# Patient Record
Sex: MALE | Race: WHITE | Employment: OTHER | URBAN - METROPOLITAN AREA
[De-identification: names, ages, dates, MRNs, and addresses within clinical notes are randomized per-mention and may not be internally consistent; named-entity substitution may affect disease eponyms.]

---

## 2018-11-12 ENCOUNTER — HOSPITAL ENCOUNTER (OUTPATIENT)
Dept: MAMMOGRAPHY | Age: 68
Discharge: HOME | End: 2018-11-12
Attending: INTERNAL MEDICINE
Payer: OTHER GOVERNMENT

## 2018-11-12 DIAGNOSIS — N63.0 SWELLING OF BREAST: ICD-10-CM

## 2020-09-01 ENCOUNTER — HOSPITAL ENCOUNTER (EMERGENCY)
Age: 70
Discharge: HOME | End: 2020-09-01
Attending: EMERGENCY MEDICINE
Payer: MEDICARE

## 2020-09-01 ENCOUNTER — APPOINTMENT (OUTPATIENT)
Dept: CT IMAGING | Age: 70
End: 2020-09-01
Attending: EMERGENCY MEDICINE
Payer: MEDICARE

## 2020-09-01 VITALS
RESPIRATION RATE: 16 BRPM | OXYGEN SATURATION: 95 % | SYSTOLIC BLOOD PRESSURE: 126 MMHG | HEIGHT: 67 IN | WEIGHT: 160 LBS | BODY MASS INDEX: 25.11 KG/M2 | DIASTOLIC BLOOD PRESSURE: 81 MMHG | HEART RATE: 69 BPM | TEMPERATURE: 98.6 F

## 2020-09-01 DIAGNOSIS — R10.9 ACUTE ABDOMINAL PAIN: Primary | ICD-10-CM

## 2020-09-01 DIAGNOSIS — K59.00 CONSTIPATION, UNSPECIFIED CONSTIPATION TYPE: ICD-10-CM

## 2020-09-01 LAB
ALBUMIN SERPL-MCNC: 3.6 G/DL (ref 3.2–4.6)
ALBUMIN/GLOB SERPL: 1.2 {RATIO} (ref 1.2–3.5)
ALP SERPL-CCNC: 140 U/L (ref 50–136)
ALT SERPL-CCNC: 22 U/L (ref 12–65)
ANION GAP SERPL CALC-SCNC: 4 MMOL/L (ref 7–16)
AST SERPL-CCNC: 13 U/L (ref 15–37)
BASOPHILS # BLD: 0.1 K/UL (ref 0–0.2)
BASOPHILS NFR BLD: 1 % (ref 0–2)
BILIRUB SERPL-MCNC: 0.2 MG/DL (ref 0.2–1.1)
BUN SERPL-MCNC: 19 MG/DL (ref 8–23)
CALCIUM SERPL-MCNC: 8.8 MG/DL (ref 8.3–10.4)
CHLORIDE SERPL-SCNC: 108 MMOL/L (ref 98–107)
CO2 SERPL-SCNC: 31 MMOL/L (ref 21–32)
CREAT SERPL-MCNC: 1.02 MG/DL (ref 0.8–1.5)
DIFFERENTIAL METHOD BLD: ABNORMAL
EOSINOPHIL # BLD: 0.1 K/UL (ref 0–0.8)
EOSINOPHIL NFR BLD: 2 % (ref 0.5–7.8)
ERYTHROCYTE [DISTWIDTH] IN BLOOD BY AUTOMATED COUNT: 13.5 % (ref 11.9–14.6)
GLOBULIN SER CALC-MCNC: 3.1 G/DL (ref 2.3–3.5)
GLUCOSE SERPL-MCNC: 95 MG/DL (ref 65–100)
HCT VFR BLD AUTO: 40.2 % (ref 41.1–50.3)
HGB BLD-MCNC: 13.4 G/DL (ref 13.6–17.2)
IMM GRANULOCYTES # BLD AUTO: 0 K/UL (ref 0–0.5)
IMM GRANULOCYTES NFR BLD AUTO: 0 % (ref 0–5)
LYMPHOCYTES # BLD: 1.7 K/UL (ref 0.5–4.6)
LYMPHOCYTES NFR BLD: 36 % (ref 13–44)
MCH RBC QN AUTO: 32.4 PG (ref 26.1–32.9)
MCHC RBC AUTO-ENTMCNC: 33.3 G/DL (ref 31.4–35)
MCV RBC AUTO: 97.3 FL (ref 79.6–97.8)
MONOCYTES # BLD: 0.5 K/UL (ref 0.1–1.3)
MONOCYTES NFR BLD: 11 % (ref 4–12)
NEUTS SEG # BLD: 2.4 K/UL (ref 1.7–8.2)
NEUTS SEG NFR BLD: 50 % (ref 43–78)
NRBC # BLD: 0 K/UL (ref 0–0.2)
PLATELET # BLD AUTO: 288 K/UL (ref 150–450)
PMV BLD AUTO: 9.3 FL (ref 9.4–12.3)
POTASSIUM SERPL-SCNC: 4.1 MMOL/L (ref 3.5–5.1)
PROT SERPL-MCNC: 6.7 G/DL (ref 6.3–8.2)
RBC # BLD AUTO: 4.13 M/UL (ref 4.23–5.6)
SODIUM SERPL-SCNC: 143 MMOL/L (ref 136–145)
WBC # BLD AUTO: 4.8 K/UL (ref 4.3–11.1)

## 2020-09-01 PROCEDURE — 85025 COMPLETE CBC W/AUTO DIFF WBC: CPT

## 2020-09-01 PROCEDURE — 74176 CT ABD & PELVIS W/O CONTRAST: CPT

## 2020-09-01 PROCEDURE — 74011250637 HC RX REV CODE- 250/637: Performed by: EMERGENCY MEDICINE

## 2020-09-01 PROCEDURE — 80053 COMPREHEN METABOLIC PANEL: CPT

## 2020-09-01 PROCEDURE — 99283 EMERGENCY DEPT VISIT LOW MDM: CPT

## 2020-09-01 PROCEDURE — 74011250636 HC RX REV CODE- 250/636: Performed by: EMERGENCY MEDICINE

## 2020-09-01 RX ORDER — MAGNESIUM CITRATE
296 SOLUTION, ORAL ORAL
Status: COMPLETED | OUTPATIENT
Start: 2020-09-01 | End: 2020-09-01

## 2020-09-01 RX ADMIN — SODIUM CHLORIDE 1000 ML: 900 INJECTION, SOLUTION INTRAVENOUS at 21:12

## 2020-09-01 RX ADMIN — MAGESIUM CITRATE 296 ML: 1.75 LIQUID ORAL at 22:43

## 2020-09-02 NOTE — DISCHARGE INSTRUCTIONS
Call surgeon for appointment to recheck regarding potentially reoccurring hernia and potentially about your gallstones to see if he feels like they may be causing the pain. Consider use of over-the-counter Colace or MiraLAX. Patient Education        Abdominal Pain: Care Instructions  Your Care Instructions     Abdominal pain has many possible causes. Some aren't serious and get better on their own in a few days. Others need more testing and treatment. If your pain continues or gets worse, you need to be rechecked and may need more tests to find out what is wrong. You may need surgery to correct the problem. Don't ignore new symptoms, such as fever, nausea and vomiting, urination problems, pain that gets worse, and dizziness. These may be signs of a more serious problem. Your doctor may have recommended a follow-up visit in the next 8 to 12 hours. If you are not getting better, you may need more tests or treatment. The doctor has checked you carefully, but problems can develop later. If you notice any problems or new symptoms, get medical treatment right away. Follow-up care is a key part of your treatment and safety. Be sure to make and go to all appointments, and call your doctor if you are having problems. It's also a good idea to know your test results and keep a list of the medicines you take. How can you care for yourself at home? · Rest until you feel better. · To prevent dehydration, drink plenty of fluids, enough so that your urine is light yellow or clear like water. Choose water and other caffeine-free clear liquids until you feel better. If you have kidney, heart, or liver disease and have to limit fluids, talk with your doctor before you increase the amount of fluids you drink. · If your stomach is upset, eat mild foods, such as rice, dry toast or crackers, bananas, and applesauce. Try eating several small meals instead of two or three large ones.   · Wait until 48 hours after all symptoms have gone away before you have spicy foods, alcohol, and drinks that contain caffeine. · Do not eat foods that are high in fat. · Avoid anti-inflammatory medicines such as aspirin, ibuprofen (Advil, Motrin), and naproxen (Aleve). These can cause stomach upset. Talk to your doctor if you take daily aspirin for another health problem. When should you call for help? [id number] anytime you think you may need emergency care. For example, call if:  · You passed out (lost consciousness). · You pass maroon or very bloody stools. · You vomit blood or what looks like coffee grounds. · You have new, severe belly pain. Call your doctor now or seek immediate medical care if:  · Your pain gets worse, especially if it becomes focused in one area of your belly. · You have a new or higher fever. · Your stools are black and look like tar, or they have streaks of blood. · You have unexpected vaginal bleeding. · You have symptoms of a urinary tract infection. These may include:  ? Pain when you urinate. ? Urinating more often than usual.  ? Blood in your urine. · You are dizzy or lightheaded, or you feel like you may faint. Watch closely for changes in your health, and be sure to contact your doctor if:  · You are not getting better after 1 day (24 hours). Where can you learn more? Go to http://joaquim-navjot.info/  Enter H214 in the search box to learn more about \"Abdominal Pain: Care Instructions. \"  Current as of: June 26, 2019               Content Version: 12.5  © 9046-9051 Health[employer]. Care instructions adapted under license by [employer] (which disclaims liability or warranty for this information). If you have questions about a medical condition or this instruction, always ask your healthcare professional. [place] [address] any warranty or liability for your use of this information.          Patient Education        Constipation: Care Instructions  Your Care Instructions     Constipation means that you have a hard time passing stools (bowel movements). People pass stools from 3 times a day to once every 3 days. What is normal for you may be different. Constipation may occur with pain in the rectum and cramping. The pain may get worse when you try to pass stools. Sometimes there are small amounts of bright red blood on toilet paper or the surface of stools. This is because of enlarged veins near the rectum (hemorrhoids). A few changes in your diet and lifestyle may help you avoid ongoing constipation. Your doctor may also prescribe medicine to help loosen your stool. Some medicines can cause constipation. These include pain medicines and antidepressants. Tell your doctor about all the medicines you take. Your doctor may want to make a medicine change to ease your symptoms. Follow-up care is a key part of your treatment and safety. Be sure to make and go to all appointments, and call your doctor if you are having problems. It's also a good idea to know your test results and keep a list of the medicines you take. How can you care for yourself at home? · Drink plenty of fluids, enough so that your urine is light yellow or clear like water. If you have kidney, heart, or liver disease and have to limit fluids, talk with your doctor before you increase the amount of fluids you drink. · Include high-fiber foods in your diet each day. These include fruits, vegetables, beans, and whole grains. · Get at least 30 minutes of exercise on most days of the week. Walking is a good choice. You also may want to do other activities, such as running, swimming, cycling, or playing tennis or team sports. · Take a fiber supplement, such as Citrucel or Metamucil, every day. Read and follow all instructions on the label. · Schedule time each day for a bowel movement. A daily routine may help. Take your time having your bowel movement.   · Support your feet with a small step stool when you sit on the toilet. This helps flex your hips and places your pelvis in a squatting position. · Your doctor may recommend an over-the-counter laxative to relieve your constipation. Examples are Milk of Magnesia and MiraLax. Read and follow all instructions on the label. Do not use laxatives on a long-term basis. When should you call for help? Call your doctor now or seek immediate medical care if:  · You have new or worse belly pain. · You have new or worse nausea or vomiting. · You have blood in your stools. Watch closely for changes in your health, and be sure to contact your doctor if:  · Your constipation is getting worse. · You do not get better as expected. Where can you learn more? Go to http://joaquim-navjot.info/  Enter P343 in the search box to learn more about \"Constipation: Care Instructions. \"  Current as of: June 26, 2019               Content Version: 12.5  © 3652-6049 [employer]. Care instructions adapted under license by [employer] (which disclaims liability or warranty for this information). If you have questions about a medical condition or this instruction, always ask your healthcare professional. [place] [address] any warranty or liability for your use of this information. Patient Education        Learning About Gallstones  What are gallstones? Gallstones are stones that form in the gallbladder. The gallbladder is a small sac located just under the liver. It stores bile released by the liver. Bile helps you digest fats. Gallstones can be smaller than a grain of sand or as large as a golf ball. Gallstones form when cholesterol and other things found in bile make stones. They can also form if the gallbladder doesn't empty as it should. Gallstones can also form in the common bile duct or cystic duct. These tubes carry bile from the gallbladder and the liver to the small intestine.   What happens when you have gallstones? Gallstones can cause many different problems, such as:  · A blockage in the common bile duct. · Inflammation or infection of the gallbladder (acute cholecystitis). This can happen when a gallstone blocks the cystic duct. · Inflammation or infection of the common bile duct (cholangitis). This can happen when gallstones get stuck in the common bile duct. In rare cases, this can damage the liver or spread infection. · Pancreatitis, an inflammation of the pancreas. What are the symptoms? · Most people who have gallstones don't have symptoms. · When symptoms occur, they can include:  ? Pain in the pit of your stomach or in the upper right part of your belly. It may spread to your right upper back or shoulder blade area. ? Pain that may come and go or be steady. It may get worse when you eat. ? Fever and chills, if a gallstone is blocking a bile duct and causing an infection. ? Yellowing of your skin and the whites of your eyes. How can you prevent gallstones? There is no sure way to prevent gallstones. But you can reduce your risk of forming gallstones that can cause symptoms. · Stay at a healthy weight. If you need to lose weight, do so slowly and sensibly. · Eat regular, balanced meals. · Be active, and exercise regularly. How are gallstones treated? · If you don't have symptoms, you probably don't need treatment. · For mild symptoms, your doctor may have you take pain medicine and wait to see if the pain goes away. · For severe pain or infection, or if you have more than one gallstone attack, your doctor may suggest surgery to have your gallbladder removed. The body works fine without a gallbladder. Follow-up care is a key part of your treatment and safety. Be sure to make and go to all appointments, and call your doctor if you are having problems. It's also a good idea to know your test results and keep a list of the medicines you take. Where can you learn more?   Go to http://www.gray.com/  Enter Y514 in the search box to learn more about \"Learning About Gallstones. \"  Current as of: August 12, 2019               Content Version: 12.5  © 8471-6801 Healthwise, Incorporated. Care instructions adapted under license by [employer] (which disclaims liability or warranty for this information). If you have questions about a medical condition or this instruction, always ask your healthcare professional. [place] [address] any warranty or liability for your use of this information.

## 2020-09-02 NOTE — ED PROVIDER NOTES
68-year-old male with 2 to 3-day history of waxing waning lower abdominal pain. Some fullness at times. A hard bowel movement last evening. But no diarrhea or bleeding. No vomiting last few days. States he has occasional vomiting. There is no fever or melena. Patient has a history of umbilical hernias with groin hernias and repairs with mesh. Also history of seizure and hypertension. No issues with his pancreas gallbladder or appendix that he knows of. Review of records reveals hernia surgery with mesh. Also history of exploratory surgery due to chainsaw injury to the abdomen but evidently there was not any intraperitoneal injuries. The history is provided by the patient. Abdominal Pain    This is a new problem. The current episode started more than 2 days ago. The problem occurs daily. The problem has not changed since onset. The pain is located in the RLQ, LLQ, periumbilical region and suprapubic region. The quality of the pain is dull and cramping. The pain is moderate. Associated symptoms include constipation. Pertinent negatives include no fever, no diarrhea, no hematochezia, no melena, no nausea, no vomiting, no dysuria, no frequency, no hematuria, no headaches, no chest pain and no back pain. Nothing worsens the pain. The pain is relieved by nothing. His past medical history does not include pancreatitis or diverticulitis. Past Medical History:   Diagnosis Date    Hypertension     Seizures ([address]        Past Surgical History:   Procedure Laterality Date    ABDOMEN SURGERY PROC UNLISTED      HX ORTHOPAEDIC           No family history on file.     Social History     Socioeconomic History    Marital status:      Spouse name: Not on file    Number of children: Not on file    Years of education: Not on file    Highest education level: Not on file   Occupational History    Not on file   Social Needs    Financial resource strain: Not on file    Food insecurity     Worry: Not on file     Inability: Not on file    Transportation needs     Medical: Not on file     Non-medical: Not on file   Tobacco Use    Smoking status: Not on file    Smokeless tobacco: Never Used   Substance and Sexual Activity    Alcohol use: No    Drug use: No    Sexual activity: Not on file   Lifestyle    Physical activity     Days per week: Not on file     Minutes per session: Not on file    Stress: Not on file   Relationships    Social connections     Talks on phone: Not on file     Gets together: Not on file     Attends Evangelical service: Not on file     Active member of club or organization: Not on file     Attends meetings of clubs or organizations: Not on file     Relationship status: Not on file    Intimate partner violence     Fear of current or ex partner: Not on file     Emotionally abused: Not on file     Physically abused: Not on file     Forced sexual activity: Not on file   Other Topics Concern    Not on file   Social History Narrative    Not on file         ALLERGIES: Aspartame    Review of Systems   Constitutional: Negative for chills and fever. Respiratory: Negative for cough and shortness of breath. Cardiovascular: Negative for chest pain and palpitations. Gastrointestinal: Positive for abdominal pain and constipation. Negative for blood in stool, diarrhea, hematochezia, melena, nausea and vomiting. Genitourinary: Negative for dysuria, flank pain, frequency and hematuria. Musculoskeletal: Negative for back pain and neck pain. Skin: Negative for color change and rash. Neurological: Negative for syncope and headaches. All other systems reviewed and are negative. Vitals:    09/01/20 2016   BP: (!) 178/122   Pulse: 69   Resp: 16   Temp: 98.6 °F (37 °C)   SpO2: 96%   Weight: 72.6 kg (160 lb)   Height: 5' 7\" (1.702 m)            Physical Exam  Vitals signs and nursing note reviewed. Constitutional:       General: He is not in acute distress.      Appearance: He is well-developed. HENT:      Head: Normocephalic and atraumatic. Right Ear: External ear normal.      Left Ear: External ear normal.      Mouth/Throat:      Pharynx: No oropharyngeal exudate. Eyes:      Conjunctiva/sclera: Conjunctivae normal.      Pupils: Pupils are equal, round, and reactive to light. Neck:      Musculoskeletal: Normal range of motion and neck supple. Cardiovascular:      Rate and Rhythm: Normal rate and regular rhythm. Heart sounds: No murmur. Pulmonary:      Effort: No respiratory distress. Breath sounds: Normal breath sounds. Abdominal:      General: Bowel sounds are normal.      Palpations: Abdomen is soft. There is no mass. Tenderness: There is abdominal tenderness in the periumbilical area. There is no guarding or rebound. Negative signs include Rodriguez's sign and McBurney's sign. Hernia: No hernia is present. Comments: Mild pain with palpation. No obvious hernias palpated. Skin:     General: Skin is warm and dry. Neurological:      Mental Status: He is alert and oriented to person, place, and time. Gait: Gait normal.      Comments: Nl speech   Psychiatric:         Speech: Speech normal.          MDM  Number of Diagnoses or Management Options  Diagnosis management comments: Screening blood work. Imaging most likely abdominal series to check for constipation. Probable referral back to surgery regarding potential hernia.        Amount and/or Complexity of Data Reviewed  Clinical lab tests: ordered and reviewed  Tests in the radiology section of CPT®: ordered and reviewed    Risk of Complications, Morbidity, and/or Mortality  Presenting problems: moderate  Diagnostic procedures: minimal  Management options: low    Patient Progress  Patient progress: stable         Procedures      Results Include:    Recent Results (from the past 24 hour(s))   CBC WITH AUTOMATED DIFF    Collection Time: 09/01/20  8:20 PM   Result Value Ref Range    WBC 4.8 4.3 - 11.1 K/uL    RBC 4.13 (L) 4.23 - 5.6 M/uL    HGB 13.4 (L) 13.6 - 17.2 g/dL    HCT 40.2 (L) 41.1 - 50.3 %    MCV 97.3 79.6 - 97.8 FL    MCH 32.4 26.1 - 32.9 PG    MCHC 33.3 31.4 - 35.0 g/dL    RDW 13.5 11.9 - 14.6 %    PLATELET 683 680 - 018 K/uL    MPV 9.3 (L) 9.4 - 12.3 FL    ABSOLUTE NRBC 0.00 0.0 - 0.2 K/uL    DF AUTOMATED      NEUTROPHILS 50 43 - 78 %    LYMPHOCYTES 36 13 - 44 %    MONOCYTES 11 4.0 - 12.0 %    EOSINOPHILS 2 0.5 - 7.8 %    BASOPHILS 1 0.0 - 2.0 %    IMMATURE GRANULOCYTES 0 0.0 - 5.0 %    ABS. NEUTROPHILS 2.4 1.7 - 8.2 K/UL    ABS. LYMPHOCYTES 1.7 0.5 - 4.6 K/UL    ABS. MONOCYTES 0.5 0.1 - 1.3 K/UL    ABS. EOSINOPHILS 0.1 0.0 - 0.8 K/UL    ABS. BASOPHILS 0.1 0.0 - 0.2 K/UL    ABS. IMM. GRANS. 0.0 0.0 - 0.5 K/UL   METABOLIC PANEL, COMPREHENSIVE    Collection Time: 09/01/20  8:20 PM   Result Value Ref Range    Sodium 143 136 - 145 mmol/L    Potassium 4.1 3.5 - 5.1 mmol/L    Chloride 108 (H) 98 - 107 mmol/L    CO2 31 21 - 32 mmol/L    Anion gap 4 (L) 7 - 16 mmol/L    Glucose 95 65 - 100 mg/dL    BUN 19 8 - 23 MG/DL    Creatinine 1.02 0.8 - 1.5 MG/DL    GFR est AA >60 >60 ml/min/1.73m2    GFR est non-AA >60 >60 ml/min/1.73m2    Calcium 8.8 8.3 - 10.4 MG/DL    Bilirubin, total 0.2 0.2 - 1.1 MG/DL    ALT (SGPT) 22 12 - 65 U/L    AST (SGOT) 13 (L) 15 - 37 U/L    Alk. phosphatase 140 (H) 50 - 136 U/L    Protein, total 6.7 6.3 - 8.2 g/dL    Albumin 3.6 3.2 - 4.6 g/dL    Globulin 3.1 2.3 - 3.5 g/dL    A-G Ratio 1.2 1.2 - 3.5       Ct Urogram Wo Cont    Result Date: 9/1/2020  CT abdomen and pelvis without contrast COMPARISON: none. INDICATION: Abdominal pain. . TECHNIQUE: CT imaging through the abdomen and pelvis was performed without intravenous. Radiation dose reduction techniques were used for this study:  Our CT scanners use one or all of the following: Automated exposure control, adjustment of the mA and/or kVp according to patient's size, iterative reconstruction.  FINDINGS: Minimal dependent subsegmental atelectasis at the lung bases. Heart is enlarged. Abdomen findings: Absence of IV contrast limits sensitivity. There is no renal calculus or hydronephrosis. The spleen and the liver are normal in contour. There is suggestion of tiny gallstones. There is no peripancreatic fluid collection. Unenhanced adrenal glands and abdominal aorta are unremarkable. Stomach is normal in contour. Small bowel loops are normal in caliber. No small bowel obstruction. There is no evidence of lymphadenopathy. Pelvic findings: Prostate gland is enlarged, exerting mass effect on the urinary bladder. There are scattered colon diverticula. No diverticulitis. Colon is normal in course and caliber with moderate stool volume. Appendix is within normal limits. There is no free air or free fluid. There are degenerative changes of the spine. ORIF of left femur is partially seen. Sclerotic focus in the left acetabulum, likely a bone island. IMPRESSION: 1. Negative for renal calculus or hydronephrosis. 2. Moderate stool volume in the colon. No evidence of colitis, diverticulitis, appendicitis or bowel obstruction. No hernia. 3. Suggestion of tiny gallstones. 4. Enlarged prostate gland. 5. Absence of IV contrast limits sensitivity. Try patient on laxative. Surgical evaluation to see if any recurrent hernia and possibly for gallstones.

## 2020-09-02 NOTE — ED NOTES
Pt arrives ambulatory to triage complaining of abdominal pain for the \"a couple weeks\" pt believes the pain is caused by a hernia, as he has a hx of hernia which surgically repaired. Pt reports constipation as well as occasionally passing stool with red blood.

## 2020-09-02 NOTE — ED NOTES
I have reviewed discharge instructions with the patient. The patient verbalized understanding. Patient left ED via Discharge Method: ambulatory to Home with self. Opportunity for questions and clarification provided. Patient given 0 scripts. To continue your aftercare when you leave the hospital, you may receive an automated call from our care team to check in on how you are doing. This is a free service and part of our promise to provide the best care and service to meet your aftercare needs.  If you have questions, or wish to unsubscribe from this service please call [phone]. Thank you for Choosing our [address] [place] [address] Emergency Department.

## 2021-10-07 PROBLEM — R10.32 LEFT LOWER QUADRANT ABDOMINAL PAIN: Status: ACTIVE | Noted: 2021-10-07

## 2021-11-16 ENCOUNTER — HOSPITAL ENCOUNTER (EMERGENCY)
Age: 71
Discharge: HOME | End: 2021-11-16
Attending: EMERGENCY MEDICINE
Payer: MEDICARE

## 2021-11-16 ENCOUNTER — APPOINTMENT (OUTPATIENT)
Dept: GENERAL RADIOLOGY | Age: 71
End: 2021-11-16
Attending: EMERGENCY MEDICINE
Payer: MEDICARE

## 2021-11-16 VITALS
OXYGEN SATURATION: 96 % | BODY MASS INDEX: 28.41 KG/M2 | HEIGHT: 67 IN | SYSTOLIC BLOOD PRESSURE: 151 MMHG | HEART RATE: 92 BPM | TEMPERATURE: 98.2 F | RESPIRATION RATE: 16 BRPM | DIASTOLIC BLOOD PRESSURE: 66 MMHG | WEIGHT: 181 LBS

## 2021-11-16 DIAGNOSIS — S81.812A NONINFECTED SKIN TEAR OF LEFT LOWER EXTREMITY, INITIAL ENCOUNTER: Primary | ICD-10-CM

## 2021-11-16 PROCEDURE — 73590 X-RAY EXAM OF LOWER LEG: CPT

## 2021-11-16 PROCEDURE — 99283 EMERGENCY DEPT VISIT LOW MDM: CPT

## 2021-11-16 NOTE — ED PROVIDER NOTES
68-year-old male who presents with chief complaint of laceration to his left lower extremity. This occurred approximately 3 hours prior to arrival while he was using a chainsaw. He is up-to-date with tetanus having last received one 6 years ago had bleeding controlled at home however presented because he was concerned with infection secondary to the motor oil of the chainsaw. No other medical complaints. Past Medical History:   Diagnosis Date    Hypertension     Seizures ([address]        Past Surgical History:   Procedure Laterality Date    HX ORTHOPAEDIC      [place] ABDOMEN SURGERY PROC UNLISTED           No family history on file. Social History     Socioeconomic History    Marital status:      Spouse name: Not on file    Number of children: Not on file    Years of education: Not on file    Highest education level: Not on file   Occupational History    Not on file   Tobacco Use    Smoking status: Never Smoker    Smokeless tobacco: Never Used   Substance and Sexual Activity    Alcohol use: No    Drug use: No    Sexual activity: Not on file   Other Topics Concern    Not on file   Social History Narrative    Not on file     Social Determinants of Health     Financial Resource Strain:     Difficulty of Paying Living Expenses: Not on file   Food Insecurity:     Worried About Running Out of Food in the Last Year: Not on file    [place] of Food in the Last Year: Not on file   Transportation Needs:     Lack of Transportation (Medical): Not on file    Lack of Transportation (Non-Medical):  Not on file   Physical Activity:     Days of Exercise per Week: Not on file    Minutes of Exercise per Session: Not on file   Stress:     Feeling of Stress : Not on file   Social Connections:     Frequency of Communication with Friends and Family: Not on file    Frequency of Social Gatherings with Friends and Family: Not on file    Attends Anglican Services: Not on file   [employer] of Clubs or Organizations: Not on file    Attends Club or Organization Meetings: Not on file    Marital Status: Not on file   Intimate Partner Violence:     Fear of Current or Ex-Partner: Not on file    Emotionally Abused: Not on file    Physically Abused: Not on file    Sexually Abused: Not on file   Housing Stability:     Unable to Pay for Housing in the Last Year: Not on file    Number of [place] in the Last Year: Not on file    Unstable Housing in the Last Year: Not on file         ALLERGIES: Aspartame    Review of Systems   Constitutional: Negative for activity change and fever. HENT: Negative for rhinorrhea. Respiratory: Negative for cough. Cardiovascular: Negative for chest pain. Gastrointestinal: Negative for nausea and vomiting. Genitourinary: Negative for frequency. Skin: Positive for wound. All other systems reviewed and are negative. Vitals:    11/16/21 1604   BP: (!) 151/66   Pulse: 92   Resp: 16   Temp: 98.2 °F (36.8 °C)   SpO2: 96%   Weight: 82.1 kg (181 lb)   Height: 5' 7\" (1.702 m)            Physical Exam  Vitals and nursing note reviewed. Constitutional:       General: He is not in acute distress. Appearance: Normal appearance. He is normal weight. He is not ill-appearing, toxic-appearing or diaphoretic. HENT:      Nose: Nose normal.      Mouth/Throat:      Mouth: Mucous membranes are moist.      Pharynx: No oropharyngeal exudate. Eyes:      Pupils: Pupils are equal, round, and reactive to light. Cardiovascular:      Rate and Rhythm: Normal rate. Pulses: Normal pulses. Heart sounds: No murmur heard. Pulmonary:      Effort: Pulmonary effort is normal.   Abdominal:      General: Abdomen is flat. Palpations: Abdomen is soft. Genitourinary:     Penis: Normal.    Musculoskeletal:         General: Normal range of motion. Comments: Intact dorsi and plantar flexion of the left foot. Patient able to move and articulate the ankle and toes. He has intact sensation distal to the wound. Skin:     Comments: Approximately the 3.5 cm linear laceration with jagged edges to the left lower extremity, medial aspect. Not actively bleeding. This appears more like a skin tear rather than a true laceration. Just through the most superficial layer of dermis. Neurological:      Mental Status: He is alert. MDM  Number of Diagnoses or Management Options  Diagnosis management comments: Wound was cleaned by nursing staff, on physical exam there is no real laceration to repair, this is more of a skin tear. Patient is up-to-date with tetanus. This is a very superficial wound, I see no indication for prophylactic antibiotics at this time. Advise follow-up with his primary care physician.     Risk of Complications, Morbidity, and/or Mortality  Presenting problems: low  Diagnostic procedures: low  Management options: low    Patient Progress  Patient progress: improved         Procedures

## 2021-11-16 NOTE — ED NOTES
I have reviewed discharge instructions with the patient and spouse. The patient and spouse verbalized understanding. Patient left ED via Discharge Method: ambulatory to Home with spouse    Opportunity for questions and clarification provided. Patient given 0 scripts. To continue your aftercare when you leave the hospital, you may receive an automated call from our care team to check in on how you are doing. This is a free service and part of our promise to provide the best care and service to meet your aftercare needs.  If you have questions, or wish to unsubscribe from this service please call [phone]. Thank you for Choosing our [facility] Emergency Department.

## 2021-11-16 NOTE — DISCHARGE INSTRUCTIONS
Keep wound dry for the next 48 hours, afterwards you can change bandage daily. Follow-up with your primary care physician.

## 2022-01-24 PROCEDURE — 99284 EMERGENCY DEPT VISIT MOD MDM: CPT

## 2022-01-25 ENCOUNTER — HOSPITAL ENCOUNTER (OUTPATIENT)
Dept: CT IMAGING | Age: 72
Discharge: HOME | End: 2022-01-25
Attending: EMERGENCY MEDICINE

## 2022-01-25 ENCOUNTER — HOSPITAL ENCOUNTER (EMERGENCY)
Age: 72
Discharge: HOME | End: 2022-01-25
Attending: EMERGENCY MEDICINE
Payer: OTHER GOVERNMENT

## 2022-01-25 VITALS
HEART RATE: 89 BPM | WEIGHT: 179 LBS | OXYGEN SATURATION: 91 % | DIASTOLIC BLOOD PRESSURE: 76 MMHG | SYSTOLIC BLOOD PRESSURE: 142 MMHG | BODY MASS INDEX: 28.04 KG/M2 | TEMPERATURE: 98.2 F | RESPIRATION RATE: 18 BRPM

## 2022-01-25 DIAGNOSIS — S12.112A CLOSED NONDISPLACED ODONTOID FRACTURE WITH TYPE II MORPHOLOGY, INITIAL ENCOUNTER (HCC): Primary | ICD-10-CM

## 2022-01-25 PROCEDURE — 74011250637 HC RX REV CODE- 250/637: Performed by: EMERGENCY MEDICINE

## 2022-01-25 RX ORDER — PROMETHAZINE HYDROCHLORIDE 25 MG/1
25 TABLET ORAL
Qty: 15 TABLET | Refills: 0 | Status: SHIPPED | OUTPATIENT
Start: 2022-01-25

## 2022-01-25 RX ORDER — HYDROCODONE BITARTRATE AND ACETAMINOPHEN 7.5; 325 MG/1; MG/1
1 TABLET ORAL
Status: COMPLETED | OUTPATIENT
Start: 2022-01-25 | End: 2022-01-25

## 2022-01-25 RX ORDER — PROMETHAZINE HYDROCHLORIDE 25 MG/1
25 TABLET ORAL
Status: COMPLETED | OUTPATIENT
Start: 2022-01-25 | End: 2022-01-25

## 2022-01-25 RX ORDER — OXYCODONE HYDROCHLORIDE 5 MG/1
5 TABLET ORAL
Qty: 20 TABLET | Refills: 0 | Status: SHIPPED | OUTPATIENT
Start: 2022-01-25 | End: 2022-01-30

## 2022-01-25 RX ADMIN — HYDROCODONE BITARTRATE AND ACETAMINOPHEN 1 TABLET: 7.5; 325 TABLET ORAL at 06:14

## 2022-01-25 RX ADMIN — PROMETHAZINE HYDROCHLORIDE 25 MG: 25 TABLET ORAL at 06:14

## 2022-01-25 NOTE — ED NOTES
I have reviewed discharge instructions with the patient and spouse. The patient and spouse verbalized understanding. Patient left ED via Discharge Method: ambulatory to Home with transport from wife. Opportunity for questions and clarification provided. Patient given 2 scripts. To continue your aftercare when you leave the hospital, you may receive an automated call from our care team to check in on how you are doing. This is a free service and part of our promise to provide the best care and service to meet your aftercare needs.  If you have questions, or wish to unsubscribe from this service please call [phone]. Thank you for Choosing our [facility] Emergency Department.
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
## 2022-01-25 NOTE — DISCHARGE INSTRUCTIONS
Follow-up with Dr. Quang Sweet of [facility] this week, call the office to make an appointment. Keep your cervical spine collar on at all times, only removing it to change clothes. You should sleep in the collar and wear it during the day. Return to the emergency department if your symptoms worsen.
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
## 2022-03-10 ENCOUNTER — HOSPITAL ENCOUNTER (OUTPATIENT)
Dept: CT IMAGING | Age: 72
Discharge: HOME | End: 2022-03-10
Attending: ORTHOPAEDIC SURGERY
Payer: MEDICARE

## 2022-03-10 DIAGNOSIS — S12.100D CLOSED ODONTOID FRACTURE WITH ROUTINE HEALING, SUBSEQUENT ENCOUNTER: ICD-10-CM

## 2022-03-10 PROCEDURE — 72125 CT NECK SPINE W/O DYE: CPT

## 2022-03-19 PROBLEM — R10.32 LEFT LOWER QUADRANT ABDOMINAL PAIN: Status: ACTIVE | Noted: 2021-10-07

## 2022-04-19 ENCOUNTER — HOSPITAL ENCOUNTER (OUTPATIENT)
Dept: CT IMAGING | Age: 72
Discharge: HOME | End: 2022-04-19
Attending: ORTHOPAEDIC SURGERY
Payer: MEDICARE

## 2022-04-19 DIAGNOSIS — S12.100D CLOSED ODONTOID FRACTURE WITH ROUTINE HEALING, SUBSEQUENT ENCOUNTER: ICD-10-CM

## 2022-04-19 PROCEDURE — 72125 CT NECK SPINE W/O DYE: CPT

## 2022-08-25 NOTE — PROGRESS NOTES
[facility] CARDIOLOGY History & Physical                 Reason for Visit: Severe aortic stenosis    Subjective:     Patient is a 66 y.o. male with a PMH of aortic stenosis, hypertension and anemia who presents as a post ED follow-up. The patient went to the ED at [facility] on August 15. He presented with SOB and CP, according to documentation. Troponin was noted to be normal.  He did test positive for COVID-19. A CXR was notable for \"small bilateral pleural effusions\". He had an echocardiogram on July 31 that was noted to demonstrate a low normal EF with \"possible apical hypokinesis\". Mild AI and \"severe aortic valve stenosis\" was noted. His AV V-max was noted to be 4.25 m/s and AV mean gradient 50 mmHg. Of note, the patient had a hemoglobin of 7.2 three weeks ago. It was 11.2 on August 15. He was admitted at the end of July to [facility] after sustaining a mechanical fall. A CT chest/abdomen/pelvis noted a large left posterior neck/superior scapular hematoma with \"multiple foci of active bleeding\". However, a CTA of the neck noted a 7.2 cm hematoma with no active bleeding. He was also noted to have a scapular hematoma. Cardiology evaluated the patient who noted that the patient to follow-up within 1 month for consideration of TAVR versus SAVR. The patient reports CONTRERAS and near syncope. He denies GI bleeding. Past Medical History:   Diagnosis Date    Hypertension     Seizures ([address]       Past Surgical History:   Procedure Laterality Date    ORTHOPEDIC SURGERY      [place] ABDOMEN SURGERY PROC UNLISTED        No family history on file. Social History     Tobacco Use    Smoking status: Never    Smokeless tobacco: Never   Substance Use Topics    Alcohol use: No      Allergies   Allergen Reactions    Aspartame Other (See Comments)     seizures         ROS:  No obvious pertinent positives on review of systems except for what was outlined above.        Objective:       /80   Pulse 68   Ht 5' 7\" (1.702 m)   Wt 180 lb (81.6 kg)   BMI 28.19 kg/m²     BP Readings from Last 3 Encounters:   08/26/22 138/80   10/07/21 130/70       Wt Readings from Last 3 Encounters:   08/26/22 180 lb (81.6 kg)   10/07/21 188 lb 9.6 oz (85.5 kg)       General/Constitutional:   Alert and oriented x 3, no acute distress  HEENT:   normocephalic, atraumatic, moist mucous membranes  Neck:   No JVD or carotid bruits bilaterally  Cardiovascular:   regular rate and rhythm, no rub/gallop appreciated; 4 out of 6 systolic murmur heard loudest over the RUSB  Pulmonary:   clear to auscultation bilaterally, no respiratory distress  Abdomen:   soft, non-tender, non-distended  Ext:   No sig LE edema bilaterally; varicose veins bilaterally  Skin:  warm and dry, no obvious rashes seen  Neuro:   no obvious sensory or motor deficits  Psychiatric:   normal mood and affect    ECG:   Sinus rhythm  Normal ECG  Heart rate 60 bpm    Data Review:   No results found for: CHOL  No results found for: TRIG  No results found for: HDL  No results found for: LDLCHOLESTEROL, LDLCALC  No results found for: LABVLDL, VLDL  No results found for: [facility]     Lab Results   Component Value Date/Time     09/01/2020 08:20 PM    K 4.1 09/01/2020 08:20 PM     09/01/2020 08:20 PM    CO2 31 09/01/2020 08:20 PM    BUN 19 09/01/2020 08:20 PM    CREATININE 1.02 09/01/2020 08:20 PM    GLUCOSE 95 09/01/2020 08:20 PM    CALCIUM 8.8 09/01/2020 08:20 PM         Lab Results   Component Value Date    ALT 22 09/01/2020    AST 13 (L) 09/01/2020        Assessment/Plan:   1. Near syncope  - Concern that this is secondary to severe AS  - Obtain an echocardiogram on the day of [facility]    2.  CONTRERAS (dyspnea on exertion)  - TTE on July 31 noted a low normal EF with \"possible apical hypokinesis\" and severe aortic stenosis (AV V-max 4.25 m/s and AV mean gradient 50 mmHg)  - Concern that CONTRERAS is an anginal equivalent and/or is related to severe aortic stenosis  - Obtain an echocardiogram on the day of [facility]  - In the setting of severe AS, it is reasonable to pursue an angiogram  - Obtain precath labs and an angiogram pending precath labs      3. Severe aortic stenosis by prior echocardiogram  - Obtain an echocardiogram on the day of LHC  - Obtain an LHC  - Anticipate TAVR or SAVR in the near future    4.  Hypertension, unspecified type  - Well-controlled  - Continue with amlodipine  - Currently on losartan    F/U: [facility]    Deepa Mobley MD

## 2022-08-26 ENCOUNTER — OFFICE VISIT (OUTPATIENT)
Dept: CARDIOLOGY CLINIC | Age: 72
End: 2022-08-26
Payer: MEDICARE

## 2022-08-26 ENCOUNTER — PATIENT MESSAGE (OUTPATIENT)
Dept: CARDIOLOGY CLINIC | Age: 72
End: 2022-08-26

## 2022-08-26 VITALS
HEART RATE: 68 BPM | BODY MASS INDEX: 28.25 KG/M2 | DIASTOLIC BLOOD PRESSURE: 80 MMHG | SYSTOLIC BLOOD PRESSURE: 138 MMHG | WEIGHT: 180 LBS | HEIGHT: 67 IN

## 2022-08-26 DIAGNOSIS — I35.0 SEVERE AORTIC STENOSIS BY PRIOR ECHOCARDIOGRAM: ICD-10-CM

## 2022-08-26 DIAGNOSIS — R06.09 DOE (DYSPNEA ON EXERTION): ICD-10-CM

## 2022-08-26 DIAGNOSIS — R55 NEAR SYNCOPE: Primary | ICD-10-CM

## 2022-08-26 DIAGNOSIS — I10 HYPERTENSION, UNSPECIFIED TYPE: ICD-10-CM

## 2022-08-26 PROBLEM — R06.02 SOB (SHORTNESS OF BREATH): Status: ACTIVE | Noted: 2022-08-26

## 2022-08-26 PROBLEM — I20.0 ACCELERATING ANGINA (HCC): Status: ACTIVE | Noted: 2022-08-26

## 2022-08-26 LAB
ANION GAP SERPL CALC-SCNC: 1 MMOL/L (ref 7–16)
BUN SERPL-MCNC: 14 MG/DL (ref 8–23)
CALCIUM SERPL-MCNC: 8.7 MG/DL (ref 8.3–10.4)
CHLORIDE SERPL-SCNC: 106 MMOL/L (ref 98–107)
CO2 SERPL-SCNC: 28 MMOL/L (ref 21–32)
CREAT SERPL-MCNC: 0.9 MG/DL (ref 0.8–1.5)
ERYTHROCYTE [DISTWIDTH] IN BLOOD BY AUTOMATED COUNT: 14.6 % (ref 11.9–14.6)
GLUCOSE SERPL-MCNC: 88 MG/DL (ref 65–100)
HCT VFR BLD AUTO: 40 % (ref 41.1–50.3)
HGB BLD-MCNC: 12.2 G/DL (ref 13.6–17.2)
MCH RBC QN AUTO: 33.4 PG (ref 26.1–32.9)
MCHC RBC AUTO-ENTMCNC: 30.5 G/DL (ref 31.4–35)
MCV RBC AUTO: 109.6 FL (ref 79.6–97.8)
NRBC # BLD: 0 K/UL (ref 0–0.2)
PLATELET # BLD AUTO: 319 K/UL (ref 150–450)
PMV BLD AUTO: 9.4 FL (ref 9.4–12.3)
POTASSIUM SERPL-SCNC: 4.4 MMOL/L (ref 3.5–5.1)
RBC # BLD AUTO: 3.65 M/UL (ref 4.23–5.6)
SODIUM SERPL-SCNC: 135 MMOL/L (ref 136–145)
WBC # BLD AUTO: 3.9 K/UL (ref 4.3–11.1)

## 2022-08-26 PROCEDURE — 1123F ACP DISCUSS/DSCN MKR DOCD: CPT | Performed by: INTERNAL MEDICINE

## 2022-08-26 PROCEDURE — 93000 ELECTROCARDIOGRAM COMPLETE: CPT | Performed by: INTERNAL MEDICINE

## 2022-08-26 PROCEDURE — 99204 OFFICE O/P NEW MOD 45 MIN: CPT | Performed by: INTERNAL MEDICINE

## 2022-08-26 RX ORDER — DONEPEZIL HYDROCHLORIDE 10 MG/1
5 TABLET, FILM COATED ORAL NIGHTLY
COMMUNITY

## 2022-08-26 RX ORDER — AMLODIPINE BESYLATE 10 MG/1
10 TABLET ORAL DAILY
COMMUNITY

## 2022-08-26 RX ORDER — LOSARTAN POTASSIUM 50 MG/1
50 TABLET ORAL DAILY
COMMUNITY

## 2022-08-26 RX ORDER — VENLAFAXINE HYDROCHLORIDE 150 MG/1
150 CAPSULE, EXTENDED RELEASE ORAL DAILY
COMMUNITY

## 2022-08-26 RX ORDER — OXCARBAZEPINE 300 MG/1
300 TABLET, FILM COATED ORAL 2 TIMES DAILY
COMMUNITY

## 2022-08-26 RX ORDER — FINASTERIDE 5 MG/1
5 TABLET, FILM COATED ORAL DAILY
COMMUNITY

## 2022-08-26 RX ORDER — TAMSULOSIN HYDROCHLORIDE 0.4 MG/1
0.4 CAPSULE ORAL DAILY
COMMUNITY

## 2022-08-29 ENCOUNTER — TELEPHONE (OUTPATIENT)
Dept: CARDIOLOGY CLINIC | Age: 72
End: 2022-08-29

## 2022-08-29 NOTE — TELEPHONE ENCOUNTER
Triage informed pt's wife of Dr. Kayla Monterroso response to pt's labs. She verbalizes understanding.

## 2022-08-29 NOTE — TELEPHONE ENCOUNTER
Did you say you wanted to do an angiogram before the catherization? . My echocardiogram and catherization is scheduled for September 2 at 7:30

## 2022-08-29 NOTE — TELEPHONE ENCOUNTER
Viji Carson MD  You 34 minutes ago (10:37 AM)     ZD  The patient will get a left heart catherization (i.e. coronary angiogram) with Dr. Sveta Friend.         Triage informed pt's wife, Anuel Manzano, of Dr. Tanya Nolan response. She verbalizes understanding.

## 2022-08-29 NOTE — TELEPHONE ENCOUNTER
----- Message from Young Kerns MD sent at 8/27/2022  7:59 AM EDT -----  Please let the patient know his lab work is acceptable for an angiogram.  He does have a mild macrocytic anemia and leukopenia; therefore, I forwarded his lab work to his PCP in case further work up is necessary. His anemia was worse recently in the setting of a hematoma sustained after a fall. His hemoglobin continues to improve since then.

## 2022-09-02 ENCOUNTER — HOSPITAL ENCOUNTER (OUTPATIENT)
Age: 72
Setting detail: OUTPATIENT SURGERY
Discharge: HOME | End: 2022-09-02
Attending: INTERNAL MEDICINE | Admitting: INTERNAL MEDICINE
Payer: OTHER GOVERNMENT

## 2022-09-02 ENCOUNTER — APPOINTMENT (OUTPATIENT)
Dept: NON INVASIVE DIAGNOSTICS | Age: 72
End: 2022-09-02
Attending: INTERNAL MEDICINE
Payer: OTHER GOVERNMENT

## 2022-09-02 VITALS
DIASTOLIC BLOOD PRESSURE: 78 MMHG | OXYGEN SATURATION: 96 % | BODY MASS INDEX: 28.25 KG/M2 | SYSTOLIC BLOOD PRESSURE: 136 MMHG | RESPIRATION RATE: 16 BRPM | WEIGHT: 180 LBS | HEART RATE: 72 BPM | HEIGHT: 67 IN

## 2022-09-02 DIAGNOSIS — R06.02 SOB (SHORTNESS OF BREATH): ICD-10-CM

## 2022-09-02 DIAGNOSIS — I20.0 ACCELERATING ANGINA (HCC): ICD-10-CM

## 2022-09-02 DIAGNOSIS — I35.0 AORTIC VALVE STENOSIS, ETIOLOGY OF CARDIAC VALVE DISEASE UNSPECIFIED: Primary | ICD-10-CM

## 2022-09-02 DIAGNOSIS — R10.32 LEFT LOWER QUADRANT ABDOMINAL PAIN: ICD-10-CM

## 2022-09-02 DIAGNOSIS — R06.09 DOE (DYSPNEA ON EXERTION): ICD-10-CM

## 2022-09-02 DIAGNOSIS — I10 HYPERTENSION: Primary | ICD-10-CM

## 2022-09-02 DIAGNOSIS — S31.109A: ICD-10-CM

## 2022-09-02 DIAGNOSIS — I35.0 SEVERE AORTIC STENOSIS BY PRIOR ECHOCARDIOGRAM: ICD-10-CM

## 2022-09-02 LAB
ECHO AO ASC DIAM: 3.5 CM
ECHO AO ASCENDING AORTA INDEX: 1.81 CM/M2
ECHO AO ROOT DIAM: 3 CM
ECHO AO ROOT INDEX: 1.55 CM/M2
ECHO AR MAX VEL PISA: 4.1 M/S
ECHO AV AREA PEAK VELOCITY: 0.7 CM2
ECHO AV AREA VTI: 0.7 CM2
ECHO AV AREA/BSA PEAK VELOCITY: 0.4 CM2/M2
ECHO AV AREA/BSA VTI: 0.4 CM2/M2
ECHO AV MEAN GRADIENT: 63 MMHG
ECHO AV MEAN VELOCITY: 3.7 M/S
ECHO AV PEAK GRADIENT: 83 MMHG
ECHO AV PEAK VELOCITY: 4.6 M/S
ECHO AV REGURGITANT PHT: 337 MS
ECHO AV VELOCITY RATIO: 0.15
ECHO AV VTI: 122 CM
ECHO BSA: 1.96 M2
ECHO BSA: 1.96 M2
ECHO EST RA PRESSURE: 8 MMHG
ECHO IVC PROX: 2.6 CM
ECHO LA AREA 2C: 24.8 CM2
ECHO LA AREA 4C: 24.4 CM2
ECHO LA DIAMETER INDEX: 2.8 CM/M2
ECHO LA DIAMETER: 5.4 CM
ECHO LA MAJOR AXIS: 6 CM
ECHO LA MINOR AXIS: 5.5 CM
ECHO LA TO AORTIC ROOT RATIO: 1.8
ECHO LA VOL BP: 88 ML (ref 18–58)
ECHO LA VOL/BSA BIPLANE: 46 ML/M2 (ref 16–34)
ECHO LV E' LATERAL VELOCITY: 8 CM/S
ECHO LV E' SEPTAL VELOCITY: 4 CM/S
ECHO LV EDV A2C: 190 ML
ECHO LV EDV A4C: 170 ML
ECHO LV EDV INDEX A4C: 88 ML/M2
ECHO LV EDV NDEX A2C: 98 ML/M2
ECHO LV EJECTION FRACTION A2C: 37 %
ECHO LV EJECTION FRACTION A4C: 36 %
ECHO LV EJECTION FRACTION BIPLANE: 40 % (ref 55–100)
ECHO LV ESV A2C: 119 ML
ECHO LV ESV A4C: 109 ML
ECHO LV ESV INDEX A2C: 62 ML/M2
ECHO LV ESV INDEX A4C: 56 ML/M2
ECHO LV FRACTIONAL SHORTENING: 17 % (ref 28–44)
ECHO LV INTERNAL DIMENSION DIASTOLE INDEX: 3.06 CM/M2
ECHO LV INTERNAL DIMENSION DIASTOLIC: 5.9 CM (ref 4.2–5.9)
ECHO LV INTERNAL DIMENSION SYSTOLIC INDEX: 2.54 CM/M2
ECHO LV INTERNAL DIMENSION SYSTOLIC: 4.9 CM
ECHO LV IVSD: 1.1 CM (ref 0.6–1)
ECHO LV MASS 2D: 271.9 G (ref 88–224)
ECHO LV MASS INDEX 2D: 140.9 G/M2 (ref 49–115)
ECHO LV POSTERIOR WALL DIASTOLIC: 1.1 CM (ref 0.6–1)
ECHO LV RELATIVE WALL THICKNESS RATIO: 0.37
ECHO LVOT AREA: 4.2 CM2
ECHO LVOT AV VTI INDEX: 0.16
ECHO LVOT DIAM: 2.3 CM
ECHO LVOT MEAN GRADIENT: 1 MMHG
ECHO LVOT PEAK GRADIENT: 2 MMHG
ECHO LVOT PEAK VELOCITY: 0.7 M/S
ECHO LVOT STROKE VOLUME INDEX: 40.9 ML/M2
ECHO LVOT SV: 78.9 ML
ECHO LVOT VTI: 19 CM
ECHO MV A VELOCITY: 0.93 M/S
ECHO MV E DECELERATION TIME (DT): 150 MS
ECHO MV E VELOCITY: 1.18 M/S
ECHO MV E/A RATIO: 1.27
ECHO MV E/E' LATERAL: 14.75
ECHO MV E/E' RATIO (AVERAGED): 22.13
ECHO MV E/E' SEPTAL: 29.5
ECHO PV ACCELERATION TIME (AT): 132 MS
ECHO PV MAX VELOCITY: 2 M/S
ECHO PV PEAK GRADIENT: 16 MMHG
ECHO RIGHT VENTRICULAR SYSTOLIC PRESSURE (RVSP): 52 MMHG
ECHO RV BASAL DIMENSION: 4.2 CM
ECHO RV INTERNAL DIMENSION: 3.4 CM
ECHO RV LONGITUDINAL DIMENSION: 6.1 CM
ECHO RV MID DIMENSION: 2.8 CM
ECHO RV TAPSE: 2.2 CM (ref 1.7–?)
ECHO RVOT AREA: 5.7 CM2
ECHO RVOT DIAMETER: 2.7 CM
ECHO TV REGURGITANT MAX VELOCITY: 3.3 M/S
ECHO TV REGURGITANT PEAK GRADIENT: 44 MMHG
EKG ATRIAL RATE: 75 BPM
EKG DIAGNOSIS: NORMAL
EKG P AXIS: 52 DEGREES
EKG P-R INTERVAL: 162 MS
EKG Q-T INTERVAL: 386 MS
EKG QRS DURATION: 120 MS
EKG QTC CALCULATION (BAZETT): 431 MS
EKG R AXIS: -36 DEGREES
EKG T AXIS: 95 DEGREES
EKG VENTRICULAR RATE: 75 BPM

## 2022-09-02 PROCEDURE — C8929 TTE W OR WO FOL WCON,DOPPLER: HCPCS

## 2022-09-02 PROCEDURE — 2500000003 HC RX 250 WO HCPCS: Performed by: INTERNAL MEDICINE

## 2022-09-02 PROCEDURE — 93306 TTE W/DOPPLER COMPLETE: CPT | Performed by: INTERNAL MEDICINE

## 2022-09-02 PROCEDURE — 2709999900 HC NON-CHARGEABLE SUPPLY: Performed by: INTERNAL MEDICINE

## 2022-09-02 PROCEDURE — C1887 CATHETER, GUIDING: HCPCS | Performed by: INTERNAL MEDICINE

## 2022-09-02 PROCEDURE — 6360000002 HC RX W HCPCS: Performed by: INTERNAL MEDICINE

## 2022-09-02 PROCEDURE — 93454 CORONARY ARTERY ANGIO S&I: CPT | Performed by: INTERNAL MEDICINE

## 2022-09-02 PROCEDURE — 2580000003 HC RX 258: Performed by: INTERNAL MEDICINE

## 2022-09-02 PROCEDURE — C1894 INTRO/SHEATH, NON-LASER: HCPCS | Performed by: INTERNAL MEDICINE

## 2022-09-02 PROCEDURE — 6360000004 HC RX CONTRAST MEDICATION: Performed by: INTERNAL MEDICINE

## 2022-09-02 PROCEDURE — 93454 CORONARY ARTERY ANGIO S&I: CPT

## 2022-09-02 PROCEDURE — 93005 ELECTROCARDIOGRAM TRACING: CPT | Performed by: INTERNAL MEDICINE

## 2022-09-02 PROCEDURE — 75630 X-RAY AORTA LEG ARTERIES: CPT | Performed by: INTERNAL MEDICINE

## 2022-09-02 PROCEDURE — C1769 GUIDE WIRE: HCPCS | Performed by: INTERNAL MEDICINE

## 2022-09-02 PROCEDURE — 75630 X-RAY AORTA LEG ARTERIES: CPT

## 2022-09-02 RX ORDER — LIDOCAINE HYDROCHLORIDE 10 MG/ML
INJECTION, SOLUTION INFILTRATION; PERINEURAL PRN
Status: DISCONTINUED | OUTPATIENT
Start: 2022-09-02 | End: 2022-09-02 | Stop reason: HOSPADM

## 2022-09-02 RX ORDER — ASPIRIN 81 MG/1
324 TABLET, CHEWABLE ORAL ONCE
Status: DISCONTINUED | OUTPATIENT
Start: 2022-09-02 | End: 2022-09-02 | Stop reason: HOSPADM

## 2022-09-02 RX ORDER — HEPARIN SODIUM 200 [USP'U]/100ML
INJECTION, SOLUTION INTRAVENOUS CONTINUOUS PRN
Status: DISCONTINUED | OUTPATIENT
Start: 2022-09-02 | End: 2022-09-02 | Stop reason: HOSPADM

## 2022-09-02 RX ORDER — SODIUM CHLORIDE 9 MG/ML
INJECTION, SOLUTION INTRAVENOUS CONTINUOUS
Status: DISCONTINUED | OUTPATIENT
Start: 2022-09-02 | End: 2022-09-02 | Stop reason: HOSPADM

## 2022-09-02 RX ORDER — MIDAZOLAM HYDROCHLORIDE 1 MG/ML
INJECTION INTRAMUSCULAR; INTRAVENOUS PRN
Status: DISCONTINUED | OUTPATIENT
Start: 2022-09-02 | End: 2022-09-02 | Stop reason: HOSPADM

## 2022-09-02 RX ORDER — SODIUM CHLORIDE 0.9 % (FLUSH) 0.9 %
5-40 SYRINGE (ML) INJECTION PRN
Status: DISCONTINUED | OUTPATIENT
Start: 2022-09-02 | End: 2022-09-02 | Stop reason: HOSPADM

## 2022-09-02 RX ORDER — SODIUM CHLORIDE 0.9 % (FLUSH) 0.9 %
5-40 SYRINGE (ML) INJECTION EVERY 12 HOURS SCHEDULED
Status: DISCONTINUED | OUTPATIENT
Start: 2022-09-02 | End: 2022-09-02 | Stop reason: HOSPADM

## 2022-09-02 RX ORDER — ACETAMINOPHEN 325 MG/1
650 TABLET ORAL EVERY 4 HOURS PRN
Status: DISCONTINUED | OUTPATIENT
Start: 2022-09-02 | End: 2022-09-02 | Stop reason: HOSPADM

## 2022-09-02 RX ADMIN — PERFLUTREN 0.45 ML: 6.52 INJECTION, SUSPENSION INTRAVENOUS at 09:14

## 2022-09-02 ASSESSMENT — ENCOUNTER SYMPTOMS
COUGH: 0
BLURRED VISION: 0
BACK PAIN: 0
NAUSEA: 0
SHORTNESS OF BREATH: 1
HEMOPTYSIS: 0
ORTHOPNEA: 0
HEMATEMESIS: 0
CONSTIPATION: 0
RIGHT EYE: 0
HEARTBURN: 0
LEFT EYE: 0
COLOR CHANGE: 0
VOMITING: 0
DIARRHEA: 0
SPUTUM PRODUCTION: 0
ABDOMINAL PAIN: 0
WHEEZING: 0
SORE THROAT: 0

## 2022-09-02 ASSESSMENT — PAIN - FUNCTIONAL ASSESSMENT
PAIN_FUNCTIONAL_ASSESSMENT: NONE - DENIES PAIN

## 2022-09-02 NOTE — CONSULTS
Brandon Joshi. MD Kelly Moreno. [address]         8/26/2022 1950    REFERRING PHYSICIAN:  Dr. Andrea Tran:  The patient is a 66 y.o. male who is seen for evaluation of aortic stenosis. He has noted worsening CONTRERAS, fatigue, occasional chest pain, and dizziness. He states he felt dizzy this morning. Echocardiogram showed critical AS with RADHIKA of 0.7cm2, mean gradient was 63mmHg and peak gradient was 83mmHg. LV EF was normal. He also has moderate MR and moderate TR. He underwent cardiac catheterization today that showed normal coronaries. Past Medical History:   Diagnosis Date    Hypertension     Seizures ([facility]        Past Surgical History:   Procedure Laterality Date    ORTHOPEDIC SURGERY      [place] ABDOMEN SURGERY PROC UNLISTED         No family history on file. Social History     Socioeconomic History    Marital status:      Spouse name: Not on file    Number of children: Not on file    Years of education: Not on file    Highest education level: Not on file   Occupational History    Not on file   Tobacco Use    Smoking status: Never    Smokeless tobacco: Never   Substance and Sexual Activity    Alcohol use: No    Drug use: No    Sexual activity: Not on file   Other Topics Concern    Not on file   Social History Narrative    Not on file     Social Determinants of Health     Financial Resource Strain: Not on file   Food Insecurity: Not on file   Transportation Needs: Not on file   Physical Activity: Not on file   Stress: Not on file   Social Connections: Not on file   Intimate Partner Violence: Not on file   Housing Stability: Not on file       Allergies   Allergen Reactions    Aspartame Other (See Comments)     seizures       No current facility-administered medications on file prior to encounter.      Current Outpatient Medications on File Prior to Encounter   Medication Sig Dispense Refill    venlafaxine [facility] XR) 150 MG extended release capsule Take 150 mg by mouth daily      finasteride (PROSCAR) 5 MG tablet Take 5 mg by mouth daily      donepezil (ARICEPT) 10 MG tablet Take 5 mg by mouth nightly      OXcarbazepine (TRILEPTAL) 300 MG tablet Take 300 mg by mouth 2 times daily      amLODIPine (NORVASC) 10 MG tablet Take 10 mg by mouth daily      losartan (COZAAR) 50 MG tablet Take 50 mg by mouth daily      tamsulosin (FLOMAX) 0.4 MG capsule Take 0.4 mg by mouth daily      Zinc Acetate, Oral, (ZINC ACETATE PO) Take by mouth daily      Multiple Vitamin (MULTI-DAY PO) Take by mouth daily      FOLIC ACID PO Take by mouth daily      Ascorbic Acid (VITAMIN C PO) Take by mouth daily         REVIEW OF SYSTEMS:  Review of Systems   Constitutional: Negative for chills, fever, malaise/fatigue, weight gain and weight loss. HENT:  Negative for ear pain, hearing loss, nosebleeds, sore throat and tinnitus. Eyes:  Negative for blurred vision, vision loss in left eye and vision loss in right eye. Cardiovascular:  Positive for chest pain and dyspnea on exertion. Negative for leg swelling, near-syncope, orthopnea, palpitations, paroxysmal nocturnal dyspnea and syncope. Respiratory:  Positive for shortness of breath. Negative for cough, hemoptysis, sputum production and wheezing. Endocrine: Negative for cold intolerance, heat intolerance and polydipsia. Hematologic/Lymphatic: Negative for bleeding problem. Does not bruise/bleed easily. Skin:  Negative for color change and rash. Musculoskeletal:  Negative for back pain, joint pain, joint swelling and myalgias. Gastrointestinal:  Negative for abdominal pain, constipation, diarrhea, dysphagia, heartburn, hematemesis, melena, nausea and vomiting. Genitourinary:  Negative for dysuria, frequency, hematuria and urgency. Neurological:  Positive for dizziness and light-headedness.  Negative for difficulty with concentration, headaches, numbness, paresthesias, seizures, vertigo and weakness. Psychiatric/Behavioral:  Negative for altered mental status and depression. Physical Exam  Vitals:    09/02/22 0951 09/02/22 1000 09/02/22 1001 09/02/22 1016   BP: 133/76 138/76 133/74 135/71   Pulse: 70 71     Resp: 16 16     SpO2: 91% 91% 95% 97%   Weight:       Height:           Physical Exam:  General: Well Developed, Well Nourished, No Acute Distress  HEENT: Normocephalic, pupils equal and round, no scleral icterus  Neck: supple, no JVD,  Chest wall: No deformity  Heart: S1S2 with RRR with grade IV/VI MYRON Lungs: Clear throughout auscultation bilaterally without adventitious sounds  Abd: soft, nontender, nondistended, with good bowel sounds, no pulsatile masses  Ext: warm, no edema, calves supple/nontender, pulses 2+ bilaterally  Skin: warm and dry, no rashes, cyanosis, jaundice, ecchymoses or evidence of skin breakdown  Psychiatric: Normal mood and affect  Neurologic: Alert and oriented X 3, no focal deficit noted      The patient has NYHA Class III-IV symptoms. In this split/shared patient encounter, I personally reviewed the patient's past medical history, conducted a current medical history and review of systems, and conducted a medically appropriate physical exam. I reviewed the available labs, prior imaging, and current medication list.   The above accounted for 22 minutes of clinical time. Lisa Wells. HARRY Gan    Assessment:     Active Hospital Problems    Severe Aortic stenosis  Mitral regurgitation   Hypertension  Anemia  History of COVID-19      Plan:     SAVR vs TAVR discussed. He prefers TAVR. In this split/shared patient encounter, I personally viewed the cardiac catheterization films and echocardiogram. I reviewed the current and past patient medical history obtained by [facility] HARRY.  I reviewed the available labs and current medication list. I discussed the cardiac catheterization and echocardiogram results and educated the patient/family on treatment options. I ordered additional tests and procedures as indicated and coordinated care for the selected treatment plan, which accounted for 25 minutes of clinical time.

## 2022-09-02 NOTE — Clinical Note
Prepped: right groin and right radial. Site was prepped. Prepped with: ChloraPrep. Patient was draped after wet prep solution dried.

## 2022-09-02 NOTE — PROGRESS NOTES
TRANSFER - IN REPORT:    Verbal [facility] [address] [place] [address] for routine progression of patient care      Report consisted of patients Situation, Background, Assessment and Recommendations(SBAR). Information from the following report(s) Procedure Summary was reviewed with the receiving nurse. Opportunity for questions and clarification was provided. Assessment completed upon patients arrival to unit and care assumed.

## 2022-09-02 NOTE — PROGRESS NOTES
[facility] by   Right radial access  No interventions  Right wrist TR band with 14ml  Versed 2mg IV  Fentanyl 50mcg IV  Access site soft. Clean, dry, and intact; with no signs of bleeding or hematoma  Pt. Tolerated procedure  TRANSFER - OUT REPORT:    Verbal report given to Griselda on Luisito Do  being transferred to [facility] for routine progression of patient care       Report consisted of patient's Situation, Background, Assessment and   Recommendations(SBAR). Information from the following report(s) Nurse Handoff Report and MAR was reviewed with the receiving nurse. Lines:   Peripheral IV 09/02/22 Right Antecubital (Active)        Opportunity for questions and clarification was provided.       Patient transported with:  Registered Nurse

## 2022-09-02 NOTE — DISCHARGE INSTRUCTIONS
HEART CATHETERIZATION/ANGIOGRAPHY DISCHARGE INSTRUCTIONS    Check puncture site frequently for swelling or bleeding. If there is any bleeding, apply pressure over the area with a clean towel or washcloth and call 911. Notify your doctor for any redness, swelling, drainage, or oozing from the puncture site. Notify your doctor for any fever or chills. If the extremity becomes cold, numb, or painful call Dr. Lizbeth Alcazar at [phone]. Activity should be limited for the next 48 hours. No heavy lifting, pushing, pulling  or strenuous activity for 48 hours. No heavy lifting (anything over 10 pounds) for 3 days. You may resume your usual diet. Drink more fluids than usual.  Have a responsible person drive you home and stay with you for at least 24 hours after your heart catheterization/angiography. You may remove bandage from your right arm in 24 hours. You may shower in 24 hours. No tub baths, hot tubs, or swimming for 1 week. Do not place any lotions, creams, powders, or ointments over puncture site for 1 week. You may place a clean band-aid over the puncture site each day for 5 days. Change daily. Sedation for a Medical Procedure: Care Instructions     You were given a sedative medication during your visit. While many of the effects will have worn   off before you leave; you may continue to feel some effects for several hours. Common side effects from sedation include:  Feeling sleepy. (Your doctors and nurses will make sure you are not too sleepy to go home.)  Nausea and vomiting. This usually does not last long. Feeling tired. How can you care for yourself at home? Activity    Don't do anything for 24 hours that requires attention to detail. It takes time for the medicine effects to completely wear off. Do not make important legal decisions for 24 hours. Do not sign any legal documents for 24 hours.      Do not drink alcohol today     For your safety, you should not drive or operate heavy machinery for the remainder of the day     Rest when you feel tired. Getting enough sleep will help you recover. Diet    You can eat your normal diet, unless your doctor gives you other instructions. If your stomach is upset, try clear liquids and bland, low-fat foods like plain toast or rice. Drink plenty of fluids (unless your doctor tells you not to). Don't drink alcohol for 24 hours. Medicines    Be safe with medicines. Read and follow all instructions on the label. If the doctor gave you a prescription medicine for pain, take it as prescribed. If you are not taking a prescription pain medicine, ask your doctor if you can take an over-the-counter medicine. If you think your pain medicine is making you sick to your stomach: Take your medicine after meals (unless your doctor has told you not to). Ask your doctor for a different pain medicine. I have read the above instructions and have had the opportunity to ask questions. Patient: ________________________   Date: _____________    Witness: _______________________   Date: _____________         Transcatheter Aortic Valve Implantation: Before Your Procedure  What is transcatheter aortic valve implantation? Transcatheter aortic valve implantation (HILDA) is a procedure to implant a replacement aortic valve in the heart. It is also called transcatheter aortic valve replacement (TAVR). Your doctor will use a thin, flexible tube called acatheter to put in your new heart valve. Your doctor will put the catheter into a blood vessel in your upper leg (groin) or chest. The doctor moves the catheter through the blood vessel and into yourheart. The replacement valve fits inside the catheter. The valve is made of tissue and metal. Your doctor will move the new valve into your damaged valve. It willexpand and work in place of the old valve. You may be asleep for the procedure, or you may get a sedative to help yourelax.  You won't feel pain when the catheter is put in the blood vessel. You may stay in the hospital for up to a few days. How do you prepare for the procedure? Procedures can be stressful. This information will help you understand what youcan expect. And it will help you safely prepare for your procedure. Preparing for the procedure    You will have several tests to get ready. These may include echocardiograms and a CT scan. Be sure you have someone to take you home. Anesthesia and pain medicine will make it unsafe for you to drive or get home on your own. Understand exactly what procedure is planned, along with the risks, benefits, and other options. If you take aspirin or some other blood thinner, ask your doctor if you should stop taking it before your procedure. Make sure that you understand exactly what your doctor wants you to do. These medicines increase the risk of bleeding. Tell your doctor ALL the medicines, vitamins, supplements, and herbal remedies you take. Some may increase the risk of problems during your procedure. Your doctor will tell you if you should stop taking any of them before the procedure and how soon to do it. Make sure your doctor and the hospital have a copy of your advance directive. If you don't have one, you may want to prepare one. It lets others know your health care wishes. It's a good thing to have before any type of surgery or procedure. What happens on the day of the procedure? Follow the instructions exactly about when to stop eating and drinking. If you don't, your procedure may be canceled. If your doctor told you to take your medicines on the day of the procedure, take them with only a sip of water. Take a bath or shower before you come in for your procedure. Do not apply lotions, perfumes, deodorants, or nail polish. Take off all jewelry and piercings. And take out contact lenses, if you wear them. At the hospital or surgery center   Bring a picture ID. You will be kept comfortable and safe by your anesthesia provider. The anesthesia may make you sleep. Or you may get medicine that relaxes you or puts you in a light sleep. You won't feel pain when the catheter is put in the blood vessel. The procedure may take about 2 to 3 hours. After the procedure, pressure may be applied to the area where the catheter was put in your blood vessel. This will help prevent bleeding. A small device may also be used to close the blood vessel. The area may be covered with a bandage or a compression device. Nurses will check your heart rate and blood pressure. The nurse will also check the catheter site for bleeding. If the catheter was put in your groin, you will need to lie still and keep your leg straight for up to a few hours. The nurse may put a weighted bag on your leg. You may have a bruise or a small lump where the catheter was put in your blood vessel. This is normal and will go away. When should you call your doctor? You have questions or concerns. You don't understand how to prepare for your procedure. You become ill before the procedure (such as fever, flu, or a cold). You need to reschedule or have changed your mind about having the procedure. Where can you learn more? Go to https://[employer]. org and sign in to your [email or web address] account. Enter K979 in the [facility] box to learn more about \"Transcatheter Aortic Valve Implantation: Before Your Procedure. \"     If you do not have an account, please click on the \"Sign Up Now\" link. Current as of: January 10, 2022               Content Version: 13.3  © 2796-1403 Healthwise, Incorporated. Care instructions adapted under license by [facility] ([facility]). If you have questions about a medical condition or this instruction, always ask your healthcare professional. [address] any warranty or liability for your use of this information.

## 2022-09-02 NOTE — PROGRESS NOTES
Patient received to [address] [place] [address] room # 15  Ambulatory from [facility]. Patient scheduled for [facility] today with Dr Sundeep Hanson. Procedure reviewed & questions answered, voiced good understanding consent obtained & placed on chart. All medications and medical history reviewed. Will prep patient per orders. Patient & family updated on plan of care. The patient has a fraility score of 3-MANAGING WELL, based on ambulation without assistance  .      Patient took Aspirin 324mg today at 0545 prior to arrival.

## 2022-09-06 ENCOUNTER — HOSPITAL ENCOUNTER (OUTPATIENT)
Dept: CT IMAGING | Age: 72
Discharge: HOME | End: 2022-09-09
Payer: OTHER GOVERNMENT

## 2022-09-06 ENCOUNTER — HOSPITAL ENCOUNTER (OUTPATIENT)
Dept: PULMONOLOGY | Age: 72
Discharge: HOME | End: 2022-09-09
Payer: OTHER GOVERNMENT

## 2022-09-06 DIAGNOSIS — I35.0 AORTIC VALVE STENOSIS, ETIOLOGY OF CARDIAC VALVE DISEASE UNSPECIFIED: ICD-10-CM

## 2022-09-06 DIAGNOSIS — R06.02 SOB (SHORTNESS OF BREATH): ICD-10-CM

## 2022-09-06 LAB — CREAT BLD-MCNC: 0.8 MG/DL (ref 0.8–1.5)

## 2022-09-06 PROCEDURE — 2580000003 HC RX 258: Performed by: INTERNAL MEDICINE

## 2022-09-06 PROCEDURE — 75574 CT ANGIO HRT W/3D IMAGE: CPT

## 2022-09-06 PROCEDURE — 94010 BREATHING CAPACITY TEST: CPT

## 2022-09-06 PROCEDURE — 94729 DIFFUSING CAPACITY: CPT

## 2022-09-06 PROCEDURE — 94726 PLETHYSMOGRAPHY LUNG VOLUMES: CPT

## 2022-09-06 PROCEDURE — 82565 ASSAY OF CREATININE: CPT

## 2022-09-06 PROCEDURE — 74174 CTA ABD&PLVS W/CONTRAST: CPT

## 2022-09-06 PROCEDURE — 70498 CT ANGIOGRAPHY NECK: CPT

## 2022-09-06 PROCEDURE — 6360000004 HC RX CONTRAST MEDICATION: Performed by: INTERNAL MEDICINE

## 2022-09-06 RX ORDER — 0.9 % SODIUM CHLORIDE 0.9 %
100 INTRAVENOUS SOLUTION INTRAVENOUS
Status: COMPLETED | OUTPATIENT
Start: 2022-09-06 | End: 2022-09-06

## 2022-09-06 RX ORDER — SODIUM CHLORIDE 0.9 % (FLUSH) 0.9 %
10 SYRINGE (ML) INJECTION
Status: DISCONTINUED | OUTPATIENT
Start: 2022-09-06 | End: 2022-09-10 | Stop reason: HOSPADM

## 2022-09-06 RX ADMIN — SODIUM CHLORIDE 100 ML: 9 INJECTION, SOLUTION INTRAVENOUS at 09:34

## 2022-09-06 RX ADMIN — IOPAMIDOL 75 ML: 755 INJECTION, SOLUTION INTRAVENOUS at 09:34

## 2022-09-06 ASSESSMENT — KANSAS CITY CARDIOMYOPATHY QUESTIONNAIRE (KCCQ12)
3. OVER THE PAST 2 WEEKS, ON AVERAGE, HOW MANY TIMES HAS FATIGUE LIMITED YOUR ABILITY TO DO WHAT YOU WANTED: 2
8A. OVER THE PAST 2 WEEKS, ON AVERAGE, HOW HAS HEART FAILURE LIMITED YOU ABILITY TO DO HOBBIES OR RECREATIONAL ACTIVITIES: 1
6. OVER THE PAST 2 WEEKS, HOW MUCH HAS YOUR HEART FAILURE LIMITED YOUR ENJOYMENT OF LIFE: 2
7. IF YOU HAD TO SPEND THE REST OF YOUR LIFE WITH YOUR HEART FAILURE THE WAY IT IS RIGHT NOW, HOW WOULD YOU FEEL ABOUT THIS?: 2
1B. OVER THE PAST 2 WEEKS, HOW MUCH WERE YOU LIMITED BY HEART FAILURE SYMPTOMS (SHORTNESS OF BREATH OR FATIGUE) WHEN WALKING 1 BLOCK ON LEVEL GROUND: 4
4. OVER THE PAST 2 WEEKS, ON AVERAGE, HOW MANY TIMES HAS SHORTNESS OF BREATH LIMITED YOUR ABILITY TO DO WHAT YOU WANTED: 2
1A. OVER THE PAST 2 WEEKS, HOW MUCH WERE YOU LIMITED BY HEART FAILURE SYMPTOMS (SHORTNESS OF BREATH OR FATIGUE) WHEN SHOWERING OR BATHING: 5
1C. OVER THE PAST 2 WEEKS, HOW MUCH WERE YOU LIMITED BY HEART FAILURE SYMPTOMS (SHORTNESS OF BREATH OR FATIGUE) WHEN HURRYING OR JOGGING (AS IF TO CATCH A BUS): 2
5. OVER THE PAST 2 WEEKS, ON AVERAGE, HOW MANY TIMES HAVE YOU BEEN FORCED TO SLEEP SITTING UP IN A CHAIR OR WITH AT LEAST 3 PILLOWS TO PROP YOU UP BECAUSE OF SHORTNESS OF BREATH?: 5
8C. OVER THE PAST 2 WEEKS, ON AVERAGE, HOW HAS HEART FAILURE LIMITED YOU ABILITY TO VISIT FAMILY AND FRIENDS OUR OF YOUR HOME: 1
2. OVER THE PAST 2 WEEKS, HOW MANY TIMES DID YOU HAVE SWELLING IN YOUR FEET, ANKLES OR LEGS WHEN YOU WOKE UP IN THE MORNING: 1
8B. OVER THE PAST 2 WEEKS, ON AVERAGE, HOW HAS HEART FAILURE LIMITED YOU ABILITY TO WORK OR DO HOUSEHOLD CHORES: 1

## 2022-09-06 NOTE — NURSE NAVIGATOR
Educational information/pamphlet provided to the pt regarding aortic stenosis and treatment options (SAVR and TAVR). Provided pt the CardioSmart tool for review as the shared decision making process continues between pt and valve team physicians. Also explained that the CT will evaluate the pt for a potential TAVR/SAVR. Contact information provided for any further questions.     Idalia Lobato, Structural Heart Navigator

## 2022-09-09 PROCEDURE — 75574 CT ANGIO HRT W/3D IMAGE: CPT | Performed by: INTERNAL MEDICINE

## 2022-09-19 ENCOUNTER — TELEPHONE (OUTPATIENT)
Dept: CASE MANAGEMENT | Age: 72
End: 2022-09-19

## 2022-09-19 ENCOUNTER — OFFICE VISIT (OUTPATIENT)
Dept: CARDIOLOGY CLINIC | Age: 72
End: 2022-09-19
Payer: OTHER GOVERNMENT

## 2022-09-19 VITALS
HEART RATE: 86 BPM | SYSTOLIC BLOOD PRESSURE: 134 MMHG | WEIGHT: 182 LBS | DIASTOLIC BLOOD PRESSURE: 72 MMHG | HEIGHT: 67 IN | BODY MASS INDEX: 28.56 KG/M2 | OXYGEN SATURATION: 98 %

## 2022-09-19 DIAGNOSIS — I35.0 SEVERE AORTIC STENOSIS BY PRIOR ECHOCARDIOGRAM: Primary | ICD-10-CM

## 2022-09-19 DIAGNOSIS — I10 HYPERTENSION, UNSPECIFIED TYPE: ICD-10-CM

## 2022-09-19 PROCEDURE — 1123F ACP DISCUSS/DSCN MKR DOCD: CPT | Performed by: INTERNAL MEDICINE

## 2022-09-19 PROCEDURE — 99215 OFFICE O/P EST HI 40 MIN: CPT | Performed by: INTERNAL MEDICINE

## 2022-09-19 RX ORDER — NAPROXEN 375 MG/1
375 TABLET ORAL 2 TIMES DAILY WITH MEALS
Status: ON HOLD | COMMUNITY
End: 2022-09-28 | Stop reason: HOSPADM

## 2022-09-19 ASSESSMENT — ENCOUNTER SYMPTOMS
COUGH: 0
BACK PAIN: 0
SHORTNESS OF BREATH: 1
ABDOMINAL PAIN: 0

## 2022-09-19 NOTE — TELEPHONE ENCOUNTER
Preadmission Testing for TAVR  When: September 26  Time: Arrive no later than 7:30 am  Where: ([facility]) [facility] [place] [address]  Instructions:          Eat breakfast before arrival on       Take your morning medications before you arrive. Do not take any vitamins or supplements. Bring your medications in the bottles or a list with the dosages so we can verify them      Bring someone with you to the visit if available      Bring your COVID vaccine card, insurance cards, 's license, [facility] of  or Living Will      Plan for a 2hour visit  At this appointment you will talk to anesthesia, and they will tell you the anesthesia plan for your surgery, you will have non fasting labs drawn, chest X-ray completed, EKG, and a breathing test (pulmonary function test-if not completed at the CT appt) completed. Transcatheter Aortic Valve Replacement (TAVR)   When: September 27  Arrival time: Someone from pre will call you on Monday, the day prior to your procedure, around lunch to let you know exactly what time to be at the hospital on Tuesday. Where: [address]   On Tuesday, you will come to the hospital ([address] at the front entrance (the [place] of [place] [address]), check in at registration on the left just inside the doors, and they will direct you where to go. Plan to stay in the hospital at least 1 night, maybe 2.      Call Berlin Welch for any questions [phone]

## 2022-09-19 NOTE — PROGRESS NOTES
[facility] CARDIOLOGY  [address] [place]  [address]      22      NAME:  Go De La Cruz  : 1950  MRN: [id number]      SUBJECTIVE:   Go De La Cruz is a 66 y.o. male seen for a follow up visit regarding the following:     Chief Complaint   Patient presents with    Hypertension       HPI:   66 y.o. male with critical aortic stenosis. He has type I bicuspid valve. He has completed TAVR work-up. He appears appropriate for both SAVR and TAVR. Patient has chosen to pursue TAVR. LVEF is normal.  Patient has normal coronaries and large iliacs bilaterally. He reports moderate dyspnea on exertion and some wheezing when lying flat which is been progressive over the last 2 to 3 months. Past Medical History, Past Surgical History, Family history, Social History, and Medications were all reviewed with the patient today and updated as necessary. Current Outpatient Medications   Medication Sig Dispense Refill    naproxen (NAPROSYN) 375 MG tablet Take 375 mg by mouth 2 times daily (with meals)      venlafaxine (EFFEXOR XR) 150 MG extended release capsule Take 150 mg by mouth daily      finasteride (PROSCAR) 5 MG tablet Take 5 mg by mouth daily      donepezil (ARICEPT) 10 MG tablet Take 5 mg by mouth nightly      OXcarbazepine (TRILEPTAL) 300 MG tablet Take 300 mg by mouth 2 times daily      amLODIPine (NORVASC) 10 MG tablet Take 10 mg by mouth daily      losartan (COZAAR) 50 MG tablet Take 50 mg by mouth daily      tamsulosin (FLOMAX) 0.4 MG capsule Take 0.4 mg by mouth daily      Zinc Acetate, Oral, (ZINC ACETATE PO) Take by mouth daily      Multiple Vitamin (MULTI-DAY PO) Take by mouth daily      FOLIC ACID PO Take by mouth daily      Ascorbic Acid (VITAMIN C PO) Take by mouth daily       No current facility-administered medications for this visit.      Patient Active Problem List    Diagnosis Date Noted    CONTRERAS (dyspnea on exertion) 2022     Priority: Medium discontinued medications. Patient has been instructed and agrees to call our office with any issues or other concerns related to their cardiac condition(s) and/or complaint(s). Return for TAVR TC-7.        Emmanuel Thompson MD  9/19/2022

## 2022-09-26 ENCOUNTER — ANESTHESIA EVENT (OUTPATIENT)
Dept: SURGERY | Age: 72
DRG: 267 | End: 2022-09-26
Payer: OTHER GOVERNMENT

## 2022-09-26 ENCOUNTER — HOSPITAL ENCOUNTER (OUTPATIENT)
Dept: PREADMISSION TESTING | Age: 72
Discharge: HOME | DRG: 267 | End: 2022-09-29
Payer: OTHER GOVERNMENT

## 2022-09-26 ENCOUNTER — HOSPITAL ENCOUNTER (OUTPATIENT)
Dept: GENERAL RADIOLOGY | Age: 72
Discharge: HOME | End: 2022-09-29

## 2022-09-26 VITALS
TEMPERATURE: 98.4 F | SYSTOLIC BLOOD PRESSURE: 132 MMHG | DIASTOLIC BLOOD PRESSURE: 77 MMHG | BODY MASS INDEX: 28.24 KG/M2 | WEIGHT: 179.9 LBS | RESPIRATION RATE: 16 BRPM | HEIGHT: 67 IN | OXYGEN SATURATION: 95 %

## 2022-09-26 LAB
ALBUMIN SERPL-MCNC: 3.9 G/DL (ref 3.2–4.6)
ALBUMIN/GLOB SERPL: 1.4 {RATIO} (ref 1.2–3.5)
ALP SERPL-CCNC: 110 U/L (ref 50–136)
ALT SERPL-CCNC: 22 U/L (ref 12–65)
ANION GAP SERPL CALC-SCNC: 6 MMOL/L (ref 4–13)
APPEARANCE UR: CLEAR
AST SERPL-CCNC: 14 U/L (ref 15–37)
BACTERIA SPEC CULT: NORMAL
BILIRUB SERPL-MCNC: 0.4 MG/DL (ref 0.2–1.1)
BILIRUB UR QL: NEGATIVE
BUN SERPL-MCNC: 13 MG/DL (ref 8–23)
CALCIUM SERPL-MCNC: 8.6 MG/DL (ref 8.3–10.4)
CHLORIDE SERPL-SCNC: 100 MMOL/L (ref 101–110)
CO2 SERPL-SCNC: 27 MMOL/L (ref 21–32)
COLOR UR: YELLOW
CREAT SERPL-MCNC: 0.74 MG/DL (ref 0.8–1.5)
EKG ATRIAL RATE: 90 BPM
EKG DIAGNOSIS: NORMAL
EKG P AXIS: 51 DEGREES
EKG P-R INTERVAL: 148 MS
EKG Q-T INTERVAL: 366 MS
EKG QRS DURATION: 118 MS
EKG QTC CALCULATION (BAZETT): 447 MS
EKG R AXIS: -43 DEGREES
EKG T AXIS: 109 DEGREES
EKG VENTRICULAR RATE: 90 BPM
ERYTHROCYTE [DISTWIDTH] IN BLOOD BY AUTOMATED COUNT: 12.5 % (ref 11.9–14.6)
EST. AVERAGE GLUCOSE BLD GHB EST-MCNC: 100 MG/DL
GLOBULIN SER CALC-MCNC: 2.7 G/DL (ref 2.3–3.5)
GLUCOSE SERPL-MCNC: 106 MG/DL (ref 65–100)
GLUCOSE UR STRIP.AUTO-MCNC: NEGATIVE MG/DL
HBA1C MFR BLD: 5.1 % (ref 4.8–5.6)
HCT VFR BLD AUTO: 39.9 % (ref 41.1–50.3)
HGB BLD-MCNC: 13.5 G/DL (ref 13.6–17.2)
HGB UR QL STRIP: NEGATIVE
HISTORY CHECK: NORMAL
INR PPP: 1.1
KETONES UR QL STRIP.AUTO: NEGATIVE MG/DL
LEUKOCYTE ESTERASE UR QL STRIP.AUTO: NEGATIVE
MAGNESIUM SERPL-MCNC: 2.2 MG/DL (ref 1.8–2.4)
MCH RBC QN AUTO: 32.6 PG (ref 26.1–32.9)
MCHC RBC AUTO-ENTMCNC: 33.8 G/DL (ref 31.4–35)
MCV RBC AUTO: 96.4 FL (ref 79.6–97.8)
NITRITE UR QL STRIP.AUTO: NEGATIVE
NRBC # BLD: 0 K/UL (ref 0–0.2)
PH UR STRIP: 6.5 [PH] (ref 5–9)
PLATELET # BLD AUTO: 310 K/UL (ref 150–450)
PMV BLD AUTO: 9.3 FL (ref 9.4–12.3)
POTASSIUM SERPL-SCNC: 4.1 MMOL/L (ref 3.5–5.1)
PROT SERPL-MCNC: 6.6 G/DL (ref 6.3–8.2)
PROT UR STRIP-MCNC: NEGATIVE MG/DL
PROTHROMBIN TIME: 15 SEC (ref 12.6–14.5)
RBC # BLD AUTO: 4.14 M/UL (ref 4.23–5.6)
SERVICE CMNT-IMP: NORMAL
SODIUM SERPL-SCNC: 133 MMOL/L (ref 136–145)
SP GR UR REFRACTOMETRY: 1.02 (ref 1–1.02)
UROBILINOGEN UR QL STRIP.AUTO: 0.2 EU/DL (ref 0.2–1)
WBC # BLD AUTO: 5.6 K/UL (ref 4.3–11.1)

## 2022-09-26 PROCEDURE — 85027 COMPLETE CBC AUTOMATED: CPT

## 2022-09-26 PROCEDURE — 87086 URINE CULTURE/COLONY COUNT: CPT

## 2022-09-26 PROCEDURE — 71046 X-RAY EXAM CHEST 2 VIEWS: CPT

## 2022-09-26 PROCEDURE — 87641 MR-STAPH DNA AMP PROBE: CPT

## 2022-09-26 PROCEDURE — 85610 PROTHROMBIN TIME: CPT

## 2022-09-26 PROCEDURE — 81003 URINALYSIS AUTO W/O SCOPE: CPT

## 2022-09-26 PROCEDURE — 93005 ELECTROCARDIOGRAM TRACING: CPT | Performed by: PHYSICIAN ASSISTANT

## 2022-09-26 PROCEDURE — 86920 COMPATIBILITY TEST SPIN: CPT

## 2022-09-26 PROCEDURE — 83735 ASSAY OF MAGNESIUM: CPT

## 2022-09-26 PROCEDURE — 86850 RBC ANTIBODY SCREEN: CPT

## 2022-09-26 PROCEDURE — 80053 COMPREHEN METABOLIC PANEL: CPT

## 2022-09-26 PROCEDURE — 83036 HEMOGLOBIN GLYCOSYLATED A1C: CPT

## 2022-09-26 ASSESSMENT — PAIN DESCRIPTION - ORIENTATION: ORIENTATION: RIGHT

## 2022-09-26 NOTE — PERIOP NOTE
Labs reviewed and routed to surgeon. PT of 15 called to Dr Christy Adams per anesthesia protocol. No orders received.    Latest Reference Range & Units 9/26/22 08:27   Sodium 136 - 145 mmol/L 133 (L)   Potassium 3.5 - 5.1 mmol/L 4.1   Chloride 101 - 110 mmol/L 100 (L)   CO2 21 - 32 mmol/L 27   BUN,BUNPL 8 - 23 MG/DL 13   Creatinine 0.8 - 1.5 MG/DL 0.74 (L)   Anion Gap 4 - 13 mmol/L 6   GFR Non-African American >60 ml/min/1.73m2 >60   GFR  >60 ml/min/1.73m2 >60   Magnesium 1.8 - 2.4 mg/dL 2.2   Glucose, Random 65 - 100 mg/dL 106 (H)   CALCIUM, SERUM, [id number] 8.3 - 10.4 MG/DL 8.6   ALBUMIN/GLOBULIN RATIO 1.2 - 3.5   1.4   Total Protein 6.3 - 8.2 g/dL 6.6   Albumin 3.2 - 4.6 g/dL 3.9   Globulin 2.3 - 3.5 g/dL 2.7   Alk Phosphatase 50 - 136 U/L 110   ALT 12 - 65 U/L 22   AST 15 - 37 U/L 14 (L)   Bilirubin 0.2 - 1.1 MG/DL 0.4   Hemoglobin A1C 4.8 - 5.6 % 5.1   eAG (mg/dL) mg/dL 100   WBC 4.3 - 11.1 K/uL 5.6   RBC 4.23 - 5.6 M/uL 4.14 (L)   Hemoglobin Quant 13.6 - 17.2 g/dL 13.5 (L)   Hematocrit 41.1 - 50.3 % 39.9 (L)   MCV 79.6 - 97.8 FL 96.4   MCH 26.1 - 32.9 PG 32.6   MCHC 31.4 - 35.0 g/dL 33.8   MPV 9.4 - 12.3 FL 9.3 (L)   RDW 11.9 - 14.6 % 12.5   Platelet Count 796 - 450 K/uL 310   Nucleated Red Blood Cells 0.0 - 0.2 K/uL 0.00   Prothrombin Time 12.6 - 14.5 sec 15.0 (H)   INR -   1.1   MSSA/MRSA SCREEN BY PCR  Rpt   (L): Data is abnormally low  (H): Data is abnormally high  Rpt: View report in Results Review for more information

## 2022-09-26 NOTE — PERIOP NOTE
UA reviewed   Latest Reference Range & Units 9/26/22 08:55   CULTURE, URINE  Rpt   Color, UA -   YELLOW   Glucose, UA NEG mg/dL Negative   Bilirubin, Urine NEG   Negative   Ketones, Urine NEG mg/dL Negative   Specific Gravity, UA 1.001 - 1.023   1.020   Blood, Urine NEG   Negative   Protein, UA NEG mg/dL Negative   Urobilinogen, Urine 0.2 - 1.0 EU/dL 0.2   Nitrite, Urine NEG   Negative   Leukocyte Esterase, Urine NEG   Negative   Appearance -   CLEAR   pH, Urine 5.0 - 9.0   6.5   Rpt: View report in Results Review for more information

## 2022-09-26 NOTE — PERIOP NOTE
Directly informed patient and or family member of pre op arrival time 5 on 9/27. All questions answered. Pre op instructions reviewed. Left contact information for any additional questions or needs.

## 2022-09-26 NOTE — PERIOP NOTE
Patient confirms name and . Order to obtain consent  found in EHR and  matches case posting. Patient verified. Labs/Orders per Surgeon: T&S,T&C, CBC, CMP, UA, Urine cult, EKG, A1C, Mg MRSA/MSSA, PT/INR, completed. CXR after PAT visit. PT=15 called to Dr Broderick Cesar per protocol. Labs routed to surgeon. POC Glucose: NA    EKG completed. Pt reported frequent \"spells\" of dizziness and diaphoretic. Pt denied CP. Pt stated they have been the same these past several weeks but wife states they are increasing. Pt had an episode while obtaining EKG (diaphoretic and \"hot\" but denied CP). Per anesthesia protocol called EKG result along with above symptoms to Dr Ladonna Dugan. No orders rec'd. This nurse reminded patient and his wife to go to ER/call 911 (prior to surgery tomorrow) if symptoms increased or if he had CP. Reminded them to go to [facility]. Patient and wife voiced understanding    MRSA swab, and clean catch urine obtained and sent to lab. Patient ID band applied, and patient taken to radiology with orders in hand for CXR     Labs drawn  including  blood bank labs. Blue blood bank wrist band placed on the patient's right wrist and pt verbalizes understanding not to remove the band until discharged from the hospital after surgery. Medication list updated today. Medication bottles visualized. Pt answered medical and surgical history questions to the best of their ability. Pre op instructions and education sheets given and reviewed with patient:  Heart instructions, blood transfusion education, hand hygiene education, smoking cessation education if applicable, pain management education. I/S instruction given and pt return demonstrated. Patient verbalizes understanding of all instructions. Pt instructed on importance of handwashing for infection prevention.  Pt verbalizes understanding to shower nightly with antibacterial soap & antiseptic wash provided at pre assessment on the night prior to and morning of surgery. Instructed to turn water off and wash with HIBICLENS/ Antiseptic Wash from chin to toes avoiding genitalia, then rinse after 1 minute. Pt verbalizes understanding to repeat this the morning of surgery. Patient verbalizes understanding that arrival time will be called to patient on the weekday prior to surgery date and that patient must check phone messages. If patient has any questions regarding arrival times call [phone]. You will received a call from the pre-op nurse by 5 pm on the business day prior to the scheduled procedure. If you have not spoken with a nurse, please check your voicemail. If you have not received an arrival time by 5 pm, please call [phone]. You may be required to pay a deductible or co-pay on the day of your procedure. You can pre-pay by calling  [phone] if your surgery is at the [facility].

## 2022-09-26 NOTE — PERIOP NOTE
PLEASE CONTINUE TAKING ALL PRESCRIPTION MEDICATIONS UP TO THE DAY OF SURGERY UNLESS OTHERWISE DIRECTED BELOW. Take ONLY the following medications on the day of surgery:   Effexor  Trileptal (oxcarbazepine)            Hold the following medications as specified:   Do not take Aricept (donepezil) tonight-9/26/22 or on day of surgery-9/27/22     DISCONTINUE all vitamins and supplements 7 days prior to surgery. DISCONTINUE Non-Steriodal Anti-Inflammatory (NSAIDS) such as Advil and Aleve 5 days prior to surgery. Comments       On the day before surgery please take Tylenol (Acetaminophen) 1000mg in the morning and then again before bed OR you may substitute for Tylenol 650 mg morning, noon, and evening. Please do not bring home medications with you on the day of surgery unless otherwise directed by your nurse. If you are instructed to bring home medications, please give them to your nurse as they will be administered by the nursing staff. If you have any questions, please call Allison Guevara [phone] or  [facility] [phone]. A copy of this note was provided to the patient for reference.

## 2022-09-27 ENCOUNTER — ANESTHESIA (OUTPATIENT)
Dept: SURGERY | Age: 72
DRG: 267 | End: 2022-09-27
Payer: OTHER GOVERNMENT

## 2022-09-27 ENCOUNTER — HOSPITAL ENCOUNTER (INPATIENT)
Age: 72
LOS: 1 days | Discharge: HOME | DRG: 267 | End: 2022-09-28
Attending: INTERNAL MEDICINE | Admitting: INTERNAL MEDICINE
Payer: OTHER GOVERNMENT

## 2022-09-27 ENCOUNTER — APPOINTMENT (OUTPATIENT)
Dept: NON INVASIVE DIAGNOSTICS | Age: 72
DRG: 267 | End: 2022-09-27
Attending: INTERNAL MEDICINE
Payer: OTHER GOVERNMENT

## 2022-09-27 DIAGNOSIS — Z95.2 S/P TAVR (TRANSCATHETER AORTIC VALVE REPLACEMENT): ICD-10-CM

## 2022-09-27 DIAGNOSIS — I35.0 AORTIC VALVE STENOSIS, ETIOLOGY OF CARDIAC VALVE DISEASE UNSPECIFIED: ICD-10-CM

## 2022-09-27 DIAGNOSIS — I35.0 AORTIC STENOSIS: ICD-10-CM

## 2022-09-27 DIAGNOSIS — I35.0 AORTIC STENOSIS, SEVERE: Primary | ICD-10-CM

## 2022-09-27 LAB
ACT AVERAGE - AAVG: 302 SEC
BASE DEFICIT BLD-SCNC: 0.5 MMOL/L
BASE DEFICIT BLD-SCNC: 1.8 MMOL/L
BASELINE ACT: 130 SEC
CA-I BLD-MCNC: 1.22 MMOL/L (ref 1.12–1.32)
CA-I BLD-MCNC: 1.23 MMOL/L (ref 1.12–1.32)
CO2 BLD-SCNC: 25 MMOL/L (ref 13–23)
CO2 BLD-SCNC: 25 MMOL/L (ref 13–23)
ECHO AR MAX VEL PISA: 3.3 M/S
ECHO AV ACCELERATION TIME: 114.18 MS
ECHO AV AREA PEAK VELOCITY: 2.6 CM2
ECHO AV AREA VTI: 2.2 CM2
ECHO AV AREA/BSA PEAK VELOCITY: 1.4 CM2/M2
ECHO AV AREA/BSA VTI: 1.2 CM2/M2
ECHO AV MEAN GRADIENT: 3 MMHG
ECHO AV MEAN VELOCITY: 0.8 M/S
ECHO AV PEAK GRADIENT: 6 MMHG
ECHO AV PEAK VELOCITY: 1.2 M/S
ECHO AV REGURGITANT PHT: 588.2 MILLISECOND
ECHO AV VELOCITY RATIO: 0.67
ECHO AV VTI: 27.1 CM
ECHO BSA: 1.94 M2
ECHO LVOT AREA: 4.2 CM2
ECHO LVOT AV VTI INDEX: 0.52
ECHO LVOT DIAM: 2.3 CM
ECHO LVOT MEAN GRADIENT: 1 MMHG
ECHO LVOT PEAK GRADIENT: 2 MMHG
ECHO LVOT PEAK VELOCITY: 0.8 M/S
ECHO LVOT STROKE VOLUME INDEX: 30.9 ML/M2
ECHO LVOT SV: 59 ML
ECHO LVOT VTI: 14.2 CM
EKG ATRIAL RATE: 57 BPM
EKG DIAGNOSIS: NORMAL
EKG P AXIS: 39 DEGREES
EKG P-R INTERVAL: 186 MS
EKG Q-T INTERVAL: 540 MS
EKG QRS DURATION: 152 MS
EKG QTC CALCULATION (BAZETT): 525 MS
EKG R AXIS: -79 DEGREES
EKG T AXIS: 110 DEGREES
EKG VENTRICULAR RATE: 57 BPM
GLUCOSE BLD STRIP.AUTO-MCNC: 120 MG/DL (ref 65–100)
GLUCOSE BLD STRIP.AUTO-MCNC: 124 MG/DL (ref 65–100)
HCO3 BLD-SCNC: 24.5 MMOL/L (ref 22–26)
HCO3 BLD-SCNC: 25 MMOL/L (ref 22–26)
HEPARIN BOLUS-PATIENT: NORMAL UNITS
HEPARIN BOLUS-PUMP: 0 UNITS
HEPARIN BOLUS-TOTAL: NORMAL UNITS
NT PRO BNP: 5258 PG/ML (ref 5–125)
PCO2 BLD: 43.3 MMHG (ref 35–45)
PCO2 BLD: 47.4 MMHG (ref 35–45)
PH BLD: 7.32 [PH] (ref 7.35–7.45)
PH BLD: 7.37 [PH] (ref 7.35–7.45)
PO2 BLD: 109 MMHG (ref 75–100)
PO2 BLD: 85 MMHG (ref 75–100)
POTASSIUM BLD-SCNC: 4.1 MMOL/L (ref 3.5–5.1)
POTASSIUM BLD-SCNC: 4.3 MMOL/L (ref 3.5–5.1)
PROJECTED HEPARIN CONCENTATION: 3.8 MG/KG
SAO2 % BLD: 95 %
SAO2 % BLD: 98 %
SERVICE CMNT-IMP: ABNORMAL
SERVICE CMNT-IMP: ABNORMAL
SLOPE: 67
SODIUM BLD-SCNC: 138 MMOL/L (ref 136–145)
SODIUM BLD-SCNC: 139 MMOL/L (ref 136–145)
SPECIMEN SITE: ABNORMAL
SPECIMEN SITE: ABNORMAL

## 2022-09-27 PROCEDURE — C1769 GUIDE WIRE: HCPCS | Performed by: INTERNAL MEDICINE

## 2022-09-27 PROCEDURE — 6360000002 HC RX W HCPCS: Performed by: INTERNAL MEDICINE

## 2022-09-27 PROCEDURE — 2580000003 HC RX 258: Performed by: INTERNAL MEDICINE

## 2022-09-27 PROCEDURE — 84132 ASSAY OF SERUM POTASSIUM: CPT

## 2022-09-27 PROCEDURE — 93321 DOPPLER ECHO F-UP/LMTD STD: CPT

## 2022-09-27 PROCEDURE — 6370000000 HC RX 637 (ALT 250 FOR IP): Performed by: PHYSICIAN ASSISTANT

## 2022-09-27 PROCEDURE — 2500000003 HC RX 250 WO HCPCS: Performed by: INTERNAL MEDICINE

## 2022-09-27 PROCEDURE — C1760 CLOSURE DEV, VASC: HCPCS | Performed by: INTERNAL MEDICINE

## 2022-09-27 PROCEDURE — 6370000000 HC RX 637 (ALT 250 FOR IP): Performed by: INTERNAL MEDICINE

## 2022-09-27 PROCEDURE — 33370 TCAT PLMT&RMVL CEPD PERQ: CPT | Performed by: INTERNAL MEDICINE

## 2022-09-27 PROCEDURE — 82947 ASSAY GLUCOSE BLOOD QUANT: CPT

## 2022-09-27 PROCEDURE — 6360000002 HC RX W HCPCS: Performed by: NURSE ANESTHETIST, CERTIFIED REGISTERED

## 2022-09-27 PROCEDURE — 6370000000 HC RX 637 (ALT 250 FOR IP): Performed by: ANESTHESIOLOGY

## 2022-09-27 PROCEDURE — 3600000017 HC SURGERY HYBRID ADDL 15MIN: Performed by: INTERNAL MEDICINE

## 2022-09-27 PROCEDURE — 33361 REPLACE AORTIC VALVE PERQ: CPT | Performed by: INTERNAL MEDICINE

## 2022-09-27 PROCEDURE — 3700000000 HC ANESTHESIA ATTENDED CARE: Performed by: INTERNAL MEDICINE

## 2022-09-27 PROCEDURE — C1884 EMBOLIZATION PROTECT SYST: HCPCS | Performed by: INTERNAL MEDICINE

## 2022-09-27 PROCEDURE — 6360000004 HC RX CONTRAST MEDICATION: Performed by: INTERNAL MEDICINE

## 2022-09-27 PROCEDURE — 2100000000 HC CCU R&B

## 2022-09-27 PROCEDURE — 85347 COAGULATION TIME ACTIVATED: CPT

## 2022-09-27 PROCEDURE — 83880 ASSAY OF NATRIURETIC PEPTIDE: CPT

## 2022-09-27 PROCEDURE — C1894 INTRO/SHEATH, NON-LASER: HCPCS | Performed by: INTERNAL MEDICINE

## 2022-09-27 PROCEDURE — 6360000002 HC RX W HCPCS: Performed by: PHYSICIAN ASSISTANT

## 2022-09-27 PROCEDURE — 3700000001 HC ADD 15 MINUTES (ANESTHESIA): Performed by: INTERNAL MEDICINE

## 2022-09-27 PROCEDURE — 2709999900 HC NON-CHARGEABLE SUPPLY: Performed by: INTERNAL MEDICINE

## 2022-09-27 PROCEDURE — 82803 BLOOD GASES ANY COMBINATION: CPT

## 2022-09-27 PROCEDURE — 2580000003 HC RX 258: Performed by: NURSE ANESTHETIST, CERTIFIED REGISTERED

## 2022-09-27 PROCEDURE — 3600000007 HC SURGERY HYBRID BASE: Performed by: INTERNAL MEDICINE

## 2022-09-27 PROCEDURE — 93005 ELECTROCARDIOGRAM TRACING: CPT | Performed by: INTERNAL MEDICINE

## 2022-09-27 PROCEDURE — 2580000003 HC RX 258: Performed by: ANESTHESIOLOGY

## 2022-09-27 PROCEDURE — 02RF38Z REPLACEMENT OF AORTIC VALVE WITH ZOOPLASTIC TISSUE, PERCUTANEOUS APPROACH: ICD-10-PCS | Performed by: INTERNAL MEDICINE

## 2022-09-27 PROCEDURE — 2780000006 HC MISC HEART VALVE: Performed by: INTERNAL MEDICINE

## 2022-09-27 PROCEDURE — 2500000003 HC RX 250 WO HCPCS: Performed by: NURSE ANESTHETIST, CERTIFIED REGISTERED

## 2022-09-27 PROCEDURE — 2700000000 HC OXYGEN THERAPY PER DAY

## 2022-09-27 PROCEDURE — 85520 HEPARIN ASSAY: CPT

## 2022-09-27 DEVICE — VALVE HRT TRANSCATH 29MM SAPIEN 3 ULTRA: Type: IMPLANTABLE DEVICE | Site: HEART | Status: FUNCTIONAL

## 2022-09-27 DEVICE — ANGIO-SEAL VIP VASCULAR CLOSURE DEVICE
Type: IMPLANTABLE DEVICE | Site: GROIN | Status: FUNCTIONAL
Brand: ANGIO-SEAL

## 2022-09-27 RX ORDER — VENLAFAXINE HYDROCHLORIDE 150 MG/1
150 CAPSULE, EXTENDED RELEASE ORAL DAILY
Status: DISCONTINUED | OUTPATIENT
Start: 2022-09-28 | End: 2022-09-28 | Stop reason: HOSPADM

## 2022-09-27 RX ORDER — SODIUM CHLORIDE, SODIUM LACTATE, POTASSIUM CHLORIDE, CALCIUM CHLORIDE 600; 310; 30; 20 MG/100ML; MG/100ML; MG/100ML; MG/100ML
INJECTION, SOLUTION INTRAVENOUS CONTINUOUS
Status: DISCONTINUED | OUTPATIENT
Start: 2022-09-27 | End: 2022-09-27 | Stop reason: HOSPADM

## 2022-09-27 RX ORDER — FENTANYL CITRATE 50 UG/ML
50 INJECTION, SOLUTION INTRAMUSCULAR; INTRAVENOUS EVERY 5 MIN PRN
Status: DISCONTINUED | OUTPATIENT
Start: 2022-09-27 | End: 2022-09-27 | Stop reason: HOSPADM

## 2022-09-27 RX ORDER — SODIUM CHLORIDE 9 MG/ML
INJECTION, SOLUTION INTRAVENOUS PRN
Status: DISCONTINUED | OUTPATIENT
Start: 2022-09-27 | End: 2022-09-28 | Stop reason: HOSPADM

## 2022-09-27 RX ORDER — DONEPEZIL HYDROCHLORIDE 5 MG/1
5 TABLET, FILM COATED ORAL NIGHTLY
Status: DISCONTINUED | OUTPATIENT
Start: 2022-09-27 | End: 2022-09-28 | Stop reason: HOSPADM

## 2022-09-27 RX ORDER — ASPIRIN 81 MG/1
81 TABLET, CHEWABLE ORAL DAILY
Status: DISCONTINUED | OUTPATIENT
Start: 2022-09-27 | End: 2022-09-27 | Stop reason: SDUPTHER

## 2022-09-27 RX ORDER — ONDANSETRON 2 MG/ML
4 INJECTION INTRAMUSCULAR; INTRAVENOUS EVERY 6 HOURS PRN
Status: DISCONTINUED | OUTPATIENT
Start: 2022-09-27 | End: 2022-09-27 | Stop reason: SDUPTHER

## 2022-09-27 RX ORDER — ONDANSETRON 2 MG/ML
4 INJECTION INTRAMUSCULAR; INTRAVENOUS
Status: DISCONTINUED | OUTPATIENT
Start: 2022-09-27 | End: 2022-09-27 | Stop reason: HOSPADM

## 2022-09-27 RX ORDER — MIDAZOLAM HYDROCHLORIDE 2 MG/2ML
2 INJECTION, SOLUTION INTRAMUSCULAR; INTRAVENOUS
Status: DISCONTINUED | OUTPATIENT
Start: 2022-09-27 | End: 2022-09-27 | Stop reason: HOSPADM

## 2022-09-27 RX ORDER — CLOPIDOGREL BISULFATE 75 MG/1
75 TABLET ORAL DAILY
Status: DISCONTINUED | OUTPATIENT
Start: 2022-09-28 | End: 2022-09-28 | Stop reason: HOSPADM

## 2022-09-27 RX ORDER — ACETAMINOPHEN 325 MG/1
650 TABLET ORAL EVERY 4 HOURS PRN
Status: DISCONTINUED | OUTPATIENT
Start: 2022-09-27 | End: 2022-09-27 | Stop reason: SDUPTHER

## 2022-09-27 RX ORDER — ACETAMINOPHEN 325 MG/1
650 TABLET ORAL EVERY 6 HOURS PRN
Status: DISCONTINUED | OUTPATIENT
Start: 2022-09-27 | End: 2022-09-28 | Stop reason: HOSPADM

## 2022-09-27 RX ORDER — SODIUM CHLORIDE 0.9 % (FLUSH) 0.9 %
5-40 SYRINGE (ML) INJECTION EVERY 12 HOURS SCHEDULED
Status: DISCONTINUED | OUTPATIENT
Start: 2022-09-27 | End: 2022-09-28 | Stop reason: HOSPADM

## 2022-09-27 RX ORDER — FINASTERIDE 5 MG/1
5 TABLET, FILM COATED ORAL DAILY
Status: DISCONTINUED | OUTPATIENT
Start: 2022-09-27 | End: 2022-09-28 | Stop reason: HOSPADM

## 2022-09-27 RX ORDER — EPHEDRINE SULFATE/0.9% NACL/PF 50 MG/5 ML
SYRINGE (ML) INTRAVENOUS PRN
Status: DISCONTINUED | OUTPATIENT
Start: 2022-09-27 | End: 2022-09-27 | Stop reason: SDUPTHER

## 2022-09-27 RX ORDER — CLOPIDOGREL BISULFATE 75 MG/1
300 TABLET ORAL ONCE
Status: COMPLETED | OUTPATIENT
Start: 2022-09-27 | End: 2022-09-27

## 2022-09-27 RX ORDER — ONDANSETRON 2 MG/ML
4 INJECTION INTRAMUSCULAR; INTRAVENOUS EVERY 6 HOURS PRN
Status: DISCONTINUED | OUTPATIENT
Start: 2022-09-27 | End: 2022-09-28 | Stop reason: HOSPADM

## 2022-09-27 RX ORDER — ASPIRIN 81 MG/1
81 TABLET ORAL DAILY
Status: DISCONTINUED | OUTPATIENT
Start: 2022-09-28 | End: 2022-09-27 | Stop reason: SDUPTHER

## 2022-09-27 RX ORDER — HEPARIN SODIUM 200 [USP'U]/100ML
INJECTION, SOLUTION INTRAVENOUS CONTINUOUS PRN
Status: COMPLETED | OUTPATIENT
Start: 2022-09-27 | End: 2022-09-27

## 2022-09-27 RX ORDER — MAGNESIUM HYDROXIDE/ALUMINUM HYDROXICE/SIMETHICONE 120; 1200; 1200 MG/30ML; MG/30ML; MG/30ML
15 SUSPENSION ORAL EVERY 6 HOURS PRN
Status: DISCONTINUED | OUTPATIENT
Start: 2022-09-27 | End: 2022-09-28 | Stop reason: HOSPADM

## 2022-09-27 RX ORDER — SODIUM CHLORIDE, SODIUM LACTATE, POTASSIUM CHLORIDE, CALCIUM CHLORIDE 600; 310; 30; 20 MG/100ML; MG/100ML; MG/100ML; MG/100ML
INJECTION, SOLUTION INTRAVENOUS CONTINUOUS PRN
Status: DISCONTINUED | OUTPATIENT
Start: 2022-09-27 | End: 2022-09-27 | Stop reason: SDUPTHER

## 2022-09-27 RX ORDER — HALOPERIDOL 5 MG/ML
1 INJECTION INTRAMUSCULAR
Status: DISCONTINUED | OUTPATIENT
Start: 2022-09-27 | End: 2022-09-27 | Stop reason: HOSPADM

## 2022-09-27 RX ORDER — LIDOCAINE HYDROCHLORIDE 10 MG/ML
INJECTION, SOLUTION INFILTRATION; PERINEURAL PRN
Status: DISCONTINUED | OUTPATIENT
Start: 2022-09-27 | End: 2022-09-27 | Stop reason: HOSPADM

## 2022-09-27 RX ORDER — LIDOCAINE HYDROCHLORIDE 10 MG/ML
1 INJECTION, SOLUTION INFILTRATION; PERINEURAL
Status: DISCONTINUED | OUTPATIENT
Start: 2022-09-27 | End: 2022-09-27 | Stop reason: HOSPADM

## 2022-09-27 RX ORDER — HYDROMORPHONE HYDROCHLORIDE 2 MG/ML
0.5 INJECTION, SOLUTION INTRAMUSCULAR; INTRAVENOUS; SUBCUTANEOUS EVERY 10 MIN PRN
Status: DISCONTINUED | OUTPATIENT
Start: 2022-09-27 | End: 2022-09-27 | Stop reason: HOSPADM

## 2022-09-27 RX ORDER — ASPIRIN 81 MG/1
81 TABLET, CHEWABLE ORAL DAILY
Status: DISCONTINUED | OUTPATIENT
Start: 2022-09-27 | End: 2022-09-27 | Stop reason: HOSPADM

## 2022-09-27 RX ORDER — DEXMEDETOMIDINE HYDROCHLORIDE 4 UG/ML
INJECTION, SOLUTION INTRAVENOUS CONTINUOUS PRN
Status: DISCONTINUED | OUTPATIENT
Start: 2022-09-27 | End: 2022-09-27 | Stop reason: SDUPTHER

## 2022-09-27 RX ORDER — PROPOFOL 10 MG/ML
INJECTION, EMULSION INTRAVENOUS CONTINUOUS PRN
Status: DISCONTINUED | OUTPATIENT
Start: 2022-09-27 | End: 2022-09-27 | Stop reason: SDUPTHER

## 2022-09-27 RX ORDER — SODIUM CHLORIDE 9 MG/ML
INJECTION, SOLUTION INTRAVENOUS CONTINUOUS
Status: ACTIVE | OUTPATIENT
Start: 2022-09-27 | End: 2022-09-27

## 2022-09-27 RX ORDER — DEXMEDETOMIDINE HYDROCHLORIDE 100 UG/ML
INJECTION, SOLUTION INTRAVENOUS PRN
Status: DISCONTINUED | OUTPATIENT
Start: 2022-09-27 | End: 2022-09-27 | Stop reason: SDUPTHER

## 2022-09-27 RX ORDER — ASPIRIN 81 MG/1
81 TABLET, CHEWABLE ORAL DAILY
Status: DISCONTINUED | OUTPATIENT
Start: 2022-09-28 | End: 2022-09-28 | Stop reason: HOSPADM

## 2022-09-27 RX ORDER — HEPARIN SODIUM 1000 [USP'U]/ML
INJECTION, SOLUTION INTRAVENOUS; SUBCUTANEOUS PRN
Status: DISCONTINUED | OUTPATIENT
Start: 2022-09-27 | End: 2022-09-27 | Stop reason: SDUPTHER

## 2022-09-27 RX ORDER — NITROGLYCERIN 0.4 MG/1
0.4 TABLET SUBLINGUAL EVERY 5 MIN PRN
Status: DISCONTINUED | OUTPATIENT
Start: 2022-09-27 | End: 2022-09-28 | Stop reason: HOSPADM

## 2022-09-27 RX ORDER — SODIUM CHLORIDE 0.9 % (FLUSH) 0.9 %
5-40 SYRINGE (ML) INJECTION PRN
Status: DISCONTINUED | OUTPATIENT
Start: 2022-09-27 | End: 2022-09-28 | Stop reason: HOSPADM

## 2022-09-27 RX ORDER — TAMSULOSIN HYDROCHLORIDE 0.4 MG/1
0.4 CAPSULE ORAL DAILY
Status: DISCONTINUED | OUTPATIENT
Start: 2022-09-28 | End: 2022-09-28 | Stop reason: HOSPADM

## 2022-09-27 RX ORDER — OXCARBAZEPINE 300 MG/1
300 TABLET, FILM COATED ORAL 2 TIMES DAILY
Status: DISCONTINUED | OUTPATIENT
Start: 2022-09-27 | End: 2022-09-28 | Stop reason: HOSPADM

## 2022-09-27 RX ORDER — HYDROCODONE BITARTRATE AND ACETAMINOPHEN 5; 325 MG/1; MG/1
1 TABLET ORAL EVERY 6 HOURS PRN
Status: DISCONTINUED | OUTPATIENT
Start: 2022-09-27 | End: 2022-09-28 | Stop reason: SDUPTHER

## 2022-09-27 RX ORDER — LOSARTAN POTASSIUM 50 MG/1
50 TABLET ORAL DAILY
Status: DISCONTINUED | OUTPATIENT
Start: 2022-09-28 | End: 2022-09-28 | Stop reason: HOSPADM

## 2022-09-27 RX ORDER — AMLODIPINE BESYLATE 10 MG/1
10 TABLET ORAL DAILY
Status: DISCONTINUED | OUTPATIENT
Start: 2022-09-28 | End: 2022-09-28 | Stop reason: HOSPADM

## 2022-09-27 RX ORDER — OXYCODONE HYDROCHLORIDE 5 MG/1
5 TABLET ORAL
Status: DISCONTINUED | OUTPATIENT
Start: 2022-09-27 | End: 2022-09-27 | Stop reason: HOSPADM

## 2022-09-27 RX ORDER — PROTAMINE SULFATE 10 MG/ML
INJECTION, SOLUTION INTRAVENOUS PRN
Status: DISCONTINUED | OUTPATIENT
Start: 2022-09-27 | End: 2022-09-27 | Stop reason: SDUPTHER

## 2022-09-27 RX ORDER — ACETAMINOPHEN 500 MG
1000 TABLET ORAL ONCE
Status: COMPLETED | OUTPATIENT
Start: 2022-09-27 | End: 2022-09-27

## 2022-09-27 RX ORDER — POLYETHYLENE GLYCOL 3350 17 G/17G
17 POWDER, FOR SOLUTION ORAL DAILY PRN
Status: DISCONTINUED | OUTPATIENT
Start: 2022-09-27 | End: 2022-09-28 | Stop reason: HOSPADM

## 2022-09-27 RX ADMIN — FINASTERIDE 5 MG: 5 TABLET, FILM COATED ORAL at 14:03

## 2022-09-27 RX ADMIN — PHENYLEPHRINE HYDROCHLORIDE 100 MCG: 10 INJECTION INTRAVENOUS at 11:36

## 2022-09-27 RX ADMIN — PHENYLEPHRINE HYDROCHLORIDE 100 MCG: 10 INJECTION INTRAVENOUS at 11:29

## 2022-09-27 RX ADMIN — HYDROCORTISONE SODIUM SUCCINATE 100 MG: 100 INJECTION, POWDER, FOR SOLUTION INTRAMUSCULAR; INTRAVENOUS at 12:19

## 2022-09-27 RX ADMIN — DEXMEDETOMIDINE 20 MCG: 100 INJECTION, SOLUTION, CONCENTRATE INTRAVENOUS at 11:12

## 2022-09-27 RX ADMIN — Medication 5 MG: at 11:23

## 2022-09-27 RX ADMIN — SODIUM CHLORIDE, SODIUM LACTATE, POTASSIUM CHLORIDE, AND CALCIUM CHLORIDE: 600; 310; 30; 20 INJECTION, SOLUTION INTRAVENOUS at 11:06

## 2022-09-27 RX ADMIN — SODIUM CHLORIDE, PRESERVATIVE FREE 10 ML: 5 INJECTION INTRAVENOUS at 19:46

## 2022-09-27 RX ADMIN — DONEPEZIL HYDROCHLORIDE 5 MG: 5 TABLET, FILM COATED ORAL at 19:48

## 2022-09-27 RX ADMIN — Medication 1 AMPULE: at 19:49

## 2022-09-27 RX ADMIN — Medication 5 MG: at 11:27

## 2022-09-27 RX ADMIN — PROPOFOL 25 MCG/KG/MIN: 10 INJECTION, EMULSION INTRAVENOUS at 11:15

## 2022-09-27 RX ADMIN — SODIUM CHLORIDE: 9 INJECTION, SOLUTION INTRAVENOUS at 13:15

## 2022-09-27 RX ADMIN — CLOPIDOGREL BISULFATE 300 MG: 75 TABLET ORAL at 14:03

## 2022-09-27 RX ADMIN — DEXMEDETOMIDINE 20 MCG: 100 INJECTION, SOLUTION, CONCENTRATE INTRAVENOUS at 11:16

## 2022-09-27 RX ADMIN — PROTAMINE SULFATE 100 MG: 10 INJECTION, SOLUTION INTRAVENOUS at 12:28

## 2022-09-27 RX ADMIN — DEXMEDETOMIDINE 20 MCG: 100 INJECTION, SOLUTION, CONCENTRATE INTRAVENOUS at 11:06

## 2022-09-27 RX ADMIN — CEFAZOLIN 2000 MG: 10 INJECTION, POWDER, FOR SOLUTION INTRAVENOUS at 18:59

## 2022-09-27 RX ADMIN — ACETAMINOPHEN 1000 MG: 500 TABLET, FILM COATED ORAL at 09:51

## 2022-09-27 RX ADMIN — OXCARBAZEPINE 300 MG: 300 TABLET, FILM COATED ORAL at 19:48

## 2022-09-27 RX ADMIN — FENTANYL CITRATE 25 MCG: 50 INJECTION INTRAMUSCULAR; INTRAVENOUS at 11:16

## 2022-09-27 RX ADMIN — HYDROCORTISONE SODIUM SUCCINATE 100 MG: 100 INJECTION, POWDER, FOR SOLUTION INTRAMUSCULAR; INTRAVENOUS at 12:32

## 2022-09-27 RX ADMIN — SODIUM CHLORIDE, POTASSIUM CHLORIDE, SODIUM LACTATE AND CALCIUM CHLORIDE: 600; 310; 30; 20 INJECTION, SOLUTION INTRAVENOUS at 10:10

## 2022-09-27 RX ADMIN — Medication 2 G: at 11:18

## 2022-09-27 RX ADMIN — FENTANYL CITRATE 25 MCG: 50 INJECTION INTRAMUSCULAR; INTRAVENOUS at 11:21

## 2022-09-27 RX ADMIN — DEXMEDETOMIDINE HYDROCHLORIDE 1 MCG/KG/HR: 4 INJECTION, SOLUTION INTRAVENOUS at 11:15

## 2022-09-27 RX ADMIN — ASPIRIN 81 MG: 81 TABLET, CHEWABLE ORAL at 10:41

## 2022-09-27 RX ADMIN — DEXMEDETOMIDINE 20 MCG: 100 INJECTION, SOLUTION, CONCENTRATE INTRAVENOUS at 11:09

## 2022-09-27 RX ADMIN — Medication 3 AMPULE: at 10:06

## 2022-09-27 RX ADMIN — HEPARIN SODIUM 22000 UNITS: 1000 INJECTION, SOLUTION INTRAVENOUS; SUBCUTANEOUS at 11:51

## 2022-09-27 ASSESSMENT — PAIN SCALES - GENERAL
PAINLEVEL_OUTOF10: 0

## 2022-09-27 ASSESSMENT — PAIN - FUNCTIONAL ASSESSMENT: PAIN_FUNCTIONAL_ASSESSMENT: 0-10

## 2022-09-27 ASSESSMENT — ENCOUNTER SYMPTOMS: SHORTNESS OF BREATH: 1

## 2022-09-27 NOTE — BRIEF OP NOTE
Brief Postoperative Note      Patient: Saba Camp  YOB: 1950  MRN: [id number]    Date of Procedure: 9/27/2022    Pre-Op Diagnosis: Aortic valve stenosis, etiology of cardiac valve disease unspecified [I35.0]    Post-Op Diagnosis: Same       Procedure(s):  Transcatheter aortic valve replacement    Surgeon(s):  MD Vera Lopez MD    Assistant:  * No surgical staff found *    Anesthesia: Monitor Anesthesia Care    Estimated Blood Loss (mL): Minimal    Complications: None    Specimens:   * No specimens in log *    Implants:  Implant Name Type Inv.  Item Serial No.  Lot No. LRB No. Used Action   VALVE HRT TRANSCATH 29MM VIVI 3 ULTRA - [id number] Aortic valves VALVE HRT TRANSCATH 29MM VIVI 3 ULTRA [id number] [id number]  N/A 1 Implanted   DEVICE CLSR 6FR 0.035IN VASC V TWST INTEGR PLATFRM - [id number]  DEVICE CLSR 6FR 0.035IN VASC V TWST INTEGR PLATFRM  [employer]  Left 1 Implanted         Drains: * No LDAs found *    Findings: as    Electronically signed by Tomy Avelar MD on 9/27/2022 at 12:32 PM

## 2022-09-27 NOTE — Clinical Note
Single view of the aortic root obtained using power injection. Rate = 15 mL/sec. Total volume = 20 mL.  Repeated

## 2022-09-27 NOTE — PROGRESS NOTES
TRANSFER - IN REPORT:    Verbal report received from [address] on Dimitris Torres  being received from OR for routine post-op      Report consisted of patient's Situation, Background, Assessment and   Recommendations(SBAR). Information from the following report(s) Nurse Handoff Report, Surgery Report, Intake/Output, MAR, Recent Results, Med Rec Status, and Cardiac Rhythm SR  was reviewed with the receiving nurse. Opportunity for questions and clarification was provided. Assessment completed upon patient's arrival to unit and care assumed.

## 2022-09-27 NOTE — Clinical Note
Prepped: bilateral groin and right radial. Site was clipped and prepped. Prepped with: ChloraPrep. Patient was draped after wet prep solution dried.

## 2022-09-27 NOTE — ANESTHESIA PRE PROCEDURE
Department of Anesthesiology  Preprocedure Note       Name:  Jacobo Garcia   Age:  66 y.o.  :  1950                                          MRN:  [id number]         Date:  2022      Surgeon: Jeison Ta):  Claudean Pilling, MD Fayne Hau, MD    Procedure: Procedure(s):  Transcatheter aortic valve replacement  TRANSCATHETER AORTIC VALVE REPLACEMENT    Medications prior to admission:   Prior to Admission medications    Medication Sig Start Date End Date Taking?  Authorizing Provider   naproxen (NAPROSYN) 375 MG tablet Take 375 mg by mouth 2 times daily (with meals)    Historical Provider, MD   venlafaxine (EFFEXOR XR) 150 MG extended release capsule Take 150 mg by mouth daily    Historical Provider, MD   finasteride (PROSCAR) 5 MG tablet Take 5 mg by mouth daily  Patient not taking: Reported on 2022    Historical Provider, MD   donepezil (ARICEPT) 10 MG tablet Take 5 mg by mouth nightly    Historical Provider, MD   OXcarbazepine (TRILEPTAL) 300 MG tablet Take 300 mg by mouth 2 times daily    Historical Provider, MD   amLODIPine (NORVASC) 10 MG tablet Take 10 mg by mouth daily  Patient not taking: Reported on 2022    Historical Provider, MD   losartan (COZAAR) 50 MG tablet Take 50 mg by mouth daily  Patient not taking: Reported on 2022    Historical Provider, MD   tamsulosin (FLOMAX) 0.4 MG capsule Take 0.4 mg by mouth daily  Patient not taking: No sig reported    Historical Provider, MD   Zinc Acetate, Oral, (ZINC ACETATE PO) Take by mouth daily    Historical Provider, MD   Multiple Vitamin (MULTI-DAY PO) Take by mouth daily    Historical Provider, MD   FOLIC ACID PO Take by mouth daily    Historical Provider, MD   Ascorbic Acid (VITAMIN C PO) Take by mouth daily    Historical Provider, MD       Current medications:    Current Facility-Administered Medications   Medication Dose Route Frequency Provider Last Rate Last Admin    ceFAZolin (ANCEF) 2000 mg in sterile water 20 mL IV syringe  2,000 mg IntraVENous On Call to [address] [place] [address]        alcohol 62% (NOZIN) nasal  3 ampule  3 ampule Topical Once [employer], [place]        lidocaine 1 % injection 1 mL  1 mL IntraDERmal Once PRN Virgen Rodriguez MD        acetaminophen (TYLENOL) tablet 1,000 mg  1,000 mg Oral Once Virgen Rodriguez MD        famotidine (PEPCID) 20 mg in sodium chloride (PF) 10 mL injection  20 mg IntraVENous Once PRN Virgen Rodriguez MD        midazolam PF (VERSED) injection 2 mg  2 mg IntraVENous Once PRN Virgen Rodriguez MD           Allergies: Allergies   Allergen Reactions    Aspartame Other (See Comments)     seizures       Problem List:    Patient Active Problem List   Diagnosis Code    Wound, open, abdominal wall, anterior S31.109A    Left lower quadrant abdominal pain R10.32    CONTRERAS (dyspnea on exertion) R06.00    Severe aortic stenosis by prior echocardiogram I35.0    Hypertension I10    Accelerating angina ([address] I20.0       Past Medical History:        Diagnosis Date    History of PFTs 09/06/2022    History of transesophageal echocardiography ([facility]) 09/02/2022    EF of 50 - 55%;  Moderate regurgitation mitral & tricuspid; severe aortic stenosis    Hypertension     Neck injury 01/25/2022    fractured C2; reports neck brace and no surgery    Seizures ([facility] [address] 1995       Past Surgical History:        Procedure Laterality Date    ABDOMEN SURGERY      repaired chain saw laceration    CARDIAC PROCEDURE N/A 09/02/2022    Left heart cath / coronary angiography performed by Hendrick Meckel, MD at [facility] N/A 09/02/2022    Angiography lower ext bilat performed by Hendrick Meckel, MD at [facility] Left 2015    left tibia fx and kelsey placed    FL ABDOMEN SURGERY PROC UNLISTED      SHOULDER SURGERY Left     shoulder fracture    UMBILICAL HERNIA REPAIR         Social History:    Social History     Tobacco Use    Smoking status: Never    Smokeless tobacco: Never   Substance Use Topics    Alcohol use: No                                Counseling given: Not Answered      Vital Signs (Current): There were no vitals filed for this visit. BP Readings from Last 3 Encounters:   09/26/22 132/77   09/19/22 134/72   09/02/22 136/78       NPO Status:                                                                                 BMI:   Wt Readings from Last 3 Encounters:   09/26/22 179 lb 14.4 oz (81.6 kg)   09/19/22 182 lb (82.6 kg)   09/02/22 180 lb (81.6 kg)     There is no height or weight on file to calculate BMI.    CBC:   Lab Results   Component Value Date/Time    WBC 5.6 09/26/2022 08:27 AM    RBC 4.14 09/26/2022 08:27 AM    HGB 13.5 09/26/2022 08:27 AM    HCT 39.9 09/26/2022 08:27 AM    MCV 96.4 09/26/2022 08:27 AM    RDW 12.5 09/26/2022 08:27 AM     09/26/2022 08:27 AM       CMP:   Lab Results   Component Value Date/Time     09/26/2022 08:27 AM    K 4.1 09/26/2022 08:27 AM     09/26/2022 08:27 AM    CO2 27 09/26/2022 08:27 AM    BUN 13 09/26/2022 08:27 AM    CREATININE 0.74 09/26/2022 08:27 AM    GFRAA >60 09/26/2022 08:27 AM    AGRATIO 1.2 09/01/2020 08:20 PM    LABGLOM >60 09/26/2022 08:27 AM    GLUCOSE 106 09/26/2022 08:27 AM    PROT 6.6 09/26/2022 08:27 AM    CALCIUM 8.6 09/26/2022 08:27 AM    BILITOT 0.4 09/26/2022 08:27 AM    ALKPHOS 110 09/26/2022 08:27 AM    ALKPHOS 140 09/01/2020 08:20 PM    AST 14 09/26/2022 08:27 AM    ALT 22 09/26/2022 08:27 AM       POC Tests: No results for input(s): POCGLU, POCNA, POCK, POCCL, POCBUN, POCHEMO, POCHCT in the last 72 hours.     Coags:   Lab Results   Component Value Date/Time    PROTIME 15.0 09/26/2022 08:27 AM    INR 1.1 09/26/2022 08:27 AM       HCG (If Applicable): No results found for: PREGTESTUR, PREGSERUM, HCG, HCGQUANT     ABGs: No results found for: PHART, PO2ART, JKJ2HYR, UWU3GQN, BEART, H6TMUDQX     Type & Screen (If Applicable):  No results found for: LABABO, LABRH    Drug/Infectious Status (If Applicable):  No results found for: HIV, HEPCAB    COVID-19 Screening (If Applicable): No results found for: COVID19        Anesthesia Evaluation  Patient summary reviewed  Airway: Mallampati: II          Dental: normal exam         Pulmonary:Negative Pulmonary ROS breath sounds clear to auscultation  (+) shortness of breath:                             Cardiovascular:  Exercise tolerance: good (>4 METS),   (+) hypertension:, valvular problems/murmurs: AS and MR, CHF (low normal EF ):, CONTRERAS:, pulmonary hypertension (1/3 systolic):,     (-) past MI, murmur and peripheral edema      Rhythm: regular  Rate: normal                    Neuro/Psych:   Negative Neuro/Psych ROS     (-) CVA           GI/Hepatic/Renal: Neg GI/Hepatic/Renal ROS            Endo/Other: Negative Endo/Other ROS                    Abdominal:             Vascular: negative vascular ROS. Other Findings:           Anesthesia Plan      TIVA     ASA 4       Induction: intravenous. arterial line    Anesthetic plan and risks discussed with patient.                         Maya Kinney MD   9/27/2022

## 2022-09-27 NOTE — CARE COORDINATION
Chart screened by  for discharge planning. No needs identified at this time. Please consult  if any new issues arise.          09/27/22 1321   Condition of Participation: Discharge Planning   The Plan for Transition of Care is related to the following treatment goals: Pending clinical progress

## 2022-09-27 NOTE — Clinical Note
Sheath was removed in the right femoral artery. Site closed by Perclose and manual compression.  Alter Wall 79

## 2022-09-27 NOTE — Clinical Note
TRANSFER - IN REPORT:     Verbal report received from: Pre Op RN. Report consisted of patient's Situation, Background, Assessment and   Recommendations(SBAR). Opportunity for questions and clarification was provided. Assessment completed upon patient's arrival to unit and care assumed. Patient transported with a Registered Nurse.

## 2022-09-27 NOTE — Clinical Note
TRANSFER - OUT REPORT:     Verbal report given to: CVICU RN, (at bedside). Report consisted of patient's Situation, Background, Assessment and   Recommendations(SBAR). Opportunity for questions and clarification was provided. Patient transported with a Registered Nurse. Patient transported to: CVICU .

## 2022-09-27 NOTE — PROGRESS NOTES
PT was resting in bed. [facility] spoke gently to PT. PT expressed PT's gratitude to God, the Medical Staff, Family, and all those praying for PT. PT expressed gratitude for surgery and PT's medical care. PT is Worship and attends [employer] in [facility]. [facility] offered prayer and support. Rev. Rajat Chase M.Div.

## 2022-09-27 NOTE — BRIEF OP NOTE
Operative Report    Patient: Alisha Cantu MRN: [id number]  SSN: [id number]    YOB: 1950  Age: 66 y.o. Sex: male       Date of Surgery: 9/27/2022     Preoperative Diagnosis: Aortic valve stenosis, etiology of cardiac valve disease unspecified [I35.0]     Postoperative Diagnosis: * No post-op diagnosis entered *     Primary Cardiothoracic Surgeon: Temo Cox MD     Primary Interventional Cardiologist: Jeri Burkett MD    Anesthesia: Monitor Anesthesia Care     Procedure: Procedure(s):  Transcatheter aortic valve replacement   Transcatheter aortic valve replacement (29 mm Graff Eliane 3 transcatheter aortic valve via right common femoral artery percutaneous approach). Findings:  Successful deployment of a 29 mm Graff Eliane 3 transcatheter aortic valve via right common femoral artery percutaneous approach. good LV function with trace perivalvular regurgitation. The patient was in normal sinus rhythm at the conclusion of the procedure. Indication for Procedure: The patient is a 66 y.o. male with symptomatic severe aortic stenosis. He was not felt to be a candidate for surgical aortic valve replacement and after discussion at the multidisciplinary valve board transcatheter aortic valve replacement was recommended. After a thorough discussion of all the risks and proposed benefits, the patient agreed to proceed. Procedure in Detail:   Patient premedicated and brought to the operating suite. Time-out performed. Standard monitoring lines placed, and the patient was intubated and placed under general anesthesia without event. Transesophageal echocardiogram was performed, confirming the above valve pathology. Intravenous cefazolin was administered. Patient prepped and draped in standard, meticulous surgical fashion. Ioban drapes were used. Access to the left common femoral artery and vein was obtained under ultrasound guidance, and 6-Slovak sheaths were placed.  A temporary transvenous right ventricular pacing wire was positioned via the femoral vein. Next, a micropuncture kit was used to gain access to the right common femoral artery and a 6-Faroese sheath was placed. This was upsized and the 16-Faroese Ventura transcatheter heart valve sheath was placed after deploying Perclose devices in a pre-close fashion. Next, a balloon valvuloplasty was performed. The patient remained hemodynamically stable. The 29mm Eliane 3 valve was then prepped and mounted onto the delivery system in the correct orientation. This was then positioned in the ascending aorta, and then advanced across the aortic valve and deployed successfully. The details of this are dictated under a separate cover in Dr. Todd Webb report. Following deployment, an aortogram and transesophageal echocardiogram were performed, which demonstrated a trace aortic regurgitation and a well-seated, well-functioning prosthetic valve in the aortic position. The patient remained hemodynamically stable and in normal sinus rhythm. At this point, the delivery system and the sheath were removed from the right common femoral artery, and hemostasis was obtained using the Perclose sutures. Sheaths were also removed from the left common femoral artery and vein. At the conclusion of the procedure, the patient was hemodynamically stable and hemostasis was good. The patient was then transported to telemetry in stable condition. At the end of the case, all sharp, sponge, and instrument counts were reported as being correct. Estimated Blood Loss:  minimal    Tourniquet Time: * No tourniquets in log *      Implants:   Implant Name Type Inv.  Item Serial No.  Lot No. LRB No. Used Action   VALVE HRT TRANSCATH 29MM ELIANE 3 ULTRA - [id number] Aortic valves VALVE HRT TRANSCATH 29MM ELIANE 3 ULTRA [id number] [employer] DANIELA-  N/A 1 Implanted   DEVICE CLSR 6FR 0.035IN VASC V TWST INTEGR PLATFR - [id number] DEVICE CLSR 6FR 0.035IN VASC V TWST INTEGR PLATFRM  [employer] DANIEL[employer]-WD  Left 1 Implanted             Specimens: * No specimens in log *        Drains: None                Complications: None    Counts: Sponge and needle counts were correct times two.     Signed By:  Lorrie Asencio MD     September 27, 2022

## 2022-09-27 NOTE — DISCHARGE INSTRUCTIONS
HOME INSTRUCTION FOLLOWING TRANSCATHETER AORTIC VALVE REPLACEMENT (TAVR)      What is my main problem? You have just had your diseased aortic valve replaced with an artificial valve. Below is what you need to do when you go home. What do I need to do? ACTIVITY:  · Weigh yourself every day in the morning after using the bathroom. · Get up and get dressed every day. Do not stay in bed. · Set a daily routine. Have someone stay with you for the first 5 days, do not be alone for long periods of time  · NO DRIVING For 5 days or until you are no longer sore. You may ride in a car. · Please let your doctor know if you need to leave town before your first follow-up visit. · Do NOT lift more than ten pounds, No Straining, Stooping, or Squatting for one week. · You may walk up and down a few steps but don't do a lot of stairs  · Walk as much as you can. When you are tired, rest.  · Cough and deep breath and use your incentive spirometer 10 times each hour when you are awake. We encourage Cardiac rehab, this usually STARTS a couple weeks after your TAVR      DIET:  · We recommend the Cardiac diet. Your nurse can provide a printed handout to you on this diet. INCISION CARE:  · Shower every day. You may shower after you go home  No tub baths for the first week you are at home. · Cleanse wounds with mild soap and water, pat dry. Keep wounds dry. If you need to add a Band-Aid you can but no ointments, powders, neosporin, or lotions. · A small amount of bloody or clear drainage is normal.  · Watch for signs of infection: redness, incision hot to the touch, fever greater than 101 degrees, swelling at the groin incision site, or discolored drainage from your incision. MEDICATIONS:  · DO NOT STOP TAKING YOUR MEDICINES WITHOUT SPEAKING WITH YOUR CARDIOLOGIST! · Take your pills as ordered at the time of discharge. · Do  not stop taking any medicine without first discussing it with your doctor.   · Avoid all over the counter medications, herbal, and natural supplements unless you have discussed them with your doctor. · It is important to follow your doctor's orders, especially if blood thinning drugs are prescribed. Your doctor will monitor your medicine and advise you when or if you can stop taking it. Why is it important for me to do this? · Regular check-ups by your cardiologist are very important. It is easier for patients with a heart valve replacement to get infections, which could lead to future heart damage. · Before any dental work or surgery is done, tell your dentist or surgeon about your heart valve replacement. Wait for 3-4 months to have any dental work completed. You may need to take antibiotics before and after some medical procedures to reduce the risk of infection. · Always tell the doctor (or medical technician) that you have an implanted heart valve. · Before an MRI (magnetic resonance imaging) procedure, always notify the doctor (or medical technician) that you have an implanted heart valve. What can I expect? HEALTHY HABITS: To maintain a healthy physical condition, we have the following suggestions:  · No smoking!!! If you need help to stop smoking, please contact your doctor. · Attempt to achieve and maintain your ideal body weight. · Walking is great exercise for the body and the mind! We suggest that you walk as much as you can. When to call the doctor or speak with the nurse? · Weight gain of 3 pounds in one day or 5 pounds in one week. Weight gain is NOT normal.  · Swelling of the legs, ankles, or feet  · Increasing shortness of breath  · Change in the color or temperature of your lower legs and feet. · Develop abdominal pain or unrelieved nausea and/or vomiting. · Develop any numbness, tingling, or limited movement of your arms or legs.   · Signs of infection: redness, incision hot to the touch, fevers greater than 101 degrees, or colored drainage from your incision. · Seroma is an accumulation of fluid in the tissues at the groin surgical site. Symptoms may include: a lump near the groin surgical site, clear fluid draining from the site, redness, warmth, or swelling. ADDITIONAL INFORMATION:  · Your follow-up echocardiogram has been scheduled along with your 30-day TAVR follow up visit. On this visit you will also have nonfasting lab work drawn at the office. This visit is very important for you, so be sure to attend this visit. We are here for you! If you have any questions, please feel free to call us. Office numbers are:    Dr. Jing Weaver  [phone]                   Valve Coordinator- Vi Massey Filter from Nurse    PATIENT INSTRUCTIONS:    After general anesthesia or intravenous sedation, for 24 hours or while taking prescription Narcotics:  Limit your activities  Do not drive and operate hazardous machinery  Do not make important personal or business decisions  Do  not drink alcoholic beverages  If you have not urinated within 8 hours after discharge, please contact your surgeon on call. Report the following to your surgeon:  Excessive pain, swelling, redness or odor of or around the surgical area  Temperature over 100.5  Nausea and vomiting lasting longer than 4 hours or if unable to take medications  Any signs of decreased circulation or nerve impairment to extremity: change in color, persistent  numbness, tingling, coldness or increase pain  Any questions    What to do at Home:    *  Please give a list of your current medications to your Primary Care Provider. *  Please update this list whenever your medications are discontinued, doses are      changed, or new medications (including over-the-counter products) are added.     *  Please carry medication information at all

## 2022-09-27 NOTE — ANESTHESIA POSTPROCEDURE EVALUATION
Department of Anesthesiology  Postprocedure Note    Patient: Jaime Lemus  MRN: [id number]  YOB: 1950  Date of evaluation: 9/27/2022      Procedure Summary     Date: 09/27/22 Room / Location: [facility] MAIN OR  / [facility] MAIN OR    Anesthesia Start: 1106 Anesthesia Stop: 1300    Procedure: Transcatheter aortic valve replacement (Chest) Diagnosis:       Aortic valve stenosis, etiology of cardiac valve disease unspecified      (Aortic Stenosis)    Providers: Lv Portillo MD Responsible Provider: Hebert Gomez MD    Anesthesia Type: TIVA ASA Status: 4          Anesthesia Type: TIVA    Kristina Phase I: Kristina Score: 10    Kristina Phase II:        Anesthesia Post Evaluation    Patient location during evaluation: PACU  Patient participation: complete - patient participated  Level of consciousness: awake and alert  Airway patency: patent  Nausea & Vomiting: no nausea and no vomiting  Complications: no  Cardiovascular status: hemodynamically stable  Respiratory status: acceptable, nonlabored ventilation and spontaneous ventilation  Hydration status: euvolemic  Comments: /67   Pulse 60   Temp (!) 96.2 °F (35.7 °C) (Axillary)   Resp 23   Ht 5' 7\" (1.702 m)   Wt 175 lb 12.8 oz (79.7 kg)   SpO2 98%   BMI 27.53 kg/m²     Multimodal analgesia pain management approach

## 2022-09-28 ENCOUNTER — APPOINTMENT (OUTPATIENT)
Dept: NON INVASIVE DIAGNOSTICS | Age: 72
DRG: 267 | End: 2022-09-28
Attending: INTERNAL MEDICINE
Payer: OTHER GOVERNMENT

## 2022-09-28 VITALS
OXYGEN SATURATION: 94 % | HEIGHT: 67 IN | TEMPERATURE: 98.6 F | DIASTOLIC BLOOD PRESSURE: 83 MMHG | WEIGHT: 169.75 LBS | HEART RATE: 93 BPM | SYSTOLIC BLOOD PRESSURE: 149 MMHG | RESPIRATION RATE: 21 BRPM | BODY MASS INDEX: 26.64 KG/M2

## 2022-09-28 LAB
ANION GAP SERPL CALC-SCNC: 1 MMOL/L (ref 4–13)
BACTERIA SPEC CULT: NORMAL
BUN SERPL-MCNC: 11 MG/DL (ref 8–23)
CALCIUM SERPL-MCNC: 8.3 MG/DL (ref 8.3–10.4)
CHLORIDE SERPL-SCNC: 109 MMOL/L (ref 101–110)
CO2 SERPL-SCNC: 27 MMOL/L (ref 21–32)
CREAT SERPL-MCNC: 0.8 MG/DL (ref 0.8–1.5)
ECHO AO ASC DIAM: 3.4 CM
ECHO AO ASCENDING AORTA INDEX: 1.8 CM/M2
ECHO AO ROOT DIAM: 3.6 CM
ECHO AO ROOT INDEX: 1.9 CM/M2
ECHO AV ACCELERATION TIME: 94 MS
ECHO AV AREA PEAK VELOCITY: 1.8 CM2
ECHO AV AREA VTI: 1.8 CM2
ECHO AV AREA/BSA PEAK VELOCITY: 1 CM2/M2
ECHO AV AREA/BSA VTI: 1 CM2/M2
ECHO AV MEAN GRADIENT: 13 MMHG
ECHO AV MEAN VELOCITY: 1.6 M/S
ECHO AV PEAK GRADIENT: 23 MMHG
ECHO AV PEAK VELOCITY: 2.4 M/S
ECHO AV VELOCITY RATIO: 0.46
ECHO AV VTI: 48.2 CM
ECHO BSA: 1.94 M2
ECHO EST RA PRESSURE: 8 MMHG
ECHO IVC PROX: 2.6 CM
ECHO LA AREA 2C: 20.6 CM2
ECHO LA AREA 4C: 25.3 CM2
ECHO LA DIAMETER INDEX: 2.59 CM/M2
ECHO LA DIAMETER: 4.9 CM
ECHO LA MAJOR AXIS: 6.4 CM
ECHO LA MINOR AXIS: 5.8 CM
ECHO LA TO AORTIC ROOT RATIO: 1.36
ECHO LA VOL BP: 73 ML (ref 18–58)
ECHO LA VOL/BSA BIPLANE: 39 ML/M2 (ref 16–34)
ECHO LV E' LATERAL VELOCITY: 7 CM/S
ECHO LV E' SEPTAL VELOCITY: 5 CM/S
ECHO LV EDV 3D: 218 ML
ECHO LV EDV INDEX 3D: 115 ML/M2
ECHO LV EJECTION FRACTION 3D: 50 %
ECHO LV ESV 3D: 109 ML
ECHO LV ESV INDEX 3D: 58 ML/M2
ECHO LV FRACTIONAL SHORTENING: 31 % (ref 28–44)
ECHO LV INTERNAL DIMENSION DIASTOLE INDEX: 3.12 CM/M2
ECHO LV INTERNAL DIMENSION DIASTOLIC: 5.9 CM (ref 4.2–5.9)
ECHO LV INTERNAL DIMENSION SYSTOLIC INDEX: 2.17 CM/M2
ECHO LV INTERNAL DIMENSION SYSTOLIC: 4.1 CM
ECHO LV IVSD: 1.2 CM (ref 0.6–1)
ECHO LV MASS 2D: 305.5 G (ref 88–224)
ECHO LV MASS 3D INDEX: 110.6 G/M2
ECHO LV MASS 3D: 209 G
ECHO LV MASS INDEX 2D: 161.6 G/M2 (ref 49–115)
ECHO LV POSTERIOR WALL DIASTOLIC: 1.2 CM (ref 0.6–1)
ECHO LV RELATIVE WALL THICKNESS RATIO: 0.41
ECHO LVOT AREA: 4.2 CM2
ECHO LVOT AV VTI INDEX: 0.44
ECHO LVOT DIAM: 2.3 CM
ECHO LVOT MEAN GRADIENT: 3 MMHG
ECHO LVOT PEAK GRADIENT: 4 MMHG
ECHO LVOT PEAK VELOCITY: 1.1 M/S
ECHO LVOT STROKE VOLUME INDEX: 47 ML/M2
ECHO LVOT SV: 88.9 ML
ECHO LVOT VTI: 21.4 CM
ECHO MV A VELOCITY: 0.91 M/S
ECHO MV E DECELERATION TIME (DT): 262 MS
ECHO MV E VELOCITY: 0.82 M/S
ECHO MV E/A RATIO: 0.9
ECHO MV E/E' LATERAL: 11.71
ECHO MV E/E' RATIO (AVERAGED): 14.06
ECHO MV E/E' SEPTAL: 16.4
ECHO MV EROA PISA: 7.5 CM2
ECHO MV REGURGITANT ALIASING (NYQUIST) VELOCITY: 39 CM/S
ECHO MV REGURGITANT PEAK GRADIENT: 108 MMHG
ECHO MV REGURGITANT PEAK VELOCITY: 5.2 M/S
ECHO MV REGURGITANT RADIUS PISA: 0.4 CM
ECHO MV REGURGITANT VOLUME PISA: 1130.4 ML
ECHO MV REGURGITANT VTIA: 150 CM
ECHO RIGHT VENTRICULAR SYSTOLIC PRESSURE (RVSP): 33 MMHG
ECHO RV BASAL DIMENSION: 3.7 CM
ECHO RV INTERNAL DIMENSION: 3.5 CM
ECHO RV TAPSE: 2.6 CM (ref 1.7–?)
ECHO TV REGURGITANT MAX VELOCITY: 2.5 M/S
ECHO TV REGURGITANT PEAK GRADIENT: 25 MMHG
EKG ATRIAL RATE: 82 BPM
EKG DIAGNOSIS: NORMAL
EKG P AXIS: 55 DEGREES
EKG P-R INTERVAL: 170 MS
EKG Q-T INTERVAL: 412 MS
EKG QRS DURATION: 122 MS
EKG QTC CALCULATION (BAZETT): 481 MS
EKG R AXIS: -34 DEGREES
EKG T AXIS: 150 DEGREES
EKG VENTRICULAR RATE: 82 BPM
ERYTHROCYTE [DISTWIDTH] IN BLOOD BY AUTOMATED COUNT: 12.8 % (ref 11.9–14.6)
GLUCOSE SERPL-MCNC: 89 MG/DL (ref 65–100)
HCT VFR BLD AUTO: 34.4 % (ref 41.1–50.3)
HGB BLD-MCNC: 11.4 G/DL (ref 13.6–17.2)
MCH RBC QN AUTO: 32.7 PG (ref 26.1–32.9)
MCHC RBC AUTO-ENTMCNC: 33.1 G/DL (ref 31.4–35)
MCV RBC AUTO: 98.6 FL (ref 79.6–97.8)
NRBC # BLD: 0 K/UL (ref 0–0.2)
PLATELET # BLD AUTO: 224 K/UL (ref 150–450)
PMV BLD AUTO: 9.2 FL (ref 9.4–12.3)
POTASSIUM SERPL-SCNC: 3.7 MMOL/L (ref 3.5–5.1)
RBC # BLD AUTO: 3.49 M/UL (ref 4.23–5.6)
SERVICE CMNT-IMP: NORMAL
SODIUM SERPL-SCNC: 137 MMOL/L (ref 136–145)
WBC # BLD AUTO: 6.3 K/UL (ref 4.3–11.1)

## 2022-09-28 PROCEDURE — 2580000003 HC RX 258: Performed by: INTERNAL MEDICINE

## 2022-09-28 PROCEDURE — 93321 DOPPLER ECHO F-UP/LMTD STD: CPT | Performed by: INTERNAL MEDICINE

## 2022-09-28 PROCEDURE — 6370000000 HC RX 637 (ALT 250 FOR IP): Performed by: PHYSICIAN ASSISTANT

## 2022-09-28 PROCEDURE — 6360000002 HC RX W HCPCS: Performed by: INTERNAL MEDICINE

## 2022-09-28 PROCEDURE — 76376 3D RENDER W/INTRP POSTPROCES: CPT

## 2022-09-28 PROCEDURE — 76376 3D RENDER W/INTRP POSTPROCES: CPT | Performed by: INTERNAL MEDICINE

## 2022-09-28 PROCEDURE — 6370000000 HC RX 637 (ALT 250 FOR IP): Performed by: INTERNAL MEDICINE

## 2022-09-28 PROCEDURE — 2500000003 HC RX 250 WO HCPCS: Performed by: INTERNAL MEDICINE

## 2022-09-28 PROCEDURE — 80048 BASIC METABOLIC PNL TOTAL CA: CPT

## 2022-09-28 PROCEDURE — 85027 COMPLETE CBC AUTOMATED: CPT

## 2022-09-28 PROCEDURE — 93306 TTE W/DOPPLER COMPLETE: CPT | Performed by: INTERNAL MEDICINE

## 2022-09-28 PROCEDURE — 93308 TTE F-UP OR LMTD: CPT | Performed by: INTERNAL MEDICINE

## 2022-09-28 PROCEDURE — 99232 SBSQ HOSP IP/OBS MODERATE 35: CPT | Performed by: INTERNAL MEDICINE

## 2022-09-28 PROCEDURE — 93325 DOPPLER ECHO COLOR FLOW MAPG: CPT | Performed by: INTERNAL MEDICINE

## 2022-09-28 RX ORDER — ASPIRIN 81 MG/1
81 TABLET, CHEWABLE ORAL DAILY
Qty: 30 TABLET | Refills: 11 | COMMUNITY
Start: 2022-09-29

## 2022-09-28 RX ORDER — CLOPIDOGREL BISULFATE 75 MG/1
75 TABLET ORAL DAILY
Qty: 30 TABLET | Refills: 11 | Status: SHIPPED | OUTPATIENT
Start: 2022-09-29

## 2022-09-28 RX ORDER — HYDROCODONE BITARTRATE AND ACETAMINOPHEN 10; 325 MG/1; MG/1
1 TABLET ORAL EVERY 6 HOURS PRN
Status: DISCONTINUED | OUTPATIENT
Start: 2022-09-28 | End: 2022-09-28 | Stop reason: HOSPADM

## 2022-09-28 RX ORDER — MORPHINE SULFATE 2 MG/ML
2 INJECTION, SOLUTION INTRAMUSCULAR; INTRAVENOUS EVERY 4 HOURS PRN
Status: DISCONTINUED | OUTPATIENT
Start: 2022-09-28 | End: 2022-09-28 | Stop reason: HOSPADM

## 2022-09-28 RX ADMIN — Medication 1 AMPULE: at 10:09

## 2022-09-28 RX ADMIN — SODIUM CHLORIDE, PRESERVATIVE FREE 10 ML: 5 INJECTION INTRAVENOUS at 11:00

## 2022-09-28 RX ADMIN — ASPIRIN 81 MG: 81 TABLET, CHEWABLE ORAL at 09:57

## 2022-09-28 RX ADMIN — LOSARTAN POTASSIUM 50 MG: 50 TABLET, FILM COATED ORAL at 09:58

## 2022-09-28 RX ADMIN — FINASTERIDE 5 MG: 5 TABLET, FILM COATED ORAL at 09:59

## 2022-09-28 RX ADMIN — CLOPIDOGREL BISULFATE 75 MG: 75 TABLET ORAL at 09:58

## 2022-09-28 RX ADMIN — ACETAMINOPHEN 650 MG: 325 TABLET, FILM COATED ORAL at 00:06

## 2022-09-28 RX ADMIN — VENLAFAXINE HYDROCHLORIDE 150 MG: 150 CAPSULE, EXTENDED RELEASE ORAL at 10:02

## 2022-09-28 RX ADMIN — OXCARBAZEPINE 300 MG: 300 TABLET, FILM COATED ORAL at 10:02

## 2022-09-28 RX ADMIN — TAMSULOSIN HYDROCHLORIDE 0.4 MG: 0.4 CAPSULE ORAL at 09:59

## 2022-09-28 RX ADMIN — AMLODIPINE BESYLATE 10 MG: 10 TABLET ORAL at 12:47

## 2022-09-28 RX ADMIN — CEFAZOLIN 2000 MG: 10 INJECTION, POWDER, FOR SOLUTION INTRAVENOUS at 02:55

## 2022-09-28 ASSESSMENT — PAIN SCALES - GENERAL
PAINLEVEL_OUTOF10: 0

## 2022-09-28 NOTE — DISCHARGE SUMMARY
[facility] Discharge Summary     Patient ID:  Ashley Romano  [id number]  1 y.o.  1950    Admit date: 9/27/2022    Discharge date: 09/28/22     Admitting Physician: Dara Keller MD     Discharge Physician: CHEN Chavez CNP/Dr. Oconnell    Admission Diagnoses: Aortic valve stenosis, etiology of cardiac valve disease unspecified [I35.0]  Aortic stenosis, severe [I35.0]    Discharge Diagnoses:   Patient Active Problem List    Diagnosis Date Noted    Aortic stenosis, severe 09/27/2022    Aortic valve stenosis, etiology of cardiac valve disease unspecified 09/27/2022    CONTRERAS (dyspnea on exertion) 08/26/2022    Severe aortic stenosis by prior echocardiogram 08/26/2022    Hypertension 08/26/2022    Accelerating angina ([address] 08/26/2022    Left lower quadrant abdominal pain 10/07/2021    Wound, open, abdominal wall, anterior 12/14/2011       Cardiology Procedures this admission:  Transcatheter aortic valve replacement and echocardiogram     Consults: none    Hospital Course: Patient was seen at the office of [facility] by Dr. Ramón Quinones in follow up for severe aortic stenosis. The patient has type I bicuspid valve. He has completed TAVR work-up. He appears appropriate for both SAVR and TAVR. Patient has chosen to pursue TAVR. He was admitted for planned TAVR. The patient underwent successful balloon valvuloplasty and transcatheter aortic valve replacement with a 29 mm Graff Eliane 3 valve. The patient was monitored closely in the CVICU. The patient had transient heart block shortly after the procedure but overnight had no use of his transvenous PM and it was removed. The afternoon of 9/28/2022,  the patient was up feeling well without any complaints of chest pain or shortness of breath. Patient's labs were stable. Patient's bilateral femoral sites without any bleeding, bruising or hematoma.        An echocardiogram showed 09/27/22    TRANSTHORACIC ECHOCARDIOGRAM (TTE) COMPLETE (CONTRAST/BUBBLE/3D PRN) 09/28/2022 12:17 PM (Final)    Interpretation Summary    Left Ventricle: Normal left ventricular systolic function with a visually estimated EF of 60 - 65%. Left ventricle size is normal. Mildly increased wall thickness. Normal wall motion. Abnormal diastolic function. Aortic Valve: Appropriately positioned transcatheter bioprosthetic valve that is well-seated. Mitral Valve: Mild to moderate regurgitation. Left Atrium: Left atrium is moderately dilated. Aorta: Mildly dilated ascending aorta. Technical qualifiers: Color flow Doppler was performed and pulse wave and/or continuous wave Doppler was performed. Signed by: Jude Wayne MD on 9/28/2022 12:17 PM       The afternoon of 9/28/2022, the patient was seen and examined by Dr. Maya Morales and determined stable and ready for discharge. Patient was instructed on the importance of medication compliance including dual anti-platelet therapy for at least 6 months. The patient will have transitional care follow up with [facility] Dr. Jose Manuel Jiménez on 10/3/2022 at 345 pm in [place] office. .   A repeat echocardiogram s/p TAVR is scheduled for 10/31/2022 at 230 pm  and CBC/BMP will be done prior to that appt. The patient will see Dr. Jose Manuel Jiménez  for 30 day follow up appt on 10/31/2022 at 345 pm in [place] office. The patient was referred to cardiac rehab as well. DISPOSITION: The patient is being discharged home in stable condition on a low saturated fat, low cholesterol and low salt diet. The patient is instructed to advance activities as tolerated to the limit of fatigue or shortness of breath. The patient is instructed to avoid lifting anything heavier than 10 lbs for 2 weeks. The patient is instructed to avoid any straining, stooping or squatting for 2 weeks. The patient is instructed not to drive for 1 week.  The patient is instructed to watch the groin site for bleeding/oozing; if seen, the patient is instructed to apply firm pressure with a clean cloth and call [facility] at [phone]. The patient is instructed to watch for signs of infection which include: increasing area of redness, fever/hot to touch or purulent drainage at the groin site. The patient is instructed not to soak in a bathtub for 7-10 days, but is cleared to shower. The patient is instructed to return to the ER immediately for any severe pain, color change, or temperature change in leg. The patient was informed that prophylaxis for bacterial endocarditis is recommended for high-risk procedures such as all dental procedures that involve manipulation of gingival tissue, the periapical region of teeth, or perforation of the oral mucosa. Discharge Exam: BP (!) 154/80   Pulse 88   Temp 98.6 °F (37 °C) (Oral)   Resp 30   Ht 5' 7\" (1.702 m)   Wt 169 lb 12.1 oz (77 kg)   SpO2 94%   BMI 26.59 kg/m²   Patient has been seen by Dr. Bradford Reason: see his progress note for exam details.     Recent Results (from the past 24 hour(s))   Basic Metabolic Panel w/ Reflex to MG    Collection Time: 09/28/22  3:06 AM   Result Value Ref Range    Sodium 137 136 - 145 mmol/L    Potassium 3.7 3.5 - 5.1 mmol/L    Chloride 109 101 - 110 mmol/L    CO2 27 21 - 32 mmol/L    Anion Gap 1 (L) 4 - 13 mmol/L    Glucose 89 65 - 100 mg/dL    BUN 11 8 - 23 MG/DL    Creatinine 0.80 0.8 - 1.5 MG/DL    GFR African American >60 >60 ml/min/1.73m2    GFR Non- >60 >60 ml/min/1.73m2    Calcium 8.3 8.3 - 10.4 MG/DL   CBC    Collection Time: 09/28/22  3:06 AM   Result Value Ref Range    WBC 6.3 4.3 - 11.1 K/uL    RBC 3.49 (L) 4.23 - 5.6 M/uL    Hemoglobin 11.4 (L) 13.6 - 17.2 g/dL    Hematocrit 34.4 (L) 41.1 - 50.3 %    MCV 98.6 (H) 79.6 - 97.8 FL    MCH 32.7 26.1 - 32.9 PG    MCHC 33.1 31.4 - 35.0 g/dL    RDW 12.8 11.9 - 14.6 %    Platelets 447 392 - 283 K/uL    MPV 9.2 (L) 9.4 - 12.3 FL    nRBC 0.00 0.0 - 0.2 K/uL   EKG 12 lead 21.4 cm    LVOT Peak Velocity 1.1 m/s    LVOT Peak Gradient 4 mmHg    LVPWd 1.2 (A) 0.6 - 1.0 cm    LV E' Lateral Velocity 7 cm/s    LV E' Septal Velocity 5 cm/s    LVOT Area 4.2 cm2    IVC Proxmal 2.6 cm    MV Nyquist Velocity 39 cm/s    MR Radius PISA 0.40 cm    MR .0 cm    MR Peak Velocity 5.2 m/s    MR Peak Gradient 108 mmHg    MV E Wave Deceleration Time 262.0 ms    MV A Velocity 0.91 m/s    MV E Velocity 0.82 m/s    MV EROA PISA 7.5 cm2    RVIDd 3.5 cm    RV Basal Dimension 3.7 cm    TAPSE 2.6 1.7 cm    TR Max Velocity 2.50 m/s    TR Peak Gradient 25 mmHg         Patient Instructions:     Current Discharge Medication List        START taking these medications    Details   aspirin 81 MG chewable tablet Take 1 tablet by mouth daily  Qty: 30 tablet, Refills: 11      clopidogrel (PLAVIX) 75 MG tablet Take 1 tablet by mouth daily  Qty: 30 tablet, Refills: 11           CONTINUE these medications which have NOT CHANGED    Details   venlafaxine (EFFEXOR XR) 150 MG extended release capsule Take 150 mg by mouth daily      finasteride (PROSCAR) 5 MG tablet Take 5 mg by mouth daily      donepezil (ARICEPT) 10 MG tablet Take 5 mg by mouth nightly      OXcarbazepine (TRILEPTAL) 300 MG tablet Take 300 mg by mouth 2 times daily      amLODIPine (NORVASC) 10 MG tablet Take 10 mg by mouth daily      losartan (COZAAR) 50 MG tablet Take 50 mg by mouth daily      tamsulosin (FLOMAX) 0.4 MG capsule Take 0.4 mg by mouth daily      Zinc Acetate, Oral, (ZINC ACETATE PO) Take by mouth daily      Multiple Vitamin (MULTI-DAY PO) Take by mouth daily      FOLIC ACID PO Take by mouth daily      Ascorbic Acid (VITAMIN C PO) Take by mouth daily           STOP taking these medications       naproxen (NAPROSYN) 375 MG tablet Comments:   Reason for Stopping:                Signed:  CHEN Malone - CNP-C  9/28/2022  3:11 PM

## 2022-09-28 NOTE — PROGRESS NOTES
Discharge instructions reviewed and signed, placed in chart. Trileptal dose adjusted on discharge to BID and will call PCP regarding the medications in question. Stable for discharge.

## 2022-09-28 NOTE — PROGRESS NOTES
am  9/28/2022 6:36 AM    Admit Date: 9/27/2022    Admit Diagnosis: Aortic valve stenosis, etiology of cardiac valve disease unspecified [I35.0]  Aortic stenosis, severe [I35.0]      Subjective:    Patient : Patient status post TAVR he had very transient issues with heart block apparently shortly after the procedure but overnight had no use of his transvenous balloontipped temp pacemaker is hemodynamically stable and we should be able to remove his temp pacer today as well as a venous sheath watch him for 24 more hours and anticipate discharge on Thursday    Objective:    BP (!) 149/72   Pulse 78   Temp 98.7 °F (37.1 °C) (Oral)   Resp 13   Ht 5' 7\" (1.702 m)   Wt 169 lb 12.1 oz (77 kg)   SpO2 96%   BMI 26.59 kg/m²     ROS:  General ROS: negative for - chills  Hematological and Lymphatic ROS: negative for - blood clots or jaundice  Respiratory ROS: no cough, shortness of breath, or wheezing  Cardiovascular ROS: no chest pain or dyspnea on exertion  Gastrointestinal ROS: no abdominal pain, change in bowel habits, or black or bloody stools  Neurological ROS: no TIA or stroke symptoms    Physical Exam:      Physical Examination: General appearance - Appearance: alert, well appearing, and in no distress.    Neck/lymph - supple, no significant adenopathy  Chest/CV - clear to auscultation, no wheezes, rales or rhonchi, symmetric air entry  Heart - normal rate, regular rhythm, normal S1, S2, no murmurs, rubs, clicks or gallops  Abdomen/GI - soft, nontender, nondistended, no masses or organomegaly   Musculoskeletal - no joint tenderness, deformity or swelling  Extremities - peripheral pulses normal, no pedal edema, no clubbing or cyanosis  Skin - normal coloration and turgor, no rashes, no suspicious skin lesions noted    Current Facility-Administered Medications   Medication Dose Route Frequency    HYDROcodone-acetaminophen (NORCO)  MG per tablet 1 tablet  1 tablet Oral Q6H PRN    morphine injection 2 mg  2 mg IntraVENous Q4H PRN    amLODIPine (NORVASC) tablet 10 mg  10 mg Oral Daily    donepezil (ARICEPT) tablet 5 mg  5 mg Oral Nightly    finasteride (PROSCAR) tablet 5 mg  5 mg Oral Daily    losartan (COZAAR) tablet 50 mg  50 mg Oral Daily    OXcarbazepine (TRILEPTAL) tablet 300 mg  300 mg Oral BID    tamsulosin (FLOMAX) capsule 0.4 mg  0.4 mg Oral Daily    venlafaxine (EFFEXOR XR) extended release capsule 150 mg  150 mg Oral Daily    ondansetron (ZOFRAN) injection 4 mg  4 mg IntraVENous Q6H PRN    acetaminophen (TYLENOL) tablet 650 mg  650 mg Oral Q6H PRN    polyethylene glycol (GLYCOLAX) packet 17 g  17 g Oral Daily PRN    nitroGLYCERIN (NITROSTAT) SL tablet 0.4 mg  0.4 mg SubLINGual Q5 Min PRN    alcohol 62% (NOZIN) nasal  1 ampule  1 ampule Topical Q12H    sodium chloride flush 0.9 % injection 5-40 mL  5-40 mL IntraVENous 2 times per day    sodium chloride flush 0.9 % injection 5-40 mL  5-40 mL IntraVENous PRN    0.9 % sodium chloride infusion   IntraVENous PRN    aluminum & magnesium hydroxide-simethicone (MAALOX) 200-200-20 MG/5ML suspension 15 mL  15 mL Oral Q6H PRN    aspirin chewable tablet 81 mg  81 mg Oral Daily    clopidogrel (PLAVIX) tablet 75 mg  75 mg Oral Daily       Data Review: data included in this note has been independently reviewed by the author     TELEMETRY: Normal sinus rhythm    Assessment/Plan:     Patient Active Problem List   Diagnosis    Wound, open, abdominal wall, anterior    Left lower quadrant abdominal pain    CONTRERAS (dyspnea on exertion)    Severe aortic stenosis by prior echocardiogram    Hypertension    Accelerating angina (HCC)    Aortic stenosis, severe    Aortic valve stenosis, etiology of cardiac valve disease unspecified     PLAN  TAVR  Status post new implant TAVR patient appears to be progressing well we will plan for removal of temp pacer today and venous sheath with ultimate transfer ideally to telemetry floor and plan for disposition tomorrow graft hypertension  Patient's blood pressure is running mildly elevated we will continue to monitor and adjust medicines as needed        Jannet Hinton MD

## 2022-09-29 ENCOUNTER — TELEPHONE (OUTPATIENT)
Dept: CARDIOLOGY CLINIC | Age: 72
End: 2022-09-29

## 2022-09-29 ENCOUNTER — CARE COORDINATION (OUTPATIENT)
Dept: OTHER | Facility: CLINIC | Age: 72
End: 2022-09-29

## 2022-09-29 LAB
ABO + RH BLD: NORMAL
BLD PROD TYP BPU: NORMAL
BLOOD BANK DISPENSE STATUS: NORMAL
BLOOD GROUP ANTIBODIES SERPL: NORMAL
BPU ID: NORMAL
CROSSMATCH RESULT: NORMAL
SPECIMEN EXP DATE BLD: NORMAL
UNIT DIVISION: 0

## 2022-09-29 NOTE — CARE COORDINATION
[facility] Care Transitions Initial Follow Up Call    Call within 2 business days of discharge: Yes  CTN attempted to outreach per hospital discharge on 2022 for HEBERT RAMOS call. Unable to reach patient. HIPPA compliant message left with contact information requesting a return call. Will continue to outreach per protocol. Patient: Juan Mckinneys Patient : 1950   MRN: [id number]  Reason for Admission: Aortic stenosis, severe  Discharge Date: 22 RARS: Readmission Risk Score: 11.9      Last Discharge  [facility]       Date Complaint Diagnosis Description Type Department Provider    22  Aortic stenosis, severe . .. Admission (Discharged) Alexia Verde MD          Was this an external facility discharge?  No Discharge Facility: [facility]    Follow Up  Future Appointments   Date Time Provider Shorty Vora   10/3/2022  3:45 PM Jeffry Bravo MD [facility] AMB   10/31/2022  2:30 PM [facility] ECHO [facility] GVL AMB   10/31/2022  3:45 PM MD TONYA Huddleston G[facility] AMB   2023  9:00 AM Mohsen Morgan MD [facility] AMB     Meme Valles RN

## 2022-09-29 NOTE — TELEPHONE ENCOUNTER
Transitional Care fu after DC from [facility] 9/28/22 S/P TAVR. I spoke w/pt. he is doing well,has all his meds,site looks good w/no signs of infection. I went over DC summary including diet,importance of med compliance,activity,s/s of infection,need for dental prophylaxis,dual anti-platelet therapy and precautions as well as fu appt. 10/3 w/. All questions answered and pt.advised to call PRN and v/u.

## 2022-09-30 ENCOUNTER — CARE COORDINATION (OUTPATIENT)
Dept: OTHER | Facility: CLINIC | Age: 72
End: 2022-09-30

## 2022-09-30 NOTE — CARE COORDINATION
[address] Care Transitions Initial Follow Up Call    Call within 2 business days of discharge: Yes    Care Transition Nurse contacted the patient by telephone to perform post hospital discharge assessment. Verified name and  with family as identifiers. Provided introduction to self, and explanation of the Care Transition Nurse role. Patient: Steffanie Alexis Patient : 1950   MRN: [id number]  Reason for Admission: Aortic stenosis, severe, S/P TAVR  Discharge Date: 22 RARS: Readmission Risk Score: 11.9      Last Discharge [address]       Date Complaint Diagnosis Description Type Department Provider    22  Aortic stenosis, severe . .. Admission (Discharged) Brandy Rincon MD            Was this an external facility discharge? No Discharge Facility: [facility]    Challenges to be reviewed by the provider   Additional needs identified to be addressed with provider: No  none               Method of communication with provider: none. Care Transition Nurse reviewed discharge instructions, medical action plan, and red flags with family who verbalized understanding. The family was given an opportunity to ask questions and does not have any further questions or concerns at this time. Were discharge instructions available to patient? Yes. Reviewed appropriate site of care based on symptoms and resources available to patient including: PCP  Specialist  When to call [address]  Condition related references. The family agrees to contact the PCP office for questions related to their healthcare. Advance Care Planning:   Does patient have an Advance Directive: not on file. Medication reconciliation was performed with family, who verbalizes understanding of administration of home medications.  Medications reviewed, 1111F entered: yes    Was patient discharged with a pulse oximeter? no    Non-face-to-face services provided:  Scheduled appointment with PCP-with the [facility] on 10/13/22, labs on 10/6/22 per spouse  Scheduled appointment with Specialist-Cardiology on 10/3/22  Obtained and reviewed discharge summary and/or continuity of care documents  Education of patient/family/caregiver/guardian to support self-management-post op care, use of incentive spirometer, medical conditions. Assessment and support for treatment adherence and medication management-per discharge instructions. Patient has good support from spouse. Family is aware of upcoming follow up. Offered patient enrollment in the Remote Patient Monitoring (RPM) program for in-home monitoring: NA.    Care Transitions 24 Hour Call    Schedule Follow Up Appointment with PCP: Completed  Do you have a copy of your discharge instructions?: Yes  Have you been contacted by a [address]?: No  Have you scheduled your follow up appointment?: Yes  How are you going to get to your appointment?: Car - family or friend to transport  Do you have support at home?: Partner/Spouse/SO  Do you feel like you have everything you need to keep you well at home?: Yes  Are you an active caregiver in your home?: No  Care Transitions Interventions        Goals Addressed                   This Visit's Progress     Conditions and Symptoms        I will schedule office visits, as directed by my provider. I will keep my appointment or reschedule if I have to cancel. I will follow my Zone Management tool to seek urgent or emergent care. I will notify my provider of any symptoms that indicate a worsening of my condition.     Barriers: none  Plan for overcoming my barriers: N/A  Confidence: 9/10  Anticipated Goal Completion Date: 10/29/22                 Follow Up  Future Appointments   Date Time Provider Shorty Vora   10/3/2022  3:45 PM MD TONYA Rushing AMB   10/31/2022  2:30 PM [facility] ECHO 36 TONYA CAMARA AMB   10/31/2022  3:45 PM MD TONYA Rushing AMB   2/2/2023  9:00 AM MD TONYA Pierce AMB Care Transition Nurse provided contact information. Plan for follow-up call in 5-7 days based on severity of symptoms and risk factors. Plan for next call:  family/self management of post op care, medical conditions, medications, and follow up appointments.      Addy Hansen RN

## 2022-10-03 ENCOUNTER — OFFICE VISIT (OUTPATIENT)
Dept: CARDIOLOGY CLINIC | Age: 72
End: 2022-10-03

## 2022-10-03 VITALS
SYSTOLIC BLOOD PRESSURE: 130 MMHG | HEIGHT: 67 IN | WEIGHT: 174.6 LBS | HEART RATE: 82 BPM | BODY MASS INDEX: 27.4 KG/M2 | DIASTOLIC BLOOD PRESSURE: 60 MMHG

## 2022-10-03 DIAGNOSIS — I10 PRIMARY HYPERTENSION: Primary | ICD-10-CM

## 2022-10-03 DIAGNOSIS — Z95.2 S/P TAVR (TRANSCATHETER AORTIC VALVE REPLACEMENT): Chronic | ICD-10-CM

## 2022-10-03 PROBLEM — I35.0 SEVERE AORTIC STENOSIS BY PRIOR ECHOCARDIOGRAM: Status: RESOLVED | Noted: 2022-08-26 | Resolved: 2022-10-03

## 2022-10-03 PROBLEM — I20.0 ACCELERATING ANGINA (HCC): Status: RESOLVED | Noted: 2022-08-26 | Resolved: 2022-10-03

## 2022-10-03 PROBLEM — I35.0 AORTIC VALVE STENOSIS, ETIOLOGY OF CARDIAC VALVE DISEASE UNSPECIFIED: Status: RESOLVED | Noted: 2022-09-27 | Resolved: 2022-10-03

## 2022-10-03 PROBLEM — I35.0 AORTIC STENOSIS, SEVERE: Status: RESOLVED | Noted: 2022-09-27 | Resolved: 2022-10-03

## 2022-10-03 ASSESSMENT — ENCOUNTER SYMPTOMS
COUGH: 0
ABDOMINAL PAIN: 0
SHORTNESS OF BREATH: 1
BACK PAIN: 0

## 2022-10-03 NOTE — PROGRESS NOTES
[facility]  [address]      10/03/22      NAME:  Mihaela Camp  : 1950  MRN: [id number]      SUBJECTIVE:   Mihaela Camp is a 66 y.o. male seen for a follow up visit regarding the following:     Chief Complaint   Patient presents with    Hypertension    Shortness of Breath       HPI:   66 y.o. male with critical aortic stenosis. He has type I bicuspid valve. 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 Patient is here today for TC 7 follow-up. Please see nursing documentation for medication reconciliation discharge diagnoses. Patient is healed up well and is [place] heart association class I/IICHF. Mean gradient 13mmHg at time of DC. Past Medical History, Past Surgical History, Family history, Social History, and Medications were all reviewed with the patient today and updated as necessary. Current Outpatient Medications   Medication Sig Dispense Refill    aspirin 81 MG chewable tablet Take 1 tablet by mouth daily 30 tablet 11    clopidogrel (PLAVIX) 75 MG tablet Take 1 tablet by mouth daily 30 tablet 11    venlafaxine (EFFEXOR XR) 150 MG extended release capsule Take 150 mg by mouth daily      finasteride (PROSCAR) 5 MG tablet Take 5 mg by mouth daily      donepezil (ARICEPT) 10 MG tablet Take 5 mg by mouth nightly      OXcarbazepine (TRILEPTAL) 300 MG tablet Take 300 mg by mouth 2 times daily      amLODIPine (NORVASC) 10 MG tablet Take 10 mg by mouth daily      losartan (COZAAR) 50 MG tablet Take 50 mg by mouth daily      tamsulosin (FLOMAX) 0.4 MG capsule Take 0.4 mg by mouth daily      Zinc Acetate, Oral, (ZINC ACETATE PO) Take by mouth daily      Multiple Vitamin (MULTI-DAY PO) Take by mouth daily      FOLIC ACID PO Take by mouth daily      Ascorbic Acid (VITAMIN C PO) Take by mouth daily       No current facility-administered medications for this visit.      Patient Active Problem List Diagnosis Date Noted    S/P TAVR (transcatheter aortic valve replacement) 10/03/2022     Priority: Medium     09/2022:  29mm Graff earle ultra valve      CONTRERAS (dyspnea on exertion) 08/26/2022     Priority: Medium    Hypertension 08/26/2022     Priority: Medium    Left lower quadrant abdominal pain 10/07/2021    Wound, open, abdominal wall, anterior 12/14/2011      No family history on file. Social History     Tobacco Use    Smoking status: Never    Smokeless tobacco: Never   Substance Use Topics    Alcohol use: No       Review Of Symptoms    Review of Systems   Constitutional: Negative for fever and malaise/fatigue. HENT:  Negative for nosebleeds. Cardiovascular:  Negative for chest pain, dyspnea on exertion and palpitations. Respiratory:  Positive for shortness of breath. Negative for cough. Musculoskeletal:  Negative for back pain, muscle cramps, muscle weakness and myalgias. Gastrointestinal:  Negative for abdominal pain. Neurological:  Negative for dizziness. Physical Exam  Blood pressure 130/60, pulse 82, height 5' 7\" (1.702 m), weight 174 lb 9.6 oz (79.2 kg). Physical Exam  HENT:      Head: Normocephalic and atraumatic. Eyes:      Extraocular Movements: Extraocular movements intact. Cardiovascular:      Rate and Rhythm: Normal rate and regular rhythm. Heart sounds: No murmur heard. Pulmonary:      Effort: Pulmonary effort is normal.      Breath sounds: Normal breath sounds. Abdominal:      Palpations: Abdomen is soft. Musculoskeletal:         General: Normal range of motion. Skin:     General: Skin is warm and dry. Neurological:      General: No focal deficit present. Mental Status: He is oriented to person, place, and time. Medical problems, medical history and test results were reviewed with the patient today.       No results found for: CHOL  No results found for: TRIG  No results found for: HDL  No results found for: LDLCHOLESTEROL, LDLCALC  No results found for: LABVLDL, VLDL  No results found for: [facility]     Lab Results   Component Value Date    LABA1C 5.1 09/26/2022     Lab Results   Component Value Date     09/26/2022        Lab Results   Component Value Date     09/28/2022    K 3.7 09/28/2022     09/28/2022    CO2 27 09/28/2022    BUN 11 09/28/2022    CREATININE 0.80 09/28/2022    GLUCOSE 89 09/28/2022    CALCIUM 8.3 09/28/2022    PROT 6.6 09/26/2022    LABALBU 3.9 09/26/2022    BILITOT 0.4 09/26/2022    ALKPHOS 110 09/26/2022    AST 14 (L) 09/26/2022    ALT 22 09/26/2022    LABGLOM >60 09/28/2022    GFRAA >60 09/28/2022    AGRATIO 1.2 09/01/2020    GLOB 2.7 09/26/2022       Lab Results   Component Value Date/Time     09/28/2022 03:06 AM    K 3.7 09/28/2022 03:06 AM     09/28/2022 03:06 AM    CO2 27 09/28/2022 03:06 AM    BUN 11 09/28/2022 03:06 AM    CREATININE 0.80 09/28/2022 03:06 AM    GLUCOSE 89 09/28/2022 03:06 AM    CALCIUM 8.3 09/28/2022 03:06 AM        Lab Results   Component Value Date    WBC 6.3 09/28/2022    HGB 11.4 (L) 09/28/2022    HCT 34.4 (L) 09/28/2022    MCV 98.6 (H) 09/28/2022     09/28/2022             ASSESSMENT:    Hebert Holbrook was seen today for hypertension. Diagnoses and all orders for this visit:    S/P TAVR -patient status post TAVR 09/2022 with 29 mm Graff VIVI ultra valve for severe bicuspid aortic stenosis. He is stable on aspirin and clopidogrel. Patient is scheduled for 30-day follow-up and echocardiogram.  Encourage patient to engage in cardiac rehab. Hypertension, unspecified type -well-controlled on amlodipine and losartan. Problem List Items Addressed This Visit          Circulatory    S/P TAVR (transcatheter aortic valve replacement) (Chronic)    Hypertension - Primary     There are no discontinued medications.                 Patient has been instructed and agrees to call our office with any issues or other concerns related to their cardiac condition(s) and/or complaint(s).       Return for 30-day TAVR follow-up with echocardiogram.       Carlos Stoll MD  10/3/2022

## 2022-10-05 ENCOUNTER — CARE COORDINATION (OUTPATIENT)
Dept: OTHER | Facility: CLINIC | Age: 72
End: 2022-10-05

## 2022-10-06 NOTE — CARE COORDINATION
[facility] Care Transitions Follow Up Call    Care Transition Nurse contacted the patient by telephone to follow up after admission on 2022. Verified name and  with patient as identifiers. Patient: Jayden Mccormick  Patient : 1950   MRN: [id number]  Reason for Admission: Aortic stenosis, severe, S/P TAVR  Discharge Date: 22 RARS: Readmission Risk Score: 11.9      Needs to be reviewed by the provider   Additional needs identified to be addressed with provider: No  none             Method of communication with provider: none. na    Addressed changes since last contact:   Patient completed follow up with Cardiology. No changes since discharge. Patient followed up with the [place] as scheduled. Family reports patient is doing well at this time and incision is clean and dry and free of redness or swelling. Family endorses compliance with medications. Discussed follow-up appointments. If no appointment was previously scheduled, appointment scheduling offered: Yes. Is follow up appointment scheduled within 7 days of discharge? Yes. Follow Up  Future Appointments   Date Time Provider Shorty Vora   10/10/2022  8:30 AM Drake Connolly RN [facility]   10/31/2022  2:30 PM [facility] ECHO 36 [facility] AMB   10/31/2022  3:45 PM Catalina Cardenas MD [facility] AMB   2023  1:00 PM Walter Ritchie MD [facility] AMB     Non-[facility] follow up appointment(s): no    Care Transition Nurse reviewed medical action plan and red flags with family and discussed any barriers to care and/or understanding of plan of care after discharge. Discussed appropriate site of care based on symptoms and resources available to patient including: PCP  Specialist  When to call [address]  Condition related references  [address] . The family agrees to contact the PCP office for questions related to their healthcare.      Patients top risk factors for readmission: medical condition-Severe Aortic Stenosis S/P TAVR, htn  Interventions to address risk factors: Education of patient/family/caregiver/guardian to support self-management-post op care. Medical conditions and Assessment and support for treatment adherence and medication management-per discharge. Patient given an opportunity to ask questions and does not have any further questions or concerns at this time. The patient is able to contact the PCP/Specialist office for questions related to their healthcare    Offered patient enrollment in the Remote Patient Monitoring (RPM) program for in-home monitoring: NA.     Care Transitions Subsequent and Final Call    Schedule Follow Up Appointment with PCP: Completed  Subsequent and Final Calls  Have your medications changed?: No  Do you have any questions related to your medications?: No  Do you currently have any active services?: No  Do you have any needs or concerns that I can assist you with?: No  Identified Barriers: None  Care Transitions Interventions  Other Interventions:           Care Transition Nurse provided contact information for future needs. Plan for follow-up call in 10-14 days based on severity of symptoms and risk factors. Plan for next call:  self/family management of medical conditions.     Patrice Renteria RN

## 2022-10-10 ENCOUNTER — HOSPITAL ENCOUNTER (OUTPATIENT)
Dept: CARDIAC REHAB | Age: 72
Setting detail: RECURRING SERIES
Discharge: HOME | End: 2022-10-13
Payer: OTHER GOVERNMENT

## 2022-10-10 ASSESSMENT — PATIENT HEALTH QUESTIONNAIRE - PHQ9
8. MOVING OR SPEAKING SO SLOWLY THAT OTHER PEOPLE COULD HAVE NOTICED. OR THE OPPOSITE, BEING SO FIGETY OR RESTLESS THAT YOU HAVE BEEN MOVING AROUND A LOT MORE THAN USUAL: 0
10. IF YOU CHECKED OFF ANY PROBLEMS, HOW DIFFICULT HAVE THESE PROBLEMS MADE IT FOR YOU TO DO YOUR WORK, TAKE CARE OF THINGS AT HOME, OR GET ALONG WITH OTHER PEOPLE: 0
1. LITTLE INTEREST OR PLEASURE IN DOING THINGS: 0
SUM OF ALL RESPONSES TO PHQ QUESTIONS 1-9: 2
SUM OF ALL RESPONSES TO PHQ QUESTIONS 1-9: 2
4. FEELING TIRED OR HAVING LITTLE ENERGY: 0
3. TROUBLE FALLING OR STAYING ASLEEP: 1
SUM OF ALL RESPONSES TO PHQ QUESTIONS 1-9: 2
9. THOUGHTS THAT YOU WOULD BE BETTER OFF DEAD, OR OF HURTING YOURSELF: 0
5. POOR APPETITE OR OVEREATING: 1
SUM OF ALL RESPONSES TO PHQ9 QUESTIONS 1 & 2: 0
6. FEELING BAD ABOUT YOURSELF - OR THAT YOU ARE A FAILURE OR HAVE LET YOURSELF OR YOUR FAMILY DOWN: 0
7. TROUBLE CONCENTRATING ON THINGS, SUCH AS READING THE NEWSPAPER OR WATCHING TELEVISION: 0
2. FEELING DOWN, DEPRESSED OR HOPELESS: 0
SUM OF ALL RESPONSES TO PHQ QUESTIONS 1-9: 2

## 2022-10-10 ASSESSMENT — LIFESTYLE VARIABLES: SMOKELESS_TOBACCO: NO

## 2022-10-10 ASSESSMENT — EJECTION FRACTION: EF_VALUE: 60

## 2022-10-10 NOTE — CARDIO/PULMONARY
Dear Dr. Esvin Fallon: Thank you for referring your patient, Danna Bryan (: 1950), to the Cardiopulmonary Rehabilitation Program at [facility].  Mr. Alta Munoz is a good candidate for the Cardiac Rehab Program and should see improvements with regular participation. We will be addressing appropriate interventions for modifiable risk factors with your patient during the next 12 weeks. We will contact you with any issues or concerns that may arise, or you can follow your patients progress through [address] [place] [address] at any time. A final summary will be available on [facility] when the program is completed. Again, thank you for your referral. If we can be of further assistance, please feel free to contact the Cardiopulmonary Rehab staff at [phone].     Sincerely,    SYEDA Yates, RN  Cardiopulmonary Rehabilitation Nurse  [facility]

## 2022-10-11 ENCOUNTER — HOSPITAL ENCOUNTER (OUTPATIENT)
Dept: CARDIAC REHAB | Age: 72
Setting detail: RECURRING SERIES
Discharge: HOME | End: 2022-10-14
Payer: OTHER GOVERNMENT

## 2022-10-11 VITALS — HEART RATE: 73 BPM | BODY MASS INDEX: 26.71 KG/M2 | WEIGHT: 170.2 LBS | HEIGHT: 67 IN | OXYGEN SATURATION: 99 %

## 2022-10-11 PROCEDURE — 93798 PHYS/QHP OP CAR RHAB W/ECG: CPT

## 2022-10-11 ASSESSMENT — EXERCISE STRESS TEST
PEAK_BP: 144/70
PEAK_RPE: 10
PEAK_HR: 98
PEAK_HR: 97
PEAK_BP: 144/70
PEAK_RPE: 10
PEAK_METS: 2.3
PEAK_RPD: 0
PEAK_BP: 144/70

## 2022-10-11 ASSESSMENT — EJECTION FRACTION: EF_VALUE: 60

## 2022-10-11 ASSESSMENT — LIFESTYLE VARIABLES: SMOKELESS_TOBACCO: NO

## 2022-10-14 ENCOUNTER — HOSPITAL ENCOUNTER (OUTPATIENT)
Dept: CARDIAC REHAB | Age: 72
Setting detail: RECURRING SERIES
Discharge: HOME | End: 2022-10-17
Payer: OTHER GOVERNMENT

## 2022-10-14 PROCEDURE — 93798 PHYS/QHP OP CAR RHAB W/ECG: CPT

## 2022-10-14 ASSESSMENT — EXERCISE STRESS TEST
PEAK_METS: 2.3
PEAK_HR: 111

## 2022-10-17 ENCOUNTER — HOSPITAL ENCOUNTER (OUTPATIENT)
Dept: CARDIAC REHAB | Age: 72
Setting detail: RECURRING SERIES
Discharge: HOME | End: 2022-10-20
Payer: OTHER GOVERNMENT

## 2022-10-17 PROCEDURE — 93798 PHYS/QHP OP CAR RHAB W/ECG: CPT

## 2022-10-17 ASSESSMENT — EXERCISE STRESS TEST
PEAK_METS: 3
PEAK_HR: 125

## 2022-10-19 ENCOUNTER — HOSPITAL ENCOUNTER (OUTPATIENT)
Dept: CARDIAC REHAB | Age: 72
Setting detail: RECURRING SERIES
Discharge: HOME | End: 2022-10-22
Payer: OTHER GOVERNMENT

## 2022-10-19 ENCOUNTER — CARE COORDINATION (OUTPATIENT)
Dept: OTHER | Facility: CLINIC | Age: 72
End: 2022-10-19

## 2022-10-19 VITALS — WEIGHT: 170.2 LBS | BODY MASS INDEX: 26.66 KG/M2

## 2022-10-19 PROCEDURE — 93798 PHYS/QHP OP CAR RHAB W/ECG: CPT

## 2022-10-19 ASSESSMENT — EXERCISE STRESS TEST
PEAK_HR: 123
PEAK_METS: 3
PEAK_BP: 90/60

## 2022-10-19 NOTE — CARE COORDINATION
[facility] Care Transitions Follow Up Call    Care Transition Nurse contacted the patient by telephone to follow up after admission on 2022. Verified name and  with patient as identifiers. Patient: Jorge Fuller  Patient : 1950   MRN: [id number]  Reason for Admission: Aortic stenosis, severe, S/P TAVR  Discharge Date: 22 RARS: Readmission Risk Score: 11.9      Needs to be reviewed by the provider   Additional needs identified to be addressed with provider: No  none             Method of communication with provider: none. Addressed changes since last contact:  none  Discussed follow-up appointments. If no appointment was previously scheduled, appointment scheduling offered: Yes. Is follow up appointment scheduled within 7 days of discharge? Yes.     Follow Up  Future Appointments   Date Time Provider Shorty Vora   10/31/2022  2:30 PM RENETTA PERKINS ECHO 36 [facility] AMB   10/31/2022  3:45 PM Marzena Mcginnis MD [facility] AMB   2022  9:00 AM Darrel Jimenez [facility] SFO   2022  8:00 AM Dwayne Iliana [facility] SFO   2022  8:00 AM Dwayne Iliana [facility] SFO   2022  8:00 AM Dwaynedeep Frankin [facility] SFO   2022  8:00 AM Rajat Mauney [facility] SFO   2022  8:00 AM Rajat Stolluney [facility] SFO   2022  8:00 AM Rajat Mauney [facility] SFO   2022  8:00 AM Rajat Mauney [facility] SFO   2022  8:00 AM Rajat Mauney [facility] SFO   2022  8:00 AM Rajat Mauney [facility] SFO   2022  8:00 AM Rajat Stolluney [facility] SFO   2022  8:00 AM Rajat Stolluney [facility] SFO   2022  8:00 AM Rajat Mauney [facility] SFO   2022  8:00 AM Rajat Stolluney [facility] SFO   2022  8:00 AM Rajat Cooper [facility] SFO   2022  8:00 AM Rajat Cooper [facility] SFO   2022  8:00 AM Rajat Cooper [facility] SFO   2022  8:00 AM Dwaynedeep Frankin [facility] SFO   2022  8:00 AM Dwayne Iliana [facility] SFO   2022  8:00 AM Dwayne Iliana [facility] O 12/28/2022  8:00 AM Felipe Arrow [facility] SFO   12/30/2022  8:00 AM Felipe Arrow [facility] SFO   2/21/2023  1:00 PM Adri Ordaz MD [facility] GVL AMB     Non-[facility] follow up appointment(s): no    Care Transition Nurse reviewed medical action plan and red flags with patient and discussed any barriers to care and/or understanding of plan of care after discharge. Discussed appropriate site of care based on symptoms and resources available to patient including: PCP  Specialist  When to call [address] [place] [address] Dr related references  Cardiac Rehab . The patient agrees to contact the PCP office for questions related to their healthcare. Patients top risk factors for readmission: medical condition-Severe Aortic Stenosis S/P TAVR, htn  Interventions to address risk factors: Education of patient/family/caregiver/guardian to support self-management-of post op care, medical conditions, and Assessment and support for treatment adherence and medication management-per directions. Patient reports he is doing well at this time. Patient is doing cardiac rehab. Patient endorses compliance with medications. Patient is aware of upcoming appointments. Patient given an opportunity to ask questions and does not have any further questions or concerns at this time. The patient is able to contact the PCP/Specialist office for questions related to their healthcare.     Offered patient enrollment in the Remote Patient Monitoring (RPM) program for in-home monitoring: NA.     Care Transitions Subsequent and Final Call    Schedule Follow Up Appointment with PCP: Completed  Subsequent and Final Calls  Do you have any ongoing symptoms?: No  Have your medications changed?: No  Do you currently have any active services?: No  Do you have any needs or concerns that I can assist you with?: No  Identified Barriers: None  Care Transitions Interventions  Other Interventions:           Care Transition Nurse provided contact information for future needs. Plan for follow up  based on severity of symptoms and risk factors. Plan for next call: self management-of medical conditions and follow up.     Marbella Chester RN

## 2022-10-21 ENCOUNTER — APPOINTMENT (OUTPATIENT)
Dept: CARDIAC REHAB | Age: 72
End: 2022-10-21

## 2022-10-24 ENCOUNTER — APPOINTMENT (OUTPATIENT)
Dept: CARDIAC REHAB | Age: 72
End: 2022-10-24

## 2022-10-26 ENCOUNTER — APPOINTMENT (OUTPATIENT)
Dept: CARDIAC REHAB | Age: 72
End: 2022-10-26

## 2022-10-27 ENCOUNTER — TELEPHONE (OUTPATIENT)
Dept: CARDIOLOGY CLINIC | Age: 72
End: 2022-10-27

## 2022-10-27 ENCOUNTER — CARE COORDINATION (OUTPATIENT)
Dept: OTHER | Facility: CLINIC | Age: 72
End: 2022-10-27

## 2022-10-27 NOTE — TELEPHONE ENCOUNTER
Pt's wife called on behalf of the pt to reschedule the pt's upcoming (Oct 31) echo and TAVR follow up appts.

## 2022-10-27 NOTE — CARE COORDINATION
CTN attempted outreach to patient for DAREN follow up call. Unable to reach patient. Will outreach again.

## 2022-10-28 ENCOUNTER — APPOINTMENT (OUTPATIENT)
Dept: CARDIAC REHAB | Age: 72
End: 2022-10-28

## 2022-10-28 ENCOUNTER — CARE COORDINATION (OUTPATIENT)
Dept: OTHER | Facility: CLINIC | Age: 72
End: 2022-10-28

## 2022-10-28 NOTE — CARE COORDINATION
Patient has graduated from the Care Transitions program on 10/28/2022. Patient/family has the ability to self-manage at this time. Patient has no further care management needs, no referral to the [facility] team for further management. Goals Addressed                   This Visit's Progress     Conditions and Symptoms   On track     I will schedule office visits, as directed by my provider. I will keep my appointment or reschedule if I have to cancel. I will follow my Zone Management tool to seek urgent or emergent care. I will notify my provider of any symptoms that indicate a worsening of my condition.     Barriers: none  Plan for overcoming my barriers: N/A  Confidence: 9/10  Anticipated Goal Completion Date: 10/29/22               Patients upcoming visits:    Future Appointments   Date Time Provider Shorty Vora   11/7/2022  9:00 AM Michelle Jimenez [facility] SFO   11/9/2022  8:00 AM Vestal Chick [facility] SFO   11/11/2022  8:00 AM Jermaine Chick [facility] SFO   11/14/2022  8:00 AM Jermaine Chick [facility] SFO   11/14/2022  2:30 PM [facility] [address] GVL AMB   11/14/2022  3:45 PM Jeannine Vargas MD [facility] AMB   11/16/2022  8:00 AM Vestal Chick [facility] SFO   11/18/2022  8:00 AM Jermaine Chick [facility] SFO   11/21/2022  8:00 AM Vestalleslie Matos [facility] SFO   11/23/2022  8:00 AM Rajat Gany [facility] SFO   11/28/2022  8:00 AM Rajat Mauney [facility] SFO   11/30/2022  8:00 AM Rajat Gany [facility] SFO   12/2/2022  8:00 AM Rajat Gany [facility] SFO   12/5/2022  8:00 AM Rajat Gany [facility] SFO   12/7/2022  8:00 AM Rajat Gany [facility] SFO   12/9/2022  8:00 AM Rajat Cooper [facility] SFO   12/12/2022  8:00 AM Rajat Cooper [facility] SFO   12/14/2022  8:00 AM Rajat Cooper [facility] SFO   12/16/2022  8:00 AM Rajat Cooper [facility] SFO   12/19/2022  8:00 AM Chato Cooper [facility] SFO   12/21/2022  8:00 AM Jermaine Chick [facility] SFO   12/23/2022  8:00 AM Vestal Chick [facility] SFO   12/28/2022  8:00 AM Vestal Chick [facility]   12/30/2022  8:00 AM Dwayne Barrientos [facility]   2/21/2023  1:00 PM Bonny Vee MD [facility] GVL AMB    Patient has Care Transition Nurse's contact information for any further questions, concerns, or needs.

## 2022-10-31 ENCOUNTER — APPOINTMENT (OUTPATIENT)
Dept: CARDIAC REHAB | Age: 72
End: 2022-10-31

## 2022-11-02 ENCOUNTER — APPOINTMENT (OUTPATIENT)
Dept: CARDIAC REHAB | Age: 72
End: 2022-11-02
Payer: OTHER GOVERNMENT

## 2022-11-04 ENCOUNTER — APPOINTMENT (OUTPATIENT)
Dept: CARDIAC REHAB | Age: 72
End: 2022-11-04
Payer: OTHER GOVERNMENT

## 2022-11-07 ENCOUNTER — HOSPITAL ENCOUNTER (OUTPATIENT)
Dept: CARDIAC REHAB | Age: 72
Setting detail: RECURRING SERIES
Discharge: HOME | End: 2022-11-10
Payer: OTHER GOVERNMENT

## 2022-11-07 PROCEDURE — 93798 PHYS/QHP OP CAR RHAB W/ECG: CPT

## 2022-11-07 ASSESSMENT — EXERCISE STRESS TEST
PEAK_METS: 3
PEAK_HR: 116

## 2022-11-09 ENCOUNTER — HOSPITAL ENCOUNTER (OUTPATIENT)
Dept: CARDIAC REHAB | Age: 72
Setting detail: RECURRING SERIES
Discharge: HOME | End: 2022-11-12
Payer: OTHER GOVERNMENT

## 2022-11-09 VITALS — BODY MASS INDEX: 27.22 KG/M2 | WEIGHT: 173.8 LBS

## 2022-11-09 PROCEDURE — 93798 PHYS/QHP OP CAR RHAB W/ECG: CPT

## 2022-11-09 ASSESSMENT — EXERCISE STRESS TEST
PEAK_BP: 112/62
PEAK_HR: 130
PEAK_METS: 3
PEAK_BP: 144/70

## 2022-11-09 ASSESSMENT — EJECTION FRACTION: EF_VALUE: 60

## 2022-11-09 ASSESSMENT — LIFESTYLE VARIABLES: SMOKELESS_TOBACCO: NO

## 2022-11-11 ENCOUNTER — HOSPITAL ENCOUNTER (OUTPATIENT)
Dept: CARDIAC REHAB | Age: 72
Setting detail: RECURRING SERIES
Discharge: HOME | End: 2022-11-14
Payer: OTHER GOVERNMENT

## 2022-11-11 ENCOUNTER — APPOINTMENT (OUTPATIENT)
Dept: CARDIAC REHAB | Age: 72
End: 2022-11-11
Payer: OTHER GOVERNMENT

## 2022-11-11 DIAGNOSIS — Z95.2 S/P TAVR (TRANSCATHETER AORTIC VALVE REPLACEMENT): ICD-10-CM

## 2022-11-11 LAB
ALBUMIN SERPL-MCNC: 4 G/DL (ref 3.2–4.6)
ALBUMIN/GLOB SERPL: 1.4 {RATIO} (ref 0.4–1.6)
ALP SERPL-CCNC: 124 U/L (ref 50–136)
ALT SERPL-CCNC: 28 U/L (ref 12–65)
ANION GAP SERPL CALC-SCNC: 4 MMOL/L (ref 2–11)
AST SERPL-CCNC: 14 U/L (ref 15–37)
BASOPHILS # BLD: 0 K/UL (ref 0–0.2)
BASOPHILS NFR BLD: 1 % (ref 0–2)
BILIRUB SERPL-MCNC: 0.4 MG/DL (ref 0.2–1.1)
BUN SERPL-MCNC: 14 MG/DL (ref 8–23)
CALCIUM SERPL-MCNC: 9.6 MG/DL (ref 8.3–10.4)
CHLORIDE SERPL-SCNC: 103 MMOL/L (ref 101–110)
CO2 SERPL-SCNC: 31 MMOL/L (ref 21–32)
CREAT SERPL-MCNC: 1 MG/DL (ref 0.8–1.5)
DIFFERENTIAL METHOD BLD: NORMAL
EOSINOPHIL # BLD: 0.1 K/UL (ref 0–0.8)
EOSINOPHIL NFR BLD: 1 % (ref 0.5–7.8)
ERYTHROCYTE [DISTWIDTH] IN BLOOD BY AUTOMATED COUNT: 13.2 % (ref 11.9–14.6)
GLOBULIN SER CALC-MCNC: 2.9 G/DL (ref 2.8–4.5)
GLUCOSE SERPL-MCNC: 97 MG/DL (ref 65–100)
HCT VFR BLD AUTO: 43.5 % (ref 41.1–50.3)
HGB BLD-MCNC: 14.1 G/DL (ref 13.6–17.2)
IMM GRANULOCYTES # BLD AUTO: 0 K/UL (ref 0–0.5)
IMM GRANULOCYTES NFR BLD AUTO: 0 % (ref 0–5)
LYMPHOCYTES # BLD: 0.9 K/UL (ref 0.5–4.6)
LYMPHOCYTES NFR BLD: 19 % (ref 13–44)
MCH RBC QN AUTO: 31.7 PG (ref 26.1–32.9)
MCHC RBC AUTO-ENTMCNC: 32.4 G/DL (ref 31.4–35)
MCV RBC AUTO: 97.8 FL (ref 82–102)
MONOCYTES # BLD: 0.6 K/UL (ref 0.1–1.3)
MONOCYTES NFR BLD: 11 % (ref 4–12)
NEUTS SEG # BLD: 3.5 K/UL (ref 1.7–8.2)
NEUTS SEG NFR BLD: 68 % (ref 43–78)
NRBC # BLD: 0 K/UL (ref 0–0.2)
PLATELET # BLD AUTO: 303 K/UL (ref 150–450)
PMV BLD AUTO: 9.4 FL (ref 9.4–12.3)
POTASSIUM SERPL-SCNC: 4.2 MMOL/L (ref 3.5–5.1)
PROT SERPL-MCNC: 6.9 G/DL (ref 6.3–8.2)
RBC # BLD AUTO: 4.45 M/UL (ref 4.23–5.6)
SODIUM SERPL-SCNC: 138 MMOL/L (ref 133–143)
WBC # BLD AUTO: 5 K/UL (ref 4.3–11.1)

## 2022-11-11 PROCEDURE — 93798 PHYS/QHP OP CAR RHAB W/ECG: CPT

## 2022-11-11 ASSESSMENT — EXERCISE STRESS TEST
PEAK_HR: 125
PEAK_METS: 3

## 2022-11-14 ENCOUNTER — OFFICE VISIT (OUTPATIENT)
Dept: CARDIOLOGY CLINIC | Age: 72
End: 2022-11-14
Payer: OTHER GOVERNMENT

## 2022-11-14 ENCOUNTER — APPOINTMENT (OUTPATIENT)
Dept: CARDIAC REHAB | Age: 72
End: 2022-11-14
Payer: OTHER GOVERNMENT

## 2022-11-14 VITALS
SYSTOLIC BLOOD PRESSURE: 126 MMHG | BODY MASS INDEX: 28.09 KG/M2 | DIASTOLIC BLOOD PRESSURE: 70 MMHG | HEIGHT: 67 IN | WEIGHT: 179 LBS

## 2022-11-14 DIAGNOSIS — I10 PRIMARY HYPERTENSION: Primary | ICD-10-CM

## 2022-11-14 DIAGNOSIS — Z95.2 S/P TAVR (TRANSCATHETER AORTIC VALVE REPLACEMENT): Chronic | ICD-10-CM

## 2022-11-14 PROCEDURE — 93000 ELECTROCARDIOGRAM COMPLETE: CPT | Performed by: INTERNAL MEDICINE

## 2022-11-14 PROCEDURE — 99214 OFFICE O/P EST MOD 30 MIN: CPT | Performed by: INTERNAL MEDICINE

## 2022-11-14 PROCEDURE — 1123F ACP DISCUSS/DSCN MKR DOCD: CPT | Performed by: INTERNAL MEDICINE

## 2022-11-14 PROCEDURE — 3078F DIAST BP <80 MM HG: CPT | Performed by: INTERNAL MEDICINE

## 2022-11-14 PROCEDURE — 3074F SYST BP LT 130 MM HG: CPT | Performed by: INTERNAL MEDICINE

## 2022-11-14 ASSESSMENT — KANSAS CITY CARDIOMYOPATHY QUESTIONNAIRE (KCCQ12)
2. OVER THE PAST 2 WEEKS, HOW MANY TIMES DID YOU HAVE SWELLING IN YOUR FEET, ANKLES OR LEGS WHEN YOU WOKE UP IN THE MORNING: 5
8C. OVER THE PAST 2 WEEKS, ON AVERAGE, HOW HAS HEART FAILURE LIMITED YOU ABILITY TO VISIT FAMILY AND FRIENDS OUR OF YOUR HOME: 5
3. OVER THE PAST 2 WEEKS, ON AVERAGE, HOW MANY TIMES HAS FATIGUE LIMITED YOUR ABILITY TO DO WHAT YOU WANTED: 7
1A. OVER THE PAST 2 WEEKS, HOW MUCH WERE YOU LIMITED BY HEART FAILURE SYMPTOMS (SHORTNESS OF BREATH OR FATIGUE) WHEN SHOWERING OR BATHING: 5
8B. OVER THE PAST 2 WEEKS, ON AVERAGE, HOW HAS HEART FAILURE LIMITED YOU ABILITY TO WORK OR DO HOUSEHOLD CHORES: 5
1C. OVER THE PAST 2 WEEKS, HOW MUCH WERE YOU LIMITED BY HEART FAILURE SYMPTOMS (SHORTNESS OF BREATH OR FATIGUE) WHEN HURRYING OR JOGGING (AS IF TO CATCH A BUS): 5
1B. OVER THE PAST 2 WEEKS, HOW MUCH WERE YOU LIMITED BY HEART FAILURE SYMPTOMS (SHORTNESS OF BREATH OR FATIGUE) WHEN WALKING 1 BLOCK ON LEVEL GROUND: 5
5. OVER THE PAST 2 WEEKS, ON AVERAGE, HOW MANY TIMES HAVE YOU BEEN FORCED TO SLEEP SITTING UP IN A CHAIR OR WITH AT LEAST 3 PILLOWS TO PROP YOU UP BECAUSE OF SHORTNESS OF BREATH?: 5
7. IF YOU HAD TO SPEND THE REST OF YOUR LIFE WITH YOUR HEART FAILURE THE WAY IT IS RIGHT NOW, HOW WOULD YOU FEEL ABOUT THIS?: 5
4. OVER THE PAST 2 WEEKS, ON AVERAGE, HOW MANY TIMES HAS SHORTNESS OF BREATH LIMITED YOUR ABILITY TO DO WHAT YOU WANTED: 7
8A. OVER THE PAST 2 WEEKS, ON AVERAGE, HOW HAS HEART FAILURE LIMITED YOU ABILITY TO DO HOBBIES OR RECREATIONAL ACTIVITIES: 5
6. OVER THE PAST 2 WEEKS, HOW MUCH HAS YOUR HEART FAILURE LIMITED YOUR ENJOYMENT OF LIFE: 5

## 2022-11-14 ASSESSMENT — ENCOUNTER SYMPTOMS
BACK PAIN: 0
SHORTNESS OF BREATH: 1
ABDOMINAL PAIN: 0
COUGH: 0

## 2022-11-14 NOTE — PROGRESS NOTES
[facility] CARDIOLOGY  [address], [address]      22      NAME:  Mervat Kumari  : 1950  MRN: [id number]      SUBJECTIVE:   Mervat Kumari is a 66 y.o. male seen for a follow up visit regarding the following:     Chief Complaint   Patient presents with    Hypertension       HPI:   66 y.o. male with critical aortic stenosis. He has type I bicuspid valve. 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 Patient is here today for 30 day follow-up. Please see nursing documentation for medication reconciliation discharge diagnoses. Patient is healed up well and is [place] heart association class I/IICHF. Mean gradient 15mmHg today on echo. Past Medical History, Past Surgical History, Family history, Social History, and Medications were all reviewed with the patient today and updated as necessary. Current Outpatient Medications   Medication Sig Dispense Refill    aspirin 81 MG chewable tablet Take 1 tablet by mouth daily 30 tablet 11    clopidogrel (PLAVIX) 75 MG tablet Take 1 tablet by mouth daily 30 tablet 11    venlafaxine (EFFEXOR XR) 150 MG extended release capsule Take 150 mg by mouth daily      finasteride (PROSCAR) 5 MG tablet Take 5 mg by mouth daily      donepezil (ARICEPT) 10 MG tablet Take 5 mg by mouth nightly      OXcarbazepine (TRILEPTAL) 300 MG tablet Take 300 mg by mouth 2 times daily      amLODIPine (NORVASC) 10 MG tablet Take 10 mg by mouth daily      losartan (COZAAR) 50 MG tablet Take 50 mg by mouth daily      tamsulosin (FLOMAX) 0.4 MG capsule Take 0.4 mg by mouth daily      Zinc Acetate, Oral, (ZINC ACETATE PO) Take by mouth daily      Multiple Vitamin (MULTI-DAY PO) Take by mouth daily      FOLIC ACID PO Take by mouth daily      Ascorbic Acid (VITAMIN C PO) Take by mouth daily       No current facility-administered medications for this visit.      Patient Active Problem List    Diagnosis Date Noted S/P TAVR (transcatheter aortic valve replacement) 10/03/2022     Priority: Medium     09/2022:  29mm Graff earle ultra valve      CONTRERAS (dyspnea on exertion) 08/26/2022     Priority: Medium    Hypertension 08/26/2022     Priority: Medium    Left lower quadrant abdominal pain 10/07/2021     Priority: Low    Wound, open, abdominal wall, anterior 12/14/2011     Priority: Low      Family History   Problem Relation Age of Onset    Cancer Father     High Blood Pressure Paternal Uncle     Diabetes Paternal Uncle      Social History     Tobacco Use    Smoking status: Never    Smokeless tobacco: Former     Types: Chew     Quit date: 1980   Substance Use Topics    Alcohol use: No       Review Of Symptoms    Review of Systems   Constitutional: Negative for fever and malaise/fatigue. HENT:  Negative for nosebleeds. Cardiovascular:  Negative for chest pain, dyspnea on exertion and palpitations. Respiratory:  Positive for shortness of breath. Negative for cough. Musculoskeletal:  Negative for back pain, muscle cramps, muscle weakness and myalgias. Gastrointestinal:  Negative for abdominal pain. Neurological:  Negative for dizziness. Physical Exam  Blood pressure 126/70, height 5' 7\" (1.702 m), weight 179 lb (81.2 kg). Physical Exam  HENT:      Head: Normocephalic and atraumatic. Eyes:      Extraocular Movements: Extraocular movements intact. Cardiovascular:      Rate and Rhythm: Normal rate and regular rhythm. Heart sounds: No murmur heard. Pulmonary:      Effort: Pulmonary effort is normal.      Breath sounds: Normal breath sounds. Abdominal:      Palpations: Abdomen is soft. Musculoskeletal:         General: Normal range of motion. Skin:     General: Skin is warm and dry. Neurological:      General: No focal deficit present. Mental Status: He is oriented to person, place, and time.          Medical problems, medical history and test results were reviewed with the patient today. No results found for: CHOL  No results found for: TRIG  No results found for: HDL  No results found for: LDLCHOLESTEROL, LDLCALC  No results found for: LABVLDL, VLDL  No results found for: [facility]     Lab Results   Component Value Date    LABA1C 5.1 09/26/2022     Lab Results   Component Value Date     09/26/2022        Lab Results   Component Value Date     11/11/2022    K 4.2 11/11/2022     11/11/2022    CO2 31 11/11/2022    BUN 14 11/11/2022    CREATININE 1.00 11/11/2022    GLUCOSE 97 11/11/2022    CALCIUM 9.6 11/11/2022    PROT 6.9 11/11/2022    LABALBU 4.0 11/11/2022    BILITOT 0.4 11/11/2022    ALKPHOS 124 11/11/2022    AST 14 (L) 11/11/2022    ALT 28 11/11/2022    LABGLOM >60 11/11/2022    GFRAA >60 09/28/2022    AGRATIO 1.2 09/01/2020    GLOB 2.9 11/11/2022       Lab Results   Component Value Date/Time     11/11/2022 10:50 AM    K 4.2 11/11/2022 10:50 AM     11/11/2022 10:50 AM    CO2 31 11/11/2022 10:50 AM    BUN 14 11/11/2022 10:50 AM    CREATININE 1.00 11/11/2022 10:50 AM    GLUCOSE 97 11/11/2022 10:50 AM    CALCIUM 9.6 11/11/2022 10:50 AM        Lab Results   Component Value Date    WBC 5.0 11/11/2022    HGB 14.1 11/11/2022    HCT 43.5 11/11/2022    MCV 97.8 11/11/2022     11/11/2022             ASSESSMENT:    Sarah Gracia was seen today for hypertension. Diagnoses and all orders for this visit:    S/P TAVR -patient status post TAVR 09/2022 with 29 mm Graff VIVI ultra valve for severe bicuspid aortic stenosis. He is stable on aspirin and clopidogrel. Patient is here for 30-day follow-up and echocardiogram with normal LVEF and aortic valve function. New LBBB and will monitor. Hypertension, unspecified type -well-controlled on amlodipine and losartan.             Problem List Items Addressed This Visit          Circulatory    S/P TAVR (transcatheter aortic valve replacement) (Chronic)    Hypertension - Primary    Relevant Orders    EKG 12 Lead (Completed)     There are no discontinued medications. Patient has been instructed and agrees to call our office with any issues or other concerns related to their cardiac condition(s) and/or complaint(s). Return in about 6 months (around 5/14/2023).        Burak Cannon MD  11/14/2022

## 2022-11-15 ENCOUNTER — TELEPHONE (OUTPATIENT)
Dept: CARDIOLOGY CLINIC | Age: 72
End: 2022-11-15

## 2022-11-15 NOTE — TELEPHONE ENCOUNTER
Triage informed Sergio Jimenez, pt's EC, of Dr. Benoit Lopez response. She verbalizes understanding and agrees to plan.

## 2022-11-15 NOTE — TELEPHONE ENCOUNTER
----- Message from Adri Ordaz MD sent at 11/14/2022  5:43 PM EST -----  Please let the patient know that he has a normal functioning prosthetic aortic valve. His heart function is normal.  It appears he has follow-up with with both me and Dr. Nohemi Garcia.  Please reach out to Dr. Rebecca Mathews [facility] to see if the patient needs to continue to follow-up with him for TAVR surveillance. He should keep his appointment in February to see me.

## 2022-11-16 ENCOUNTER — APPOINTMENT (OUTPATIENT)
Dept: CARDIAC REHAB | Age: 72
End: 2022-11-16
Payer: OTHER GOVERNMENT

## 2022-11-16 ENCOUNTER — HOSPITAL ENCOUNTER (OUTPATIENT)
Dept: CARDIAC REHAB | Age: 72
Setting detail: RECURRING SERIES
Discharge: HOME | End: 2022-11-19
Payer: OTHER GOVERNMENT

## 2022-11-16 VITALS — BODY MASS INDEX: 27.44 KG/M2 | WEIGHT: 175.2 LBS

## 2022-11-16 PROCEDURE — 93798 PHYS/QHP OP CAR RHAB W/ECG: CPT

## 2022-11-16 ASSESSMENT — EXERCISE STRESS TEST
PEAK_BP: 128/72
PEAK_METS: 3
PEAK_HR: 126

## 2022-11-18 ENCOUNTER — APPOINTMENT (OUTPATIENT)
Dept: CARDIAC REHAB | Age: 72
End: 2022-11-18
Payer: OTHER GOVERNMENT

## 2022-11-18 ENCOUNTER — HOSPITAL ENCOUNTER (OUTPATIENT)
Dept: CARDIAC REHAB | Age: 72
Setting detail: RECURRING SERIES
Discharge: HOME | End: 2022-11-21
Payer: OTHER GOVERNMENT

## 2022-11-18 PROCEDURE — 93798 PHYS/QHP OP CAR RHAB W/ECG: CPT

## 2022-11-18 ASSESSMENT — EXERCISE STRESS TEST
PEAK_HR: 120
PEAK_METS: 3

## 2022-11-21 ENCOUNTER — APPOINTMENT (OUTPATIENT)
Dept: CARDIAC REHAB | Age: 72
End: 2022-11-21
Payer: OTHER GOVERNMENT

## 2022-11-21 ENCOUNTER — HOSPITAL ENCOUNTER (OUTPATIENT)
Dept: CARDIAC REHAB | Age: 72
Setting detail: RECURRING SERIES
Discharge: HOME | End: 2022-11-24
Payer: OTHER GOVERNMENT

## 2022-11-21 PROCEDURE — 93798 PHYS/QHP OP CAR RHAB W/ECG: CPT

## 2022-11-21 ASSESSMENT — EXERCISE STRESS TEST
PEAK_BP: 120/70
PEAK_METS: 3.3
PEAK_HR: 130

## 2022-11-23 ENCOUNTER — HOSPITAL ENCOUNTER (OUTPATIENT)
Dept: CARDIAC REHAB | Age: 72
Setting detail: RECURRING SERIES
Discharge: HOME | End: 2022-11-26
Payer: OTHER GOVERNMENT

## 2022-11-23 ENCOUNTER — APPOINTMENT (OUTPATIENT)
Dept: CARDIAC REHAB | Age: 72
End: 2022-11-23
Payer: OTHER GOVERNMENT

## 2022-11-23 VITALS — BODY MASS INDEX: 27.35 KG/M2 | WEIGHT: 174.6 LBS

## 2022-11-23 PROCEDURE — 93798 PHYS/QHP OP CAR RHAB W/ECG: CPT

## 2022-11-23 ASSESSMENT — EXERCISE STRESS TEST
PEAK_METS: 3.4
PEAK_HR: 134

## 2022-11-28 ENCOUNTER — HOSPITAL ENCOUNTER (OUTPATIENT)
Dept: CARDIAC REHAB | Age: 72
Setting detail: RECURRING SERIES
Discharge: HOME | End: 2022-12-01
Payer: OTHER GOVERNMENT

## 2022-11-28 ENCOUNTER — APPOINTMENT (OUTPATIENT)
Dept: CARDIAC REHAB | Age: 72
End: 2022-11-28
Payer: OTHER GOVERNMENT

## 2022-11-28 PROCEDURE — 93798 PHYS/QHP OP CAR RHAB W/ECG: CPT

## 2022-11-28 ASSESSMENT — EXERCISE STRESS TEST
PEAK_METS: 3.4
PEAK_HR: 124
PEAK_BP: 150/76

## 2022-11-30 ENCOUNTER — APPOINTMENT (OUTPATIENT)
Dept: CARDIAC REHAB | Age: 72
End: 2022-11-30
Payer: OTHER GOVERNMENT

## 2022-11-30 ENCOUNTER — HOSPITAL ENCOUNTER (OUTPATIENT)
Dept: CARDIAC REHAB | Age: 72
Setting detail: RECURRING SERIES
Discharge: HOME | End: 2022-12-03
Payer: OTHER GOVERNMENT

## 2022-11-30 PROCEDURE — 93798 PHYS/QHP OP CAR RHAB W/ECG: CPT

## 2022-11-30 ASSESSMENT — EXERCISE STRESS TEST
PEAK_METS: 3.5
PEAK_HR: 114

## 2022-12-02 ENCOUNTER — APPOINTMENT (OUTPATIENT)
Dept: CARDIAC REHAB | Age: 72
End: 2022-12-02
Payer: OTHER GOVERNMENT

## 2022-12-02 ENCOUNTER — HOSPITAL ENCOUNTER (OUTPATIENT)
Dept: CARDIAC REHAB | Age: 72
Setting detail: RECURRING SERIES
Discharge: HOME | End: 2022-12-05
Payer: OTHER GOVERNMENT

## 2022-12-02 PROCEDURE — 93798 PHYS/QHP OP CAR RHAB W/ECG: CPT

## 2022-12-02 ASSESSMENT — EXERCISE STRESS TEST
PEAK_HR: 132
PEAK_METS: 3.5

## 2022-12-05 ENCOUNTER — HOSPITAL ENCOUNTER (OUTPATIENT)
Dept: CARDIAC REHAB | Age: 72
Setting detail: RECURRING SERIES
Discharge: HOME | End: 2022-12-08
Payer: OTHER GOVERNMENT

## 2022-12-05 ENCOUNTER — APPOINTMENT (OUTPATIENT)
Dept: CARDIAC REHAB | Age: 72
End: 2022-12-05
Payer: OTHER GOVERNMENT

## 2022-12-05 PROCEDURE — 93798 PHYS/QHP OP CAR RHAB W/ECG: CPT

## 2022-12-05 ASSESSMENT — EXERCISE STRESS TEST
PEAK_METS: 3.5
PEAK_BP: 120/58
PEAK_HR: 131

## 2022-12-07 ENCOUNTER — APPOINTMENT (OUTPATIENT)
Dept: CARDIAC REHAB | Age: 72
End: 2022-12-07
Payer: OTHER GOVERNMENT

## 2022-12-07 ENCOUNTER — HOSPITAL ENCOUNTER (OUTPATIENT)
Dept: CARDIAC REHAB | Age: 72
Setting detail: RECURRING SERIES
Discharge: HOME | End: 2022-12-10
Payer: OTHER GOVERNMENT

## 2022-12-07 VITALS — BODY MASS INDEX: 27.69 KG/M2 | WEIGHT: 176.8 LBS

## 2022-12-07 PROCEDURE — 93798 PHYS/QHP OP CAR RHAB W/ECG: CPT

## 2022-12-07 ASSESSMENT — EJECTION FRACTION: EF_VALUE: 60

## 2022-12-07 ASSESSMENT — LIFESTYLE VARIABLES: SMOKELESS_TOBACCO: NO

## 2022-12-07 ASSESSMENT — EXERCISE STRESS TEST
PEAK_BP: 120/58
PEAK_METS: 3.6
PEAK_HR: 117

## 2022-12-09 ENCOUNTER — APPOINTMENT (OUTPATIENT)
Dept: CARDIAC REHAB | Age: 72
End: 2022-12-09
Payer: OTHER GOVERNMENT

## 2022-12-09 ENCOUNTER — HOSPITAL ENCOUNTER (OUTPATIENT)
Dept: CARDIAC REHAB | Age: 72
Setting detail: RECURRING SERIES
Discharge: HOME | End: 2022-12-12
Payer: OTHER GOVERNMENT

## 2022-12-09 PROCEDURE — 93798 PHYS/QHP OP CAR RHAB W/ECG: CPT

## 2022-12-09 ASSESSMENT — EXERCISE STRESS TEST
PEAK_HR: 142
PEAK_METS: 3.6

## 2022-12-12 ENCOUNTER — APPOINTMENT (OUTPATIENT)
Dept: CARDIAC REHAB | Age: 72
End: 2022-12-12
Payer: OTHER GOVERNMENT

## 2022-12-12 ENCOUNTER — HOSPITAL ENCOUNTER (OUTPATIENT)
Dept: CARDIAC REHAB | Age: 72
Setting detail: RECURRING SERIES
Discharge: HOME | End: 2022-12-15
Payer: OTHER GOVERNMENT

## 2022-12-12 PROCEDURE — 93798 PHYS/QHP OP CAR RHAB W/ECG: CPT

## 2022-12-12 ASSESSMENT — EXERCISE STRESS TEST
PEAK_BP: 148/70
PEAK_METS: 3.8
PEAK_HR: 154

## 2022-12-14 ENCOUNTER — APPOINTMENT (OUTPATIENT)
Dept: CARDIAC REHAB | Age: 72
End: 2022-12-14
Payer: OTHER GOVERNMENT

## 2022-12-14 ENCOUNTER — HOSPITAL ENCOUNTER (OUTPATIENT)
Dept: CARDIAC REHAB | Age: 72
Setting detail: RECURRING SERIES
Discharge: HOME | End: 2022-12-17
Payer: OTHER GOVERNMENT

## 2022-12-14 VITALS — BODY MASS INDEX: 27.47 KG/M2 | WEIGHT: 175.4 LBS

## 2022-12-14 PROCEDURE — 93798 PHYS/QHP OP CAR RHAB W/ECG: CPT

## 2022-12-14 ASSESSMENT — EXERCISE STRESS TEST
PEAK_METS: 4.2
PEAK_HR: 131

## 2022-12-16 ENCOUNTER — APPOINTMENT (OUTPATIENT)
Dept: CARDIAC REHAB | Age: 72
End: 2022-12-16
Payer: OTHER GOVERNMENT

## 2022-12-16 ENCOUNTER — HOSPITAL ENCOUNTER (OUTPATIENT)
Dept: CARDIAC REHAB | Age: 72
Setting detail: RECURRING SERIES
Discharge: HOME | End: 2022-12-19
Payer: OTHER GOVERNMENT

## 2022-12-16 PROCEDURE — 93798 PHYS/QHP OP CAR RHAB W/ECG: CPT

## 2022-12-16 ASSESSMENT — EXERCISE STRESS TEST
PEAK_HR: 131
PEAK_METS: 4.2

## 2022-12-19 ENCOUNTER — APPOINTMENT (OUTPATIENT)
Dept: CARDIAC REHAB | Age: 72
End: 2022-12-19
Payer: OTHER GOVERNMENT

## 2022-12-19 ENCOUNTER — HOSPITAL ENCOUNTER (OUTPATIENT)
Dept: CARDIAC REHAB | Age: 72
Setting detail: RECURRING SERIES
Discharge: HOME | End: 2022-12-22
Payer: OTHER GOVERNMENT

## 2022-12-19 PROCEDURE — 93798 PHYS/QHP OP CAR RHAB W/ECG: CPT

## 2022-12-19 ASSESSMENT — EXERCISE STRESS TEST
PEAK_HR: 131
PEAK_BP: 120/70
PEAK_METS: 4.2

## 2022-12-21 ENCOUNTER — HOSPITAL ENCOUNTER (OUTPATIENT)
Dept: CARDIAC REHAB | Age: 72
Setting detail: RECURRING SERIES
Discharge: HOME | End: 2022-12-24
Payer: OTHER GOVERNMENT

## 2022-12-21 ENCOUNTER — APPOINTMENT (OUTPATIENT)
Dept: CARDIAC REHAB | Age: 72
End: 2022-12-21
Payer: OTHER GOVERNMENT

## 2022-12-21 VITALS — BODY MASS INDEX: 27.38 KG/M2 | WEIGHT: 174.8 LBS

## 2022-12-21 PROCEDURE — 93798 PHYS/QHP OP CAR RHAB W/ECG: CPT

## 2022-12-21 ASSESSMENT — EXERCISE STRESS TEST
PEAK_HR: 138
PEAK_BP: 106/54
PEAK_METS: 3.8

## 2022-12-23 ENCOUNTER — APPOINTMENT (OUTPATIENT)
Dept: CARDIAC REHAB | Age: 72
End: 2022-12-23
Payer: OTHER GOVERNMENT

## 2022-12-23 ENCOUNTER — HOSPITAL ENCOUNTER (OUTPATIENT)
Dept: CARDIAC REHAB | Age: 72
Setting detail: RECURRING SERIES
Discharge: HOME | End: 2022-12-26
Payer: OTHER GOVERNMENT

## 2022-12-23 PROCEDURE — 93798 PHYS/QHP OP CAR RHAB W/ECG: CPT

## 2022-12-23 ASSESSMENT — EXERCISE STRESS TEST
PEAK_METS: 4.2
PEAK_HR: 141

## 2022-12-28 ENCOUNTER — APPOINTMENT (OUTPATIENT)
Dept: CARDIAC REHAB | Age: 72
End: 2022-12-28
Payer: OTHER GOVERNMENT

## 2022-12-30 ENCOUNTER — APPOINTMENT (OUTPATIENT)
Dept: CARDIAC REHAB | Age: 72
End: 2022-12-30
Payer: OTHER GOVERNMENT

## 2023-01-06 ASSESSMENT — LIFESTYLE VARIABLES: SMOKELESS_TOBACCO: NO

## 2023-01-06 ASSESSMENT — EXERCISE STRESS TEST: PEAK_BP: 128/60

## 2023-01-06 ASSESSMENT — EJECTION FRACTION: EF_VALUE: 60

## 2023-01-09 ENCOUNTER — HOSPITAL ENCOUNTER (OUTPATIENT)
Dept: CARDIAC REHAB | Age: 73
Setting detail: RECURRING SERIES
Discharge: HOME | End: 2023-01-12
Payer: OTHER GOVERNMENT

## 2023-01-09 PROCEDURE — 93798 PHYS/QHP OP CAR RHAB W/ECG: CPT

## 2023-01-09 ASSESSMENT — EXERCISE STRESS TEST
PEAK_BP: 128/60
PEAK_METS: 3.8
PEAK_HR: 146

## 2023-01-13 ENCOUNTER — HOSPITAL ENCOUNTER (OUTPATIENT)
Dept: CARDIAC REHAB | Age: 73
Setting detail: RECURRING SERIES
Discharge: HOME | End: 2023-01-16
Payer: OTHER GOVERNMENT

## 2023-01-13 PROCEDURE — 93798 PHYS/QHP OP CAR RHAB W/ECG: CPT

## 2023-01-13 ASSESSMENT — EXERCISE STRESS TEST
PEAK_HR: 137
PEAK_METS: 4.2

## 2023-01-16 ENCOUNTER — HOSPITAL ENCOUNTER (OUTPATIENT)
Dept: CARDIAC REHAB | Age: 73
Setting detail: RECURRING SERIES
Discharge: HOME | End: 2023-01-19
Payer: OTHER GOVERNMENT

## 2023-01-16 PROCEDURE — 93798 PHYS/QHP OP CAR RHAB W/ECG: CPT

## 2023-01-16 ASSESSMENT — EXERCISE STRESS TEST
PEAK_HR: 140
PEAK_METS: 4.2

## 2023-01-18 ENCOUNTER — HOSPITAL ENCOUNTER (OUTPATIENT)
Dept: CARDIAC REHAB | Age: 73
Setting detail: RECURRING SERIES
Discharge: HOME | End: 2023-01-21
Payer: OTHER GOVERNMENT

## 2023-01-18 VITALS — BODY MASS INDEX: 27.57 KG/M2 | WEIGHT: 176 LBS

## 2023-01-18 PROCEDURE — 93798 PHYS/QHP OP CAR RHAB W/ECG: CPT

## 2023-01-18 ASSESSMENT — EXERCISE STRESS TEST
PEAK_HR: 126
PEAK_METS: 4.2

## 2023-01-20 ENCOUNTER — HOSPITAL ENCOUNTER (OUTPATIENT)
Dept: CARDIAC REHAB | Age: 73
Setting detail: RECURRING SERIES
Discharge: HOME | End: 2023-01-23
Payer: OTHER GOVERNMENT

## 2023-01-20 PROCEDURE — 93798 PHYS/QHP OP CAR RHAB W/ECG: CPT

## 2023-01-20 ASSESSMENT — EXERCISE STRESS TEST
PEAK_HR: 120
PEAK_METS: 4.2

## 2023-01-23 ENCOUNTER — HOSPITAL ENCOUNTER (OUTPATIENT)
Dept: CARDIAC REHAB | Age: 73
Setting detail: RECURRING SERIES
Discharge: HOME | End: 2023-01-26
Payer: OTHER GOVERNMENT

## 2023-01-23 PROCEDURE — 93798 PHYS/QHP OP CAR RHAB W/ECG: CPT

## 2023-01-23 ASSESSMENT — EXERCISE STRESS TEST
PEAK_HR: 136
PEAK_METS: 4.2

## 2023-01-25 ENCOUNTER — HOSPITAL ENCOUNTER (OUTPATIENT)
Dept: CARDIAC REHAB | Age: 73
Setting detail: RECURRING SERIES
Discharge: HOME | End: 2023-01-28
Payer: OTHER GOVERNMENT

## 2023-01-25 VITALS — BODY MASS INDEX: 27.88 KG/M2 | WEIGHT: 178 LBS

## 2023-01-25 PROCEDURE — 93798 PHYS/QHP OP CAR RHAB W/ECG: CPT

## 2023-01-25 ASSESSMENT — EXERCISE STRESS TEST
PEAK_HR: 133
PEAK_METS: 4.4
PEAK_BP: 140/70

## 2023-01-27 ENCOUNTER — HOSPITAL ENCOUNTER (OUTPATIENT)
Dept: CARDIAC REHAB | Age: 73
Setting detail: RECURRING SERIES
Discharge: HOME | End: 2023-01-30
Payer: OTHER GOVERNMENT

## 2023-01-27 PROCEDURE — 93798 PHYS/QHP OP CAR RHAB W/ECG: CPT

## 2023-01-27 ASSESSMENT — PATIENT HEALTH QUESTIONNAIRE - PHQ9
SUM OF ALL RESPONSES TO PHQ9 QUESTIONS 1 & 2: 1
3. TROUBLE FALLING OR STAYING ASLEEP: 0
6. FEELING BAD ABOUT YOURSELF - OR THAT YOU ARE A FAILURE OR HAVE LET YOURSELF OR YOUR FAMILY DOWN: 1
2. FEELING DOWN, DEPRESSED OR HOPELESS: 0
SUM OF ALL RESPONSES TO PHQ QUESTIONS 1-9: 2
5. POOR APPETITE OR OVEREATING: 0
7. TROUBLE CONCENTRATING ON THINGS, SUCH AS READING THE NEWSPAPER OR WATCHING TELEVISION: 0
8. MOVING OR SPEAKING SO SLOWLY THAT OTHER PEOPLE COULD HAVE NOTICED. OR THE OPPOSITE, BEING SO FIGETY OR RESTLESS THAT YOU HAVE BEEN MOVING AROUND A LOT MORE THAN USUAL: 0
4. FEELING TIRED OR HAVING LITTLE ENERGY: 0
SUM OF ALL RESPONSES TO PHQ QUESTIONS 1-9: 2
SUM OF ALL RESPONSES TO PHQ QUESTIONS 1-9: 2
1. LITTLE INTEREST OR PLEASURE IN DOING THINGS: 1
SUM OF ALL RESPONSES TO PHQ QUESTIONS 1-9: 2
9. THOUGHTS THAT YOU WOULD BE BETTER OFF DEAD, OR OF HURTING YOURSELF: 0

## 2023-01-27 ASSESSMENT — EXERCISE STRESS TEST
PEAK_HR: 125
PEAK_METS: 4.4

## 2023-01-30 ENCOUNTER — HOSPITAL ENCOUNTER (OUTPATIENT)
Dept: CARDIAC REHAB | Age: 73
Setting detail: RECURRING SERIES
Discharge: HOME | End: 2023-02-02
Payer: OTHER GOVERNMENT

## 2023-01-30 PROCEDURE — 93798 PHYS/QHP OP CAR RHAB W/ECG: CPT

## 2023-01-30 ASSESSMENT — EXERCISE STRESS TEST
PEAK_METS: 4.4
PEAK_HR: 131

## 2023-02-01 ENCOUNTER — HOSPITAL ENCOUNTER (OUTPATIENT)
Dept: CARDIAC REHAB | Age: 73
Setting detail: RECURRING SERIES
Discharge: HOME | End: 2023-02-04
Payer: OTHER GOVERNMENT

## 2023-02-01 VITALS — BODY MASS INDEX: 28.32 KG/M2 | WEIGHT: 180.8 LBS

## 2023-02-01 PROCEDURE — 93798 PHYS/QHP OP CAR RHAB W/ECG: CPT

## 2023-02-01 ASSESSMENT — EXERCISE STRESS TEST
PEAK_HR: 129
PEAK_METS: 4.4
PEAK_BP: 132/64

## 2023-02-03 ENCOUNTER — HOSPITAL ENCOUNTER (OUTPATIENT)
Dept: CARDIAC REHAB | Age: 73
Setting detail: RECURRING SERIES
Discharge: HOME | End: 2023-02-06
Payer: OTHER GOVERNMENT

## 2023-02-03 PROCEDURE — 93798 PHYS/QHP OP CAR RHAB W/ECG: CPT

## 2023-02-03 ASSESSMENT — EXERCISE STRESS TEST
PEAK_METS: 4.8
PEAK_HR: 139

## 2023-02-06 ENCOUNTER — HOSPITAL ENCOUNTER (OUTPATIENT)
Dept: CARDIAC REHAB | Age: 73
Setting detail: RECURRING SERIES
Discharge: HOME | End: 2023-02-09
Payer: OTHER GOVERNMENT

## 2023-02-06 PROCEDURE — 93798 PHYS/QHP OP CAR RHAB W/ECG: CPT

## 2023-02-06 ASSESSMENT — EXERCISE STRESS TEST
PEAK_HR: 137
PEAK_METS: 4.4
PEAK_BP: 108/62

## 2023-02-08 ENCOUNTER — HOSPITAL ENCOUNTER (OUTPATIENT)
Dept: CARDIAC REHAB | Age: 73
Setting detail: RECURRING SERIES
Discharge: HOME | End: 2023-02-11
Payer: OTHER GOVERNMENT

## 2023-02-08 PROCEDURE — 93798 PHYS/QHP OP CAR RHAB W/ECG: CPT

## 2023-02-10 ASSESSMENT — EXERCISE STRESS TEST: PEAK_BP: 140/70

## 2023-02-10 ASSESSMENT — EJECTION FRACTION: EF_VALUE: 60

## 2023-02-10 ASSESSMENT — LIFESTYLE VARIABLES: SMOKELESS_TOBACCO: NO

## 2023-02-17 ENCOUNTER — OFFICE VISIT (OUTPATIENT)
Dept: ORTHOPEDIC SURGERY | Age: 73
End: 2023-02-17

## 2023-02-17 VITALS — HEIGHT: 66 IN | WEIGHT: 188.01 LBS | BODY MASS INDEX: 30.22 KG/M2

## 2023-02-17 DIAGNOSIS — M17.11 PRIMARY OSTEOARTHRITIS OF RIGHT KNEE: ICD-10-CM

## 2023-02-17 DIAGNOSIS — M25.561 ACUTE PAIN OF RIGHT KNEE: Primary | ICD-10-CM

## 2023-02-17 NOTE — PROGRESS NOTES
Name: David Corrales  YOB: 1950  Gender: male  MRN: [id number]    CC: Right knee pain    HPI: David Corrales is a 74 y.o. male who presents primarily with right knee pain and instability. He states has been bothering him for for 5 years. He is active and does work around his home but does so with some insecurity with the knee and occasional episodes of relatively significant locking. He has been seen elsewhere and has had aspirations and injections in the past.  He states it is now bothering him enough that he wants to consider definitive options and without he comes in today for further recommendations and treatment. History was obtained from patient and spouse    ROS/Meds/PSH/PMH/FH/SH: I personally reviewed the patients standard intake form. Below are the pertinents    Tobacco:  reports that he has never smoked. He quit smokeless tobacco use about 43 years ago. His smokeless tobacco use included chew. Past Medical History:   Diagnosis Date    History of PFTs 09/06/2022    History of transesophageal echocardiography ([facility]) 09/02/2022    EF of 50 - 55%;  Moderate regurgitation mitral & tricuspid; severe aortic stenosis    Hypertension     Neck injury 01/25/2022    fractured C2; reports neck brace and no surgery    Seizures ([facility] [address] 1995      Past Surgical History:   Procedure Laterality Date    ABDOMEN SURGERY      repaired chain saw laceration    CARDIAC PROCEDURE N/A 09/02/2022    Left heart cath / coronary angiography performed by Elis Chaudhary MD at [address] [facility] N/A 09/27/2022    Transcatheter aortic valve replacement performed by Salvador Murrieta MD at [place] [address] 09/02/2022    Angiography lower ext bilat performed by Elis Chaudhary MD at [address] [facility] [address] Left 2015    left tibia fx and kelsey placed    ORTHOPEDIC SURGERY Right     rotator cuff repair    CA ABDOMEN SURGERY PROC UNLISTED SHOULDER SURGERY Left     shoulder fracture - 2 screws placed    UMBILICAL HERNIA REPAIR      double hernia repair        Physical Examination:  Physical exam: On examination of the patient's gait there is there is varus deformity in the right knee    On examination while standing there is decreased flexion in the lumbosacral spine without acute list or spasm. While seated ,  the Bilateral hip is examined there is full range of motion without obvious issue . On examination of the  Right knee :ROM is 0 to 125 The knee is in varus which corrects with valgus stress to some degree and There is a mild effusion. On examination of the  Left knee :ROM is 0 to 125 There is full range of motion without obvious instability. Patient is neurologically intact distally. Skin is intact. Imaging:   Radiographs: An AP standing, skiers, flexion lateral, and sunrise view of the right knee knee was obtained  This demonstrated  Severe joint space narrowing of the medial compartment with bone-on-bone articulation and Marked osteophyte formation in all 3 compartments. Radiographic impression: severe DJD right Knee    Assessment:   Degenerative joint disease of the right Knee. I did discuss with the patient the source of the pain and treatment options. We discussed nonsurgical measures including:Injection therapy, Bracing, Activity Modification, and Use of assistive device. We also discussed the definitive option of total Knee arthroplasty. I counseled the patient regarding the nature of the procedure the preoperative preparation necessary postop recovery and expected outcome. I counseled them regarding the risks of surgery including risk of infection, DVT formation, loss of motion, dislocation and stiffness. This patient has significant factors which may increase their surgical risk which include:, Significant previous cardiovascular history which may increase cardiovascular risk.   This will necessitate preoperative clearance via their cardiologist., and Chronic anticoagulation which will require more complex perioperative management, consultation with their primary provider, and increased risk for wound complications and bleeding issues. We discussed time return to work , general activity and long-term expectations. I described the [address]  program for the patient and expected time for hospitalization. This patient has good family support and meets criteria for consideration for the [place].    I would plan a Cementless Mobile Depuy Attune TKA. Velys Robot-Assisted. I discussed Robot-assisted surgery with the patient I discussed my implant selection process and rationale with the patient. Patient would like to consider options, if they would like to proceed with surgery they will let us know. If so, it will be a category 1 in technical complexity. This reflects my expectation for surgical time and difficulty but does not indicate the overall complexity of the patient's care. Plan:     Follow up:   Return for Surgery.      Colten Zamora MD

## 2023-02-20 NOTE — PROGRESS NOTES
[facility] CARDIOLOGY Follow Up                 Reason for Visit: Hypertension    Subjective:     Patient is a 74 y.o. male with a PMH of aortic stenosis status post Eliane TAVR (functional BAV), bilateral carotid atherosclerosis and hypertension who presents for follow-up. The patient was last seen in August 2022. An echocardiogram and LHC were ordered in the setting of aortic stenosis and CONTRERAS. The patient had a TTE in early September 2022 that was noted to demonstrate a low normal EF and severe AS. He had an LHC which was noted to demonstrate normal coronary arteries. Ultimately, the patient underwent TAVR in September 2022 with new onset LBBB. He had a TTE in November 2022 that was noted to demonstrate a low normal EF and mild MR. The prosthetic aortic valve was noted to be functioning normally. The patient denies chest pain and syncope. He reports that he was given the greenlight to stop Plavix in November 2022 by Dr. Nohemi Garcia, so he stopped it then. He reports occasional dyspnea that is chronic. He also reports that he has occasional stools that are \"black\". The patient reports that he is scheduled for right knee surgery on May 9. Past Medical History:   Diagnosis Date    History of PFTs 09/06/2022    History of transesophageal echocardiography (ZURDO) 09/02/2022    EF of 50 - 55%;  Moderate regurgitation mitral & tricuspid; severe aortic stenosis    Hypertension     Neck injury 01/25/2022    fractured C2; reports neck brace and no surgery    Seizures ([facility] [address] 1995      Past Surgical History:   Procedure Laterality Date    ABDOMEN SURGERY      repaired chain saw laceration    CARDIAC PROCEDURE N/A 09/02/2022    Left heart cath / coronary angiography performed by Lieutenant Monika MD at [address] [facility] N/A 09/27/2022    Transcatheter aortic valve replacement performed by Radha Young MD at [facility] 09/02/2022    Angiography lower ext bilat performed by Mohit Burns MD at [facility] CARDIAC CATH LAB    ORTHOPEDIC SURGERY Left 2015    left tibia fx and kelsey placed    ORTHOPEDIC SURGERY Right     rotator cuff repair    LA ABDOMEN SURGERY PROC UNLISTED      SHOULDER SURGERY Left     shoulder fracture - 2 screws placed    UMBILICAL HERNIA REPAIR      double hernia repair      Family History   Problem Relation Age of Onset    Cancer Father     High Blood Pressure Paternal Uncle     Diabetes Paternal Uncle       Social History     Tobacco Use    Smoking status: Never    Smokeless tobacco: Former     Types: Chew     Quit date: 1980   Substance Use Topics    Alcohol use: No      Allergies   Allergen Reactions    Aspartame Other (See Comments)     seizures         ROS:  No obvious pertinent positives on review of systems except for what was outlined above.        Objective:       /74   Pulse 80   Ht 5' 7\" (1.702 m)   Wt 185 lb 3.2 oz (84 kg)   BMI 29.01 kg/m²     BP Readings from Last 3 Encounters:   02/21/23 136/74   11/14/22 126/70   11/14/22 (!) 142/64       Wt Readings from Last 3 Encounters:   02/21/23 185 lb 3.2 oz (84 kg)   02/17/23 188 lb 0.2 oz (85.3 kg)   02/01/23 180 lb 12.8 oz (82 kg)       General/Constitutional:   Alert and oriented x 3, no acute distress  HEENT:   normocephalic, atraumatic, moist mucous membranes  Neck:   No JVD or carotid bruits bilaterally  Cardiovascular:   regular rate and rhythm, no rub/gallop appreciated  Pulmonary:   clear to auscultation bilaterally, no respiratory distress  Abdomen:   soft, non-tender, non-distended  Ext:   No sig LE edema bilaterally  Skin:  warm and dry, no obvious rashes seen  Neuro:   no obvious sensory or motor deficits  Psychiatric:   normal mood and affect    Data Review:   No results found for: CHOL  No results found for: TRIG  No results found for: HDL  No results found for: LDLCHOLESTEROL, LDLCALC  No results found for: LABVLDL, VLDL  No results found for: [facility]     Lab Results   Component Value Date/Time     11/11/2022 10:50 AM     09/28/2022 03:06 AM     09/26/2022 08:27 AM    K 4.2 11/11/2022 10:50 AM    K 3.7 09/28/2022 03:06 AM    K 4.1 09/26/2022 08:27 AM     11/11/2022 10:50 AM     09/28/2022 03:06 AM     09/26/2022 08:27 AM    CO2 31 11/11/2022 10:50 AM    CO2 27 09/28/2022 03:06 AM    CO2 27 09/26/2022 08:27 AM    BUN 14 11/11/2022 10:50 AM    BUN 11 09/28/2022 03:06 AM    BUN 13 09/26/2022 08:27 AM    CREATININE 1.00 11/11/2022 10:50 AM    CREATININE 0.80 09/28/2022 03:06 AM    CREATININE 0.74 09/26/2022 08:27 AM    GLUCOSE 97 11/11/2022 10:50 AM    GLUCOSE 89 09/28/2022 03:06 AM    GLUCOSE 106 09/26/2022 08:27 AM    CALCIUM 9.6 11/11/2022 10:50 AM    CALCIUM 8.3 09/28/2022 03:06 AM    CALCIUM 8.6 09/26/2022 08:27 AM         Lab Results   Component Value Date    ALT 28 11/11/2022    ALT 22 09/26/2022    ALT 22 09/01/2020    AST 14 (L) 11/11/2022    AST 14 (L) 09/26/2022    AST 13 (L) 09/01/2020        Assessment/Plan:   1. History of transcatheter aortic valve replacement (TAVR)  - TTE in November 2022 noted a normal functioning prosthetic aortic valve  - Patient with LBBB without syncope  - Follow up with Dr. Laurent Eduardo in May 2023  - Currently on baby aspirin daily    2. Carotid atherosclerosis, bilateral  - History of intermittent alkaline phosphatase elevation  - Will withhold statin therapy for now    3. Hypertension, unspecified type  - Well-controlled  - Continue with amlodipine  - Currently on losartan    4. Dark stools  - Pertinent positives include SAPT in the setting of TAVR   - Refer to GI    5. Chronic dyspnea  - [facility] in September 2022 noted normal coronary arteries  - TTE in November 2022 noted a low normal EF and a normal functioning prosthetic aortic valve    6.  Preoperative cardiovascular examination  - The patient is not having ACS  - RCRI equals 1: 0.9% rate of MACE  - According to the [facility] guidelines, if the estimated perioperative risk of MACE is low (<1%), no further testing is recommended prior to noncardiac surgery  - This patient has an estimated perioperative risk of MACE that is low risk; thus, no further cardiac testing is recommended prior to noncardiac surgery    F/U: 6 months    Roland Hester MD

## 2023-02-21 ENCOUNTER — OFFICE VISIT (OUTPATIENT)
Dept: CARDIOLOGY CLINIC | Age: 73
End: 2023-02-21
Payer: OTHER GOVERNMENT

## 2023-02-21 VITALS
SYSTOLIC BLOOD PRESSURE: 136 MMHG | HEART RATE: 80 BPM | HEIGHT: 67 IN | WEIGHT: 185.2 LBS | DIASTOLIC BLOOD PRESSURE: 74 MMHG | BODY MASS INDEX: 29.07 KG/M2

## 2023-02-21 DIAGNOSIS — R06.09 CHRONIC DYSPNEA: ICD-10-CM

## 2023-02-21 DIAGNOSIS — I65.23 CAROTID ATHEROSCLEROSIS, BILATERAL: ICD-10-CM

## 2023-02-21 DIAGNOSIS — I10 HYPERTENSION, UNSPECIFIED TYPE: ICD-10-CM

## 2023-02-21 DIAGNOSIS — R19.5 DARK STOOLS: ICD-10-CM

## 2023-02-21 DIAGNOSIS — Z01.810 PREOPERATIVE CARDIOVASCULAR EXAMINATION: ICD-10-CM

## 2023-02-21 DIAGNOSIS — Z95.2 HISTORY OF TRANSCATHETER AORTIC VALVE REPLACEMENT (TAVR): Primary | ICD-10-CM

## 2023-02-21 PROCEDURE — 99214 OFFICE O/P EST MOD 30 MIN: CPT | Performed by: INTERNAL MEDICINE

## 2023-02-21 PROCEDURE — 1123F ACP DISCUSS/DSCN MKR DOCD: CPT | Performed by: INTERNAL MEDICINE

## 2023-02-21 PROCEDURE — 3075F SYST BP GE 130 - 139MM HG: CPT | Performed by: INTERNAL MEDICINE

## 2023-02-21 PROCEDURE — 3078F DIAST BP <80 MM HG: CPT | Performed by: INTERNAL MEDICINE

## 2023-02-23 ENCOUNTER — TELEPHONE (OUTPATIENT)
Dept: ORTHOPEDIC SURGERY | Age: 73
End: 2023-02-23

## 2023-03-09 DIAGNOSIS — M17.11 PRIMARY OSTEOARTHRITIS OF RIGHT KNEE: Primary | ICD-10-CM

## 2023-03-23 PROBLEM — Z01.810 PREOPERATIVE CARDIOVASCULAR EXAMINATION: Status: RESOLVED | Noted: 2023-02-21 | Resolved: 2023-03-23

## 2023-04-18 ENCOUNTER — HOSPITAL ENCOUNTER (OUTPATIENT)
Dept: SURGERY | Age: 73
Discharge: HOME | End: 2023-04-21
Payer: MEDICARE

## 2023-04-18 ENCOUNTER — HOSPITAL ENCOUNTER (OUTPATIENT)
Dept: REHABILITATION | Age: 73
Discharge: HOME | End: 2023-04-21
Payer: MEDICARE

## 2023-04-18 VITALS
BODY MASS INDEX: 29.27 KG/M2 | HEART RATE: 68 BPM | WEIGHT: 182.1 LBS | TEMPERATURE: 98.2 F | SYSTOLIC BLOOD PRESSURE: 148 MMHG | DIASTOLIC BLOOD PRESSURE: 74 MMHG | RESPIRATION RATE: 18 BRPM | OXYGEN SATURATION: 97 % | HEIGHT: 66 IN

## 2023-04-18 DIAGNOSIS — R06.83 SNORING: Primary | ICD-10-CM

## 2023-04-18 DIAGNOSIS — Z96.651 STATUS POST RIGHT KNEE REPLACEMENT: ICD-10-CM

## 2023-04-18 LAB
ANION GAP SERPL CALC-SCNC: 2 MMOL/L (ref 2–11)
BACTERIA SPEC CULT: NORMAL
BUN SERPL-MCNC: 20 MG/DL (ref 8–23)
CALCIUM SERPL-MCNC: 8.9 MG/DL (ref 8.3–10.4)
CHLORIDE SERPL-SCNC: 106 MMOL/L (ref 101–110)
CO2 SERPL-SCNC: 29 MMOL/L (ref 21–32)
CREAT SERPL-MCNC: 0.85 MG/DL (ref 0.8–1.5)
ERYTHROCYTE [DISTWIDTH] IN BLOOD BY AUTOMATED COUNT: 13.2 % (ref 11.9–14.6)
GLUCOSE SERPL-MCNC: 93 MG/DL (ref 65–100)
HCT VFR BLD AUTO: 40.7 % (ref 41.1–50.3)
HGB BLD-MCNC: 13.6 G/DL (ref 13.6–17.2)
MCH RBC QN AUTO: 32 PG (ref 26.1–32.9)
MCHC RBC AUTO-ENTMCNC: 33.4 G/DL (ref 31.4–35)
MCV RBC AUTO: 95.8 FL (ref 82–102)
NRBC # BLD: 0 K/UL (ref 0–0.2)
PLATELET # BLD AUTO: 268 K/UL (ref 150–450)
PMV BLD AUTO: 9.4 FL (ref 9.4–12.3)
POTASSIUM SERPL-SCNC: 4.2 MMOL/L (ref 3.5–5.1)
RBC # BLD AUTO: 4.25 M/UL (ref 4.23–5.6)
SERVICE CMNT-IMP: NORMAL
SODIUM SERPL-SCNC: 137 MMOL/L (ref 133–143)
WBC # BLD AUTO: 4.8 K/UL (ref 4.3–11.1)

## 2023-04-18 PROCEDURE — 87641 MR-STAPH DNA AMP PROBE: CPT

## 2023-04-18 PROCEDURE — 85027 COMPLETE CBC AUTOMATED: CPT

## 2023-04-18 PROCEDURE — 97161 PT EVAL LOW COMPLEX 20 MIN: CPT

## 2023-04-18 PROCEDURE — 94760 N-INVAS EAR/PLS OXIMETRY 1: CPT

## 2023-04-18 PROCEDURE — 80048 BASIC METABOLIC PNL TOTAL CA: CPT

## 2023-04-18 RX ORDER — TAMSULOSIN HYDROCHLORIDE 0.4 MG/1
0.4 CAPSULE ORAL 2 TIMES DAILY
COMMUNITY

## 2023-04-18 ASSESSMENT — PAIN DESCRIPTION - LOCATION
LOCATION: KNEE
LOCATION: KNEE

## 2023-04-18 ASSESSMENT — PAIN DESCRIPTION - ORIENTATION
ORIENTATION: RIGHT;ANTERIOR;INNER;OUTER
ORIENTATION: RIGHT

## 2023-04-18 ASSESSMENT — KOOS JR
GOING UP OR DOWN STAIRS: 1
STANDING UPRIGHT: 1
STRAIGHTENING KNEE FULLY: 1
BENDING TO THE FLOOR TO PICK UP OBJECT: 1
TWISING OR PIVOTING ON KNEE: 1
KOOS JR TOTAL INTERVAL SCORE: 68.284
HOW SEVERE IS YOUR KNEE STIFFNESS AFTER FIRST WAKING IN MORNING: 1
RISING FROM SITTING: 1

## 2023-04-18 ASSESSMENT — PULMONARY FUNCTION TESTS
FEV1 (%PREDICTED): 77
FEV1 (LITERS): 2.08

## 2023-04-18 ASSESSMENT — PAIN SCALES - GENERAL
PAINLEVEL_OUTOF10: 1
PAINLEVEL_OUTOF10: 5

## 2023-04-18 ASSESSMENT — PAIN DESCRIPTION - DESCRIPTORS
DESCRIPTORS: ACHING;DULL
DESCRIPTORS: DULL

## 2023-04-18 ASSESSMENT — PAIN - FUNCTIONAL ASSESSMENT: PAIN_FUNCTIONAL_ASSESSMENT: ACTIVITIES ARE NOT PREVENTED

## 2023-04-18 NOTE — PROGRESS NOTES
CGA=Contact Guard Assistance,   Min=Minimal Assistance, Mod=Moderate Assistance, Max=Maximal Assistance, Total=Total Assistance, NT=Not Tested    TREATMENT:   EVALUATION: LOW COMPLEXITY: (Untimed Charge)    TREATMENT PLAN:   Effective Dates: 4/18/2023 TO 4/18/2023. Treatment/Session Assessment:  Patient was instructed in PT- HEP to increase strength and ROM in LEs. Answered all questions. Frequency/Duration: Patient to continue to perform home exercise program at least twice per day up until his surgery. EDUCATION: Education Given To: Patient, Family  Education Provided: Role of Therapy, Plan of Care, Home Exercise Program, Precautions  Education Method: Demonstration, Verbal, Teach Back, Printed Information/Hand-outs  Education Outcome: Verbalized understanding, Demonstrated understanding    MEDICAL NECESSITY: Mr. Jude Shabazz is expected to optimize hislower extremity strength and ROM in preparation for joint replacement surgery. REASON FOR CONTINUED SERVICES: Achieve baseline assesment of musculoskeletal system, functional mobility and home environment. , educate in PT HEP in preparation for surgery, educate in hospital plan of care. COMPLIANCE WITH PROGRAM/EXERCISE: compliant most of the time. TOTAL TREATMENT DURATION:  Time In: 7469  Time Out: 1300  Minutes: 15    Regarding Zaki Mcdonald's therapy, I certify that the treatment plan above will be carried out by a therapist or under their direction.   Thank you for this referral,  Graham Bermudez, PT

## 2023-04-18 NOTE — CARE COORDINATION
[facility] Case Management note:  Patient screened in Prehab for discharge planning needs. Patient scheduled for a future total joint replacement. We discussed Home Health and equipment needed after surgery. List of Home Health providers offered. Patient w/o preference towards provider. We will arrange [facility] on the day of surgery. Denies any equipment needs as he has a walker and 3-1 BSC.

## 2023-04-18 NOTE — PROGRESS NOTES
Total Joint Surgery Preoperative Chart Review      Patient ID:  Esvin Valverde  [id number]  16 y.o.  1950  Surgeon: Dr. Siddhartha Witt  Date of Surgery: 5/9/2023  Procedure: Total Right Knee Arthroplasty  Primary Care Physician: NOT ON FILE, MD None  Specialty Physician(s):      Subjective:   Esvin Valverde is a 74 y.o. White (non-) male who presents for preoperative evaluation for Total Right Knee arthroplasty. This is a preoperative chart review note based on data collected by the nurse at the surgical Pre-Assessment visit. Past Medical History:   Diagnosis Date    Arthritis     Cancer ([facility] [address]     skin cancer basal cell    History of PFTs 09/06/2022    History of transesophageal echocardiography (ZURDO) 09/02/2022    EF of 50 - 55%;  Moderate regurgitation mitral & tricuspid; severe aortic stenosis    Hypertension     Neck injury 01/25/2022    fractured C2; reports neck brace and no surgery    Seizures ([facility] [address] 1995    Sleep apnea       Past Surgical History:   Procedure Laterality Date    ABDOMEN SURGERY      repaired chain saw laceration    CARDIAC PROCEDURE N/A 09/02/2022    Left heart cath / coronary angiography performed by Jimmy Aguilar MD at [address] [facility] N/A 09/27/2022    Transcatheter aortic valve replacement performed by Tricia Whaley MD at [facility] N/A 09/02/2022    Angiography lower ext bilat performed by Jimmy Aguilar MD at [facility] CATH LAB    ORTHOPEDIC SURGERY Left 2015    left tibia fx and kelsey placed    ORTHOPEDIC SURGERY Right     rotator cuff repair    VT UNLISTED PROCEDURE ABDOMEN PERITONEUM & OMENTUM      SHOULDER SURGERY Left     shoulder fracture - 2 screws placed    UMBILICAL HERNIA REPAIR      double hernia repair     Family History   Problem Relation Age of Onset    Cancer Father     High Blood Pressure Paternal Uncle     Diabetes Paternal Uncle       Social History     Tobacco Use    Smoking status:
History of transesophageal echocardiography (ZURDO) 09/02/2022    EF of 50 - 55%; Moderate regurgitation mitral & tricuspid; severe aortic stenosis    Hypertension     Neck injury 01/25/2022    fractured C2; reports neck brace and no surgery    Seizures ([facility] [address] 1995    Sleep apnea     HX OF FRACTURED RIBS, PNEUMOTHORAX WITH CHEST TUBES, SMALL PLEURAL EFFUSIONS   MICHAEL- NO CPAP    Respiratory history:                                 SOB  ON EXERTION                                    Respiratory meds:  DENIES    FAMILY PRESENT:             WIFE  PAST SLEEP STUDY:        YES                  OVER 20 YEARS AGO  HX OF MICHAEL:                        YES                    PT REFUSED CPAP  MICHAEL assessment:     DANGERS OF UNTREATED MICHAEL EXPLAINED TO PT.                                          SLEEPS ON SIDE       &      BACK         &       STOMACH  PHYSICAL EXAM   Body mass index is 29.39 kg/m². Vitals:    04/18/23 1400   BP:    Pulse: (P) 68   Resp:    Temp:    SpO2: (P) 97%     Neck circumference:  39.5    cm    Loud snoring:                                                 YES             Witnessed apnea or wakening gasping or choking:        DENIES         Awakens with headaches:                                               DENIES  Morning or daytime tiredness/ sleepiness:                                TIRED  Dry mouth or sore throat in morning:            YES                                              Moody stage:  4                                   SACS score:13  Stop Bang                                    PT NON-COMPLIANT with CPAP. Dangers of non-compliance with treatment of MICHAEL explained to pt. MICHAEL handouts given to pt. ALBUTEROL Q 6 PRN  CONT. SAT MONITOR HS after surgery. O2 @ 3 lpm NC HS   RESPIRATORY ASSESSMENT Q SHIFT. Referrals:  [facility]  Pt.  Phone Number:

## 2023-05-05 ENCOUNTER — OFFICE VISIT (OUTPATIENT)
Dept: ORTHOPEDIC SURGERY | Age: 73
End: 2023-05-05

## 2023-05-05 DIAGNOSIS — M17.11 PRIMARY OSTEOARTHRITIS OF RIGHT KNEE: Primary | ICD-10-CM

## 2023-05-05 RX ORDER — ASPIRIN 81 MG/1
81 TABLET ORAL 2 TIMES DAILY
Qty: 60 TABLET | Refills: 0 | Status: SHIPPED | OUTPATIENT
Start: 2023-05-05

## 2023-05-05 RX ORDER — CELECOXIB 200 MG/1
200 CAPSULE ORAL DAILY
Qty: 30 CAPSULE | Refills: 0 | Status: SHIPPED | OUTPATIENT
Start: 2023-05-05

## 2023-05-05 RX ORDER — METHOCARBAMOL 750 MG/1
TABLET, FILM COATED ORAL
Qty: 40 TABLET | Refills: 0 | Status: SHIPPED | OUTPATIENT
Start: 2023-05-05

## 2023-05-05 RX ORDER — ONDANSETRON 4 MG/1
4 TABLET, FILM COATED ORAL EVERY 6 HOURS PRN
Qty: 30 TABLET | Refills: 0 | Status: SHIPPED | OUTPATIENT
Start: 2023-05-05

## 2023-05-05 RX ORDER — OXYCODONE HYDROCHLORIDE 5 MG/1
5-10 TABLET ORAL EVERY 4 HOURS PRN
Qty: 60 TABLET | Refills: 0 | Status: SHIPPED | OUTPATIENT
Start: 2023-05-05 | End: 2023-05-12

## 2023-05-05 NOTE — PROGRESS NOTES
Name: Daniele Doss  YOB: 1950  Gender: male  MRN: [id number]    Pre-Op     CC: RIGHT KNEE PAIN       This patient comes in for pre-op exam prior to RIGHT TKA. The patient has been cleared preoperatively. I counseled the patient once again about the risks of infection, DVT formation, expected time of hospitalization, anticipated recovery time as well as rehab needs and expectations for recovery. The patient would like to proceed and we will do so as planned. The patient was provided with pain medications as well as DVT prophylaxis to have on hand postoperatively at the time of discharge from hospital. All pertinent questions asked by the patient were answered.     CHEPE Bhat

## 2023-05-09 ENCOUNTER — HOME HEALTH ADMISSION (OUTPATIENT)
Dept: HOME HEALTH SERVICES | Facility: HOME HEALTH | Age: 73
End: 2023-05-09
Payer: MEDICARE

## 2023-05-09 ENCOUNTER — ANESTHESIA (OUTPATIENT)
Dept: SURGERY | Age: 73
End: 2023-05-09
Payer: OTHER GOVERNMENT

## 2023-05-09 ENCOUNTER — ANESTHESIA EVENT (OUTPATIENT)
Dept: SURGERY | Age: 73
End: 2023-05-09
Payer: OTHER GOVERNMENT

## 2023-05-09 ENCOUNTER — HOSPITAL ENCOUNTER (OUTPATIENT)
Age: 73
Discharge: HOME HEALTH | End: 2023-05-09
Attending: ORTHOPAEDIC SURGERY | Admitting: ORTHOPAEDIC SURGERY
Payer: OTHER GOVERNMENT

## 2023-05-09 VITALS
WEIGHT: 185.1 LBS | BODY MASS INDEX: 29.75 KG/M2 | TEMPERATURE: 97.5 F | RESPIRATION RATE: 16 BRPM | HEART RATE: 73 BPM | DIASTOLIC BLOOD PRESSURE: 73 MMHG | HEIGHT: 66 IN | SYSTOLIC BLOOD PRESSURE: 141 MMHG | OXYGEN SATURATION: 91 %

## 2023-05-09 PROBLEM — M17.11 ARTHRITIS OF RIGHT KNEE: Status: ACTIVE | Noted: 2023-05-09

## 2023-05-09 PROCEDURE — 2720000010 HC SURG SUPPLY STERILE: Performed by: ORTHOPAEDIC SURGERY

## 2023-05-09 PROCEDURE — 2580000003 HC RX 258: Performed by: ANESTHESIOLOGY

## 2023-05-09 PROCEDURE — 6360000002 HC RX W HCPCS: Performed by: PHYSICIAN ASSISTANT

## 2023-05-09 PROCEDURE — C1776 JOINT DEVICE (IMPLANTABLE): HCPCS | Performed by: ORTHOPAEDIC SURGERY

## 2023-05-09 PROCEDURE — 6360000002 HC RX W HCPCS: Performed by: ANESTHESIOLOGY

## 2023-05-09 PROCEDURE — 97530 THERAPEUTIC ACTIVITIES: CPT

## 2023-05-09 PROCEDURE — 6370000000 HC RX 637 (ALT 250 FOR IP): Performed by: ANESTHESIOLOGY

## 2023-05-09 PROCEDURE — 64447 NJX AA&/STRD FEMORAL NRV IMG: CPT | Performed by: ANESTHESIOLOGY

## 2023-05-09 PROCEDURE — C1713 ANCHOR/SCREW BN/BN,TIS/BN: HCPCS | Performed by: ORTHOPAEDIC SURGERY

## 2023-05-09 PROCEDURE — 3600000015 HC SURGERY LEVEL 5 ADDTL 15MIN: Performed by: ORTHOPAEDIC SURGERY

## 2023-05-09 PROCEDURE — 97535 SELF CARE MNGMENT TRAINING: CPT

## 2023-05-09 PROCEDURE — 97161 PT EVAL LOW COMPLEX 20 MIN: CPT

## 2023-05-09 PROCEDURE — 6360000002 HC RX W HCPCS: Performed by: ORTHOPAEDIC SURGERY

## 2023-05-09 PROCEDURE — 2500000003 HC RX 250 WO HCPCS: Performed by: ANESTHESIOLOGY

## 2023-05-09 PROCEDURE — 6360000002 HC RX W HCPCS: Performed by: NURSE ANESTHETIST, CERTIFIED REGISTERED

## 2023-05-09 PROCEDURE — 7100000000 HC PACU RECOVERY - FIRST 15 MIN: Performed by: ORTHOPAEDIC SURGERY

## 2023-05-09 PROCEDURE — 3600000005 HC SURGERY LEVEL 5 BASE: Performed by: ORTHOPAEDIC SURGERY

## 2023-05-09 PROCEDURE — 2709999900 HC NON-CHARGEABLE SUPPLY: Performed by: ORTHOPAEDIC SURGERY

## 2023-05-09 PROCEDURE — 2500000003 HC RX 250 WO HCPCS: Performed by: ORTHOPAEDIC SURGERY

## 2023-05-09 PROCEDURE — 2500000003 HC RX 250 WO HCPCS: Performed by: NURSE ANESTHETIST, CERTIFIED REGISTERED

## 2023-05-09 PROCEDURE — 3700000001 HC ADD 15 MINUTES (ANESTHESIA): Performed by: ORTHOPAEDIC SURGERY

## 2023-05-09 PROCEDURE — 7100000001 HC PACU RECOVERY - ADDTL 15 MIN: Performed by: ORTHOPAEDIC SURGERY

## 2023-05-09 PROCEDURE — 3700000000 HC ANESTHESIA ATTENDED CARE: Performed by: ORTHOPAEDIC SURGERY

## 2023-05-09 PROCEDURE — 97165 OT EVAL LOW COMPLEX 30 MIN: CPT

## 2023-05-09 DEVICE — KNEE K2 TOT HEMI ADV CMTLS IMPL CAPPED SYNTHES: Type: IMPLANTABLE DEVICE | Status: FUNCTIONAL

## 2023-05-09 DEVICE — INSERT TIB SZ 6 THK6MM KNEE POST STBL ROT PLATFRM ATTUNE: Type: IMPLANTABLE DEVICE | Site: KNEE | Status: FUNCTIONAL

## 2023-05-09 DEVICE — ATTUNE RP TIB BASE SZ 8 POR: Type: IMPLANTABLE DEVICE | Site: KNEE | Status: FUNCTIONAL

## 2023-05-09 DEVICE — COMPONENT FEM PS 6 RT KNEE CMNTLS POROUS ATTUNE: Type: IMPLANTABLE DEVICE | Site: KNEE | Status: FUNCTIONAL

## 2023-05-09 RX ORDER — PROCHLORPERAZINE EDISYLATE 5 MG/ML
5 INJECTION INTRAMUSCULAR; INTRAVENOUS
Status: DISCONTINUED | OUTPATIENT
Start: 2023-05-09 | End: 2023-05-09 | Stop reason: HOSPADM

## 2023-05-09 RX ORDER — OXYCODONE HYDROCHLORIDE 5 MG/1
5 TABLET ORAL
Status: COMPLETED | OUTPATIENT
Start: 2023-05-09 | End: 2023-05-09

## 2023-05-09 RX ORDER — HALOPERIDOL 5 MG/ML
1 INJECTION INTRAMUSCULAR
Status: DISCONTINUED | OUTPATIENT
Start: 2023-05-09 | End: 2023-05-09 | Stop reason: HOSPADM

## 2023-05-09 RX ORDER — ACETAMINOPHEN 325 MG/1
650 TABLET ORAL EVERY 6 HOURS
Status: DISCONTINUED | OUTPATIENT
Start: 2023-05-09 | End: 2023-05-09 | Stop reason: HOSPADM

## 2023-05-09 RX ORDER — NALOXONE HYDROCHLORIDE 0.4 MG/ML
0.4 INJECTION, SOLUTION INTRAMUSCULAR; INTRAVENOUS; SUBCUTANEOUS PRN
Status: DISCONTINUED | OUTPATIENT
Start: 2023-05-09 | End: 2023-05-09 | Stop reason: HOSPADM

## 2023-05-09 RX ORDER — SODIUM CHLORIDE 9 MG/ML
INJECTION, SOLUTION INTRAVENOUS CONTINUOUS
Status: DISCONTINUED | OUTPATIENT
Start: 2023-05-09 | End: 2023-05-09 | Stop reason: HOSPADM

## 2023-05-09 RX ORDER — DIPHENHYDRAMINE HYDROCHLORIDE 50 MG/ML
25 INJECTION INTRAMUSCULAR; INTRAVENOUS EVERY 6 HOURS PRN
Status: DISCONTINUED | OUTPATIENT
Start: 2023-05-09 | End: 2023-05-09 | Stop reason: HOSPADM

## 2023-05-09 RX ORDER — OXCARBAZEPINE 150 MG/1
300 TABLET, FILM COATED ORAL 2 TIMES DAILY
Status: DISCONTINUED | OUTPATIENT
Start: 2023-05-09 | End: 2023-05-09 | Stop reason: HOSPADM

## 2023-05-09 RX ORDER — LIDOCAINE HYDROCHLORIDE 10 MG/ML
1 INJECTION, SOLUTION INFILTRATION; PERINEURAL
Status: COMPLETED | OUTPATIENT
Start: 2023-05-09 | End: 2023-05-09

## 2023-05-09 RX ORDER — TRANEXAMIC ACID 100 MG/ML
INJECTION, SOLUTION INTRAVENOUS PRN
Status: DISCONTINUED | OUTPATIENT
Start: 2023-05-09 | End: 2023-05-09 | Stop reason: ALTCHOICE

## 2023-05-09 RX ORDER — ROPIVACAINE HYDROCHLORIDE 2 MG/ML
INJECTION, SOLUTION EPIDURAL; INFILTRATION; PERINEURAL PRN
Status: DISCONTINUED | OUTPATIENT
Start: 2023-05-09 | End: 2023-05-09 | Stop reason: ALTCHOICE

## 2023-05-09 RX ORDER — IPRATROPIUM BROMIDE AND ALBUTEROL SULFATE 2.5; .5 MG/3ML; MG/3ML
1 SOLUTION RESPIRATORY (INHALATION)
Status: DISCONTINUED | OUTPATIENT
Start: 2023-05-09 | End: 2023-05-09 | Stop reason: HOSPADM

## 2023-05-09 RX ORDER — DONEPEZIL HYDROCHLORIDE 5 MG/1
5 TABLET, FILM COATED ORAL EVERY MORNING
Status: DISCONTINUED | OUTPATIENT
Start: 2023-05-10 | End: 2023-05-09 | Stop reason: HOSPADM

## 2023-05-09 RX ORDER — SODIUM CHLORIDE 9 MG/ML
INJECTION, SOLUTION INTRAVENOUS PRN
Status: DISCONTINUED | OUTPATIENT
Start: 2023-05-09 | End: 2023-05-09 | Stop reason: HOSPADM

## 2023-05-09 RX ORDER — ACETAMINOPHEN 500 MG
1000 TABLET ORAL ONCE
Status: COMPLETED | OUTPATIENT
Start: 2023-05-09 | End: 2023-05-09

## 2023-05-09 RX ORDER — SENNA AND DOCUSATE SODIUM 50; 8.6 MG/1; MG/1
1 TABLET, FILM COATED ORAL 2 TIMES DAILY
Status: DISCONTINUED | OUTPATIENT
Start: 2023-05-09 | End: 2023-05-09 | Stop reason: HOSPADM

## 2023-05-09 RX ORDER — PROPOFOL 10 MG/ML
INJECTION, EMULSION INTRAVENOUS PRN
Status: DISCONTINUED | OUTPATIENT
Start: 2023-05-09 | End: 2023-05-09 | Stop reason: SDUPTHER

## 2023-05-09 RX ORDER — DEXAMETHASONE SODIUM PHOSPHATE 10 MG/ML
10 INJECTION INTRAMUSCULAR; INTRAVENOUS ONCE
Status: DISCONTINUED | OUTPATIENT
Start: 2023-05-10 | End: 2023-05-09 | Stop reason: HOSPADM

## 2023-05-09 RX ORDER — OXYCODONE HYDROCHLORIDE 5 MG/1
5 TABLET ORAL EVERY 4 HOURS PRN
Status: DISCONTINUED | OUTPATIENT
Start: 2023-05-09 | End: 2023-05-09 | Stop reason: HOSPADM

## 2023-05-09 RX ORDER — SODIUM CHLORIDE 0.9 % (FLUSH) 0.9 %
5-40 SYRINGE (ML) INJECTION PRN
Status: DISCONTINUED | OUTPATIENT
Start: 2023-05-09 | End: 2023-05-09 | Stop reason: HOSPADM

## 2023-05-09 RX ORDER — ASPIRIN 81 MG/1
81 TABLET ORAL 2 TIMES DAILY
Status: DISCONTINUED | OUTPATIENT
Start: 2023-05-09 | End: 2023-05-09 | Stop reason: HOSPADM

## 2023-05-09 RX ORDER — KETOROLAC TROMETHAMINE 30 MG/ML
INJECTION, SOLUTION INTRAMUSCULAR; INTRAVENOUS PRN
Status: DISCONTINUED | OUTPATIENT
Start: 2023-05-09 | End: 2023-05-09 | Stop reason: ALTCHOICE

## 2023-05-09 RX ORDER — ONDANSETRON 2 MG/ML
4 INJECTION INTRAMUSCULAR; INTRAVENOUS EVERY 6 HOURS PRN
Status: DISCONTINUED | OUTPATIENT
Start: 2023-05-09 | End: 2023-05-09 | Stop reason: HOSPADM

## 2023-05-09 RX ORDER — DEXAMETHASONE SODIUM PHOSPHATE 10 MG/ML
INJECTION, SOLUTION INTRAMUSCULAR; INTRAVENOUS
Status: COMPLETED | OUTPATIENT
Start: 2023-05-09 | End: 2023-05-09

## 2023-05-09 RX ORDER — MIDAZOLAM HYDROCHLORIDE 2 MG/2ML
2 INJECTION, SOLUTION INTRAMUSCULAR; INTRAVENOUS
Status: COMPLETED | OUTPATIENT
Start: 2023-05-09 | End: 2023-05-09

## 2023-05-09 RX ORDER — TAMSULOSIN HYDROCHLORIDE 0.4 MG/1
0.4 CAPSULE ORAL 2 TIMES DAILY
Status: DISCONTINUED | OUTPATIENT
Start: 2023-05-09 | End: 2023-05-09 | Stop reason: HOSPADM

## 2023-05-09 RX ORDER — FENTANYL CITRATE 50 UG/ML
100 INJECTION, SOLUTION INTRAMUSCULAR; INTRAVENOUS
Status: COMPLETED | OUTPATIENT
Start: 2023-05-09 | End: 2023-05-09

## 2023-05-09 RX ORDER — ONDANSETRON 2 MG/ML
INJECTION INTRAMUSCULAR; INTRAVENOUS PRN
Status: DISCONTINUED | OUTPATIENT
Start: 2023-05-09 | End: 2023-05-09 | Stop reason: SDUPTHER

## 2023-05-09 RX ORDER — TRANEXAMIC ACID 100 MG/ML
INJECTION, SOLUTION INTRAVENOUS PRN
Status: DISCONTINUED | OUTPATIENT
Start: 2023-05-09 | End: 2023-05-09 | Stop reason: SDUPTHER

## 2023-05-09 RX ORDER — METHOCARBAMOL 750 MG/1
750 TABLET, FILM COATED ORAL 4 TIMES DAILY PRN
Status: DISCONTINUED | OUTPATIENT
Start: 2023-05-09 | End: 2023-05-09 | Stop reason: HOSPADM

## 2023-05-09 RX ORDER — LIDOCAINE HYDROCHLORIDE 20 MG/ML
INJECTION, SOLUTION EPIDURAL; INFILTRATION; INTRACAUDAL; PERINEURAL PRN
Status: DISCONTINUED | OUTPATIENT
Start: 2023-05-09 | End: 2023-05-09 | Stop reason: SDUPTHER

## 2023-05-09 RX ORDER — OXYCODONE HYDROCHLORIDE 5 MG/1
10 TABLET ORAL EVERY 4 HOURS PRN
Status: DISCONTINUED | OUTPATIENT
Start: 2023-05-09 | End: 2023-05-09 | Stop reason: HOSPADM

## 2023-05-09 RX ORDER — SODIUM CHLORIDE 0.9 % (FLUSH) 0.9 %
5-40 SYRINGE (ML) INJECTION EVERY 12 HOURS SCHEDULED
Status: DISCONTINUED | OUTPATIENT
Start: 2023-05-09 | End: 2023-05-09 | Stop reason: HOSPADM

## 2023-05-09 RX ORDER — VENLAFAXINE HYDROCHLORIDE 150 MG/1
150 CAPSULE, EXTENDED RELEASE ORAL DAILY
Status: DISCONTINUED | OUTPATIENT
Start: 2023-05-10 | End: 2023-05-09 | Stop reason: HOSPADM

## 2023-05-09 RX ORDER — PROMETHAZINE HYDROCHLORIDE 25 MG/1
25 TABLET ORAL EVERY 6 HOURS PRN
Status: DISCONTINUED | OUTPATIENT
Start: 2023-05-09 | End: 2023-05-09 | Stop reason: HOSPADM

## 2023-05-09 RX ORDER — DIPHENHYDRAMINE HCL 25 MG
25 CAPSULE ORAL EVERY 6 HOURS PRN
Status: DISCONTINUED | OUTPATIENT
Start: 2023-05-09 | End: 2023-05-09 | Stop reason: HOSPADM

## 2023-05-09 RX ORDER — SODIUM CHLORIDE, SODIUM LACTATE, POTASSIUM CHLORIDE, CALCIUM CHLORIDE 600; 310; 30; 20 MG/100ML; MG/100ML; MG/100ML; MG/100ML
INJECTION, SOLUTION INTRAVENOUS CONTINUOUS
Status: DISCONTINUED | OUTPATIENT
Start: 2023-05-09 | End: 2023-05-09

## 2023-05-09 RX ORDER — MAGNESIUM HYDROXIDE/ALUMINUM HYDROXICE/SIMETHICONE 120; 1200; 1200 MG/30ML; MG/30ML; MG/30ML
15 SUSPENSION ORAL EVERY 6 HOURS PRN
Status: DISCONTINUED | OUTPATIENT
Start: 2023-05-09 | End: 2023-05-09 | Stop reason: HOSPADM

## 2023-05-09 RX ORDER — CELECOXIB 200 MG/1
200 CAPSULE ORAL DAILY
Status: DISCONTINUED | OUTPATIENT
Start: 2023-05-10 | End: 2023-05-09 | Stop reason: HOSPADM

## 2023-05-09 RX ADMIN — DEXAMETHASONE SODIUM PHOSPHATE 4 MG: 10 INJECTION, SOLUTION INTRAMUSCULAR; INTRAVENOUS at 09:52

## 2023-05-09 RX ADMIN — SODIUM CHLORIDE, SODIUM LACTATE, POTASSIUM CHLORIDE, AND CALCIUM CHLORIDE: 600; 310; 30; 20 INJECTION, SOLUTION INTRAVENOUS at 08:37

## 2023-05-09 RX ADMIN — SODIUM CHLORIDE, SODIUM LACTATE, POTASSIUM CHLORIDE, AND CALCIUM CHLORIDE: 600; 310; 30; 20 INJECTION, SOLUTION INTRAVENOUS at 10:45

## 2023-05-09 RX ADMIN — ONDANSETRON 4 MG: 2 INJECTION INTRAMUSCULAR; INTRAVENOUS at 11:44

## 2023-05-09 RX ADMIN — ROPIVACAINE HYDROCHLORIDE 20 ML: 2 INJECTION, SOLUTION EPIDURAL; INFILTRATION at 09:52

## 2023-05-09 RX ADMIN — OXYCODONE 5 MG: 5 TABLET ORAL at 12:58

## 2023-05-09 RX ADMIN — HYDROMORPHONE HYDROCHLORIDE 0.5 MG: 1 INJECTION, SOLUTION INTRAMUSCULAR; INTRAVENOUS; SUBCUTANEOUS at 13:14

## 2023-05-09 RX ADMIN — FENTANYL CITRATE 50 MCG: 50 INJECTION INTRAMUSCULAR; INTRAVENOUS at 09:53

## 2023-05-09 RX ADMIN — HYDROMORPHONE HYDROCHLORIDE 0.5 MG: 1 INJECTION, SOLUTION INTRAMUSCULAR; INTRAVENOUS; SUBCUTANEOUS at 13:08

## 2023-05-09 RX ADMIN — MIDAZOLAM HYDROCHLORIDE 2 MG: 1 INJECTION, SOLUTION INTRAMUSCULAR; INTRAVENOUS at 09:53

## 2023-05-09 RX ADMIN — Medication 2000 MG: at 10:41

## 2023-05-09 RX ADMIN — PROPOFOL 100 MCG/KG/MIN: 10 INJECTION, EMULSION INTRAVENOUS at 10:36

## 2023-05-09 RX ADMIN — HYDROMORPHONE HYDROCHLORIDE 0.5 MG: 1 INJECTION, SOLUTION INTRAMUSCULAR; INTRAVENOUS; SUBCUTANEOUS at 13:20

## 2023-05-09 RX ADMIN — LIDOCAINE HYDROCHLORIDE 1 ML: 10 INJECTION, SOLUTION INFILTRATION; PERINEURAL at 08:38

## 2023-05-09 RX ADMIN — PROPOFOL 40 MG: 10 INJECTION, EMULSION INTRAVENOUS at 10:35

## 2023-05-09 RX ADMIN — ACETAMINOPHEN 1000 MG: 500 TABLET, FILM COATED ORAL at 08:21

## 2023-05-09 RX ADMIN — TRANEXAMIC ACID 1000 MG: 100 INJECTION, SOLUTION INTRAVENOUS at 10:44

## 2023-05-09 RX ADMIN — HYDROMORPHONE HYDROCHLORIDE 0.5 MG: 1 INJECTION, SOLUTION INTRAMUSCULAR; INTRAVENOUS; SUBCUTANEOUS at 13:03

## 2023-05-09 RX ADMIN — LIDOCAINE HYDROCHLORIDE 100 MG: 20 INJECTION, SOLUTION EPIDURAL; INFILTRATION; INTRACAUDAL; PERINEURAL at 10:35

## 2023-05-09 ASSESSMENT — KOOS JR
RISING FROM SITTING: 2
HOW SEVERE IS YOUR KNEE STIFFNESS AFTER FIRST WAKING IN MORNING: 2
BENDING TO THE FLOOR TO PICK UP OBJECT: 2
STANDING UPRIGHT: 2
KOOS JR TOTAL INTERVAL SCORE: 52.465
TWISING OR PIVOTING ON KNEE: 2
GOING UP OR DOWN STAIRS: 2
STRAIGHTENING KNEE FULLY: 2

## 2023-05-09 ASSESSMENT — PAIN SCALES - GENERAL
PAINLEVEL_OUTOF10: 6
PAINLEVEL_OUTOF10: 2
PAINLEVEL_OUTOF10: 0

## 2023-05-09 ASSESSMENT — PAIN DESCRIPTION - LOCATION: LOCATION: KNEE

## 2023-05-09 ASSESSMENT — ENCOUNTER SYMPTOMS: SHORTNESS OF BREATH: 1

## 2023-05-09 ASSESSMENT — PAIN - FUNCTIONAL ASSESSMENT: PAIN_FUNCTIONAL_ASSESSMENT: 0-10

## 2023-05-09 NOTE — PROGRESS NOTES
ACUTE PHYSICAL THERAPY GOALS:   (Developed with and agreed upon by patient and/or caregiver.)  GOALS (1-4 days):  (1.)Mr. Linda Sepulveda will move from supine to sit and sit to supine  in bed with INDEPENDENT. (2.)Mr. Linda Sepulveda will transfer from bed to chair and chair to bed with SUPERVISION using the least restrictive device. (3.)Mr. Linda Sepulveda will ambulate with SUPERVISION for 300 feet with the least restrictive device. (4.)Mr. Linda Sepulveda will ambulate up/down 5 steps with bilateral  railing with SUPERVISION. (5.)Mr. Linda Sepulveda will increase right knee ROM to 0-100°.  ________________________________________________________________________________________________            PHYSICAL THERAPY JOINT CAMP: TOTAL KNEE ARTHROPLASTY Initial Assessment and PM  (Link to Caseload Tracking: PT Visit Days : 1  Acknowledge Orders  Time In/Out  PT Charge Capture  Rehab Caseload Tracker  Episode   Georgia Driver is a 74 y.o. male   PRIMARY DIAGNOSIS: Osteoarthritis of right knee  Osteoarthritis of right knee [M17.11]  Arthritis of right knee [M17.11]  Procedure(s) (LRB):  KNEE TOTAL ARTHROPLASTY ROBOTIC-RIGHT VELYS SDD (Right)  Day of Surgery  Reason for Referral: Pain in Right Knee (M25.561)  Stiffness of Right Knee, Not elsewhere classified (M25.661)  Difficulty in walking, Not elsewhere classified (R26.2)  Outpatient in a bed: Payor: [facility] MEDICARE / Plan: Nathaniel Askew / Product Type: *No Product type* /     REHAB RECOMMENDATIONS:   Recommendation to date pending progress:  Setting:  Home Health Therapy    Equipment:    None     RANGE OF MOTION:   Will assess at next session     GAIT: I Mod I S SBA CGA Min Mod Max Total  NT x2 Comments:   Level of Assistance [] [] [] [x] [x] [] [] [] [] [] []            Weightbearing Status  Right Lower Extremity Weight Bearing: Weight Bearing As Tolerated    Distance  250 feet    Gait Quality Decreased elle  and Decreased step length    DME Rolling Walker     Stairs

## 2023-05-09 NOTE — PROGRESS NOTES
OCCUPATIONAL THERAPY Initial Assessment and PM      (Link to Caseload Tracking: OT Visit Days: 1  OT Orders   Time  OT Charge Capture  Rehab Caseload Tracker  Episode     Khari Gerber is a 74 y.o. male   PRIMARY DIAGNOSIS: Osteoarthritis of right knee  Osteoarthritis of right knee [M17.11]  Arthritis of right knee [M17.11]  Procedure(s) (LRB):  KNEE TOTAL ARTHROPLASTY ROBOTIC-RIGHT VELYS SDD (Right)  Day of Surgery  Reason for Referral: Pain in Right Knee (M25.561)  Stiffness of Right Knee, Not elsewhere classified (M25.661)  Other lack of cordination (R27.8)  Difficulty in walking, Not elsewhere classified (R26.2)  Other abnormalities of gait and mobility (R26.89)  Outpatient in a bed: Payor: [facility] MEDICARE / Plan: Chapin Patterson / Product Type: *No Product type* /     ASSESSMENT:     REHAB RECOMMENDATIONS:   Recommendation to date pending progress:  Setting:  Home Health Therapy    Equipment:    None     ASSESSMENT:  Mr. Wendel Kanner is s/p right TKA and presents with decreased independence with functional mobility and activities of daily living as compared to baseline level of function and safety. Patient would benefit from skilled Occupational Therapy to maximize independence and safety with self-care task and functional mobility. Patient in recliner s/p performing stairs with PT. He plans to discharge home today with good support from his wife and son. He dressed with SBA. He and his wife were present. Both educated on OT poc , post op showering and ADL task performance. He was educated on ice machine and home safety. All questions answered. Will follow.        [employer] AM-PAC 6 Clicks Daily Activity Inpatient Short Form:    AM-PAC Daily Activity - Inpatient   How much help is needed for putting on and taking off regular lower body clothing?: A Little  How much help is needed for bathing (which includes washing, rinsing, drying)?: A Little  How much help is needed for toileting

## 2023-05-09 NOTE — ANESTHESIA PRE PROCEDURE
Department of Anesthesiology  Preprocedure Note       Name:  Fidelia Phillips   Age:  74 y.o.  :  1950                                          MRN:  [id number]         Date:  2023      Surgeon: Fifi Tavera):  Luis A Finley MD    Procedure: Procedure(s):  KNEE TOTAL ARTHROPLASTY ROBOTIC-RIGHT VELYS SDD    Medications prior to admission:   Prior to Admission medications    Medication Sig Start Date End Date Taking? Authorizing Provider   oxyCODONE (ROXICODONE) 5 MG immediate release tablet Take 1-2 tablets by mouth every 4 hours as needed for Pain for up to 7 days. Max Daily Amount: 60 mg  Patient not taking: Reported on 2023  Luis A Finley MD   methocarbamol (ROBAXIN-750) 750 MG tablet Take 1 tablet by mouth every 6 hours as needed for spasms. Patient not taking: Reported on 2023   Luis A Finley MD   ondansetron [facility]) 4 MG tablet Take 1 tablet by mouth every 6 hours as needed for Nausea or Vomiting  Patient not taking: Reported on 2023   Luis A Finley MD   celecoxib (CELEBREX) 200 MG capsule Take 1 capsule by mouth daily  Patient not taking: Reported on 2023   Luis A Finley MD   aspirin EC 81 MG EC tablet Take 1 tablet by mouth in the morning and 1 tablet in the evening.   Patient not taking: Reported on 2023   Luis A Finley MD   tamsulosin (FLOMAX) 0.4 MG capsule Take 1 capsule by mouth in the morning and at bedtime Indications: evening dose at 6 pm    Historical Provider, MD   Multiple Vitamins-Minerals (PRESERVISION AREDS 2 PO) Take 1 tablet by mouth in the morning and at bedtime    Historical Provider, MD   aspirin 81 MG chewable tablet Take 1 tablet by mouth daily 22   Bo Kenyon, APRN - CNP   venlafaxine (EFFEXOR XR) 150 MG extended release capsule Take 1 capsule by mouth daily    Historical Provider, MD   finasteride (PROSCAR) 5 MG tablet Take 1 tablet by mouth daily    Historical

## 2023-05-09 NOTE — PERIOP NOTE
TRANSFER - OUT REPORT:    Verbal report given to mann bonner on Kajal Barragan  being transferred to [facility] for routine progression of patient care       Report consisted of patient's Situation, Background, Assessment and   Recommendations(SBAR). Information from the following report(s) Nurse Handoff Report, Adult Overview, Surgery Report, MAR, and Cardiac Rhythm SR  was reviewed with the receiving nurse. [place] Assessment: No data recorded  Lines:   Peripheral IV 05/09/23 Left Wrist (Active)   Site Assessment Clean, dry & intact 05/09/23 1220   Line Status Infusing 05/09/23 1220   Line Care Connections checked and tightened 05/09/23 1220   Phlebitis Assessment No symptoms 05/09/23 1220   Infiltration Assessment 0 05/09/23 1220   Alcohol Cap Used No 05/09/23 1220   Dressing Status Clean, dry & intact 05/09/23 1220   Dressing Type Transparent 05/09/23 1220        Opportunity for questions and clarification was provided.       Patient transported with:  O2 @ 0lpm

## 2023-05-09 NOTE — INTERVAL H&P NOTE
Update History & Physical    The patient's History and Physical of May 5, 23 was reviewed with the patient and I examined the patient. There was no change. The surgical site was confirmed by the patient and me. Plan: The risks, benefits, expected outcome, and alternative to the recommended procedure have been discussed with the patient. Patient understands and wants to proceed with the procedure.      Electronically signed by CHEPE Carey on 5/9/2023 at 8:49 AM

## 2023-05-09 NOTE — DISCHARGE SUMMARY
medications which have NOT CHANGED    Details   oxyCODONE (ROXICODONE) 5 MG immediate release tablet Take 1-2 tablets by mouth every 4 hours as needed for Pain for up to 7 days. Max Daily Amount: 60 mg  Qty: 60 tablet, Refills: 0    Comments: Reduce doses taken as pain becomes manageable  Associated Diagnoses: Primary osteoarthritis of right knee      methocarbamol (ROBAXIN-750) 750 MG tablet Take 1 tablet by mouth every 6 hours as needed for spasms. Qty: 40 tablet, Refills: 0    Associated Diagnoses: Primary osteoarthritis of right knee      ondansetron (ZOFRAN) 4 MG tablet Take 1 tablet by mouth every 6 hours as needed for Nausea or Vomiting  Qty: 30 tablet, Refills: 0    Associated Diagnoses: Primary osteoarthritis of right knee      celecoxib (CELEBREX) 200 MG capsule Take 1 capsule by mouth daily  Qty: 30 capsule, Refills: 0    Associated Diagnoses: Primary osteoarthritis of right knee      aspirin EC 81 MG EC tablet Take 1 tablet by mouth in the morning and 1 tablet in the evening.   Qty: 60 tablet, Refills: 0    Associated Diagnoses: Primary osteoarthritis of right knee      tamsulosin (FLOMAX) 0.4 MG capsule Take 1 capsule by mouth in the morning and at bedtime Indications: evening dose at 6 pm      venlafaxine (EFFEXOR XR) 150 MG extended release capsule Take 1 capsule by mouth daily      donepezil (ARICEPT) 10 MG tablet Take 0.5 tablets by mouth every morning      OXcarbazepine (TRILEPTAL) 300 MG tablet Take 1 tablet by mouth 2 times daily      Ascorbic Acid (VITAMIN C PO) Take by mouth daily           STOP taking these medications       Multiple Vitamins-Minerals (PRESERVISION AREDS 2 PO) Comments:   Reason for Stopping:         aspirin 81 MG chewable tablet Comments:   Reason for Stopping:         finasteride (PROSCAR) 5 MG tablet Comments:   Reason for Stopping:         amLODIPine (NORVASC) 10 MG tablet Comments:   Reason for Stopping:         losartan (COZAAR) 50 MG tablet Comments:   Reason for

## 2023-05-09 NOTE — DISCHARGE INSTRUCTIONS
clothing. You may shower 24 to 48 hours after surgery. Pat the incision dry. Don't swim or take a bath for the first 2 weeks, or until your doctor tells you it is okay. Exercise    Your rehab program will give you a number of exercises to do to help you get back your knee's range of motion and strength. Always do them as your therapist tells you. Ice    For pain and swelling, put ice or a cold pack on the area for 10 to 20 minutes at a time. Put a thin cloth between the ice and your skin. If your doctor recommended cold therapy using a portable machine, follow the instructions that came with the machine. Other instructions    Wear compression stockings if your doctor told you to. These may help to prevent blood clots. Your doctor will tell you how long you need to keep wearing the compression stockings. Carry a medical alert card that says you have an artificial joint. You have metal pieces in your knee. These may set off some airport metal detectors. Follow-up care is a key part of your treatment and safety. Be sure to make and go to all appointments, and call your doctor if you are having problems. It's also a good idea to know your test results and keep a list of the medicines you take. When should you call for help? Call 911 anytime you think you may need emergency care. For example, call if:    You passed out (lost consciousness). You have severe trouble breathing. You have sudden chest pain and shortness of breath, or you cough up blood. Call your doctor now or seek immediate medical care if:    You have signs of infection, such as: Increased pain, swelling, warmth, or redness. Red streaks leading from the incision. Pus draining from the incision. A fever. You have signs of a blood clot, such as:  Pain in your calf, back of the knee, thigh, or groin. Redness and swelling in your leg or groin. Your incision comes open and begins to bleed, or the bleeding increases.      You

## 2023-05-09 NOTE — ANESTHESIA PROCEDURE NOTES
Peripheral Block    Patient location during procedure: pre-op  Reason for block: post-op pain management and at surgeon's request  Start time: 5/9/2023 9:52 AM  End time: 5/9/2023 9:54 AM  Staffing  Performed: anesthesiologist   Anesthesiologist: Lesvia Alaniz MD  Preanesthetic Checklist  Completed: patient identified, IV checked, site marked, risks and benefits discussed, surgical/procedural consents, equipment checked, pre-op evaluation, timeout performed, anesthesia consent given, oxygen available and monitors applied/VS acknowledged  Peripheral Block   Patient position: supine  Prep: ChloraPrep  Provider prep: mask  Patient monitoring: cardiac monitor, continuous pulse ox, frequent blood pressure checks, IV access and oxygen  Block type: Femoral  Adductor canal  Laterality: right  Injection technique: single-shot  Guidance: ultrasound guided  Local infiltration: ropivacaine  Local infiltration: ropivacaine    Needle   Needle type: insulated echogenic nerve stimulator needle   Needle gauge: 21 G  Needle localization: ultrasound guidance  Needle length: 10 cm  Assessment   Injection assessment: negative aspiration for heme, no paresthesia on injection, local visualized surrounding nerve on ultrasound and no intravascular symptoms  Paresthesia pain: none  Slow fractionated injection: yes  Hemodynamics: stable  Real-time US image taken/store: yes  Outcomes: patient tolerated procedure well and uncomplicated    Additional Notes  -Block placed for post op pain at surgeon's request.     -Ultrasound used to identify anatomy of nerve bundle. -Needle placement and local injection at perineural area confirmed with real time ultrasound guidance.     -Local visualized with ultrasound surrounding nerve. -Permanent Image taken and placed on chart.       Medications Administered  dexamethasone (DECADRON) (PF) 10 mg/mL injection - Other   4 mg - 5/9/2023 9:52:00 AM  ropivacaine 0.2% with epinephrine 1:200,000 injection

## 2023-05-09 NOTE — OP NOTE
Velia [facility]lesby 8 Cementless Total Knee Arthroplasty - PS  Patient:Nicholas Sahu   : 1950  Medical Record [id number]  Pre-operative Diagnosis:  Osteoarthritis of right knee [M17.11]  Arthritis of right knee [M17.11]  Post-operative Diagnosis: Osteoarthritis of right knee [M17.11]  Arthritis of right knee [M17.11]    Surgeon: Patricia Molina MD  Assistant: Bridgette Coley PA-C    This surgical assistant was present for the procedure and vital for the performance of the procedure. He assisted with exposure and retraction during the procedure as well as wound closure and dressing application after the procedure. Anesthesia: Spinal    Procedure: Total Knee Arthroplasty   The complexity of the total joint surgery requires the use of a first assistant for positioning, retraction and assistance in closure. The patient's Body mass index is 29.88 kg/m²., BMI's greater then 35 make surgical exposure and retraction extremely difficult and increase operative time. Tourniquet Time: none  EBL: 150cc  Additional Findings: Severe DJD  Releases none    Kajal Binning was brought to the operating room and positioned on the operating table. He was anethestized  IV antibiotics were administered per CMS protocol. Prior to the incision being made a timeout was called identifying the patient, procedure ,operative side and surgeon. The right leg was prepped and draped in the usual sterile manner  An anterior longitudinal incision was accomplished just medial to the tibial tubercle and extending approximal 6 centimeters proximal to the superior pole of the patella. A medial parapatellar capsular incision was performed. The medial capsular flap was elevated around to the insertion of the semimembranous tendon. The patella was everted and the knee flexed and externally rotated. The medial and lateral menisci were excised.   The lateral half of the fat pad excised and

## 2023-05-09 NOTE — ANESTHESIA POSTPROCEDURE EVALUATION
Department of Anesthesiology  Postprocedure Note    Patient: Jonna Baeza  MRN: [id number]  YOB: 1950  Date of evaluation: 5/9/2023      Procedure Summary     Date: 05/09/23 Room [facility]    Anesthesia Start: 1014 Anesthesia Stop: 9056    Procedure: KNEE TOTAL ARTHROPLASTY ROBOTIC-RIGHT VELYS SDD (Right: Knee) Diagnosis:       Osteoarthritis of right knee      (Osteoarthritis of right knee [M17.11])    Surgeons: Mary Kelly MD Responsible Provider: Zeb Cruz MD    Anesthesia Type: Spinal ASA Status: 4          Anesthesia Type: Spinal    Kristina Phase I: Kristina Score: 8    Kristina Phase II:        Anesthesia Post Evaluation    Patient location during evaluation: PACU  Patient participation: complete - patient participated  Level of consciousness: awake  Airway patency: patent  Nausea & Vomiting: no nausea  Complications: no  Cardiovascular status: hemodynamically stable  Respiratory status: acceptable and nonlabored ventilation  Hydration status: stable  Multimodal analgesia pain management approach

## 2023-05-09 NOTE — PERIOP NOTE
MD Trent Garza at bedside with patient. Pt VSS stable. Pain and Nausea controlled at this time. Verbal sign out per MD when pacu care is completed. Plan of care continues.

## 2023-05-09 NOTE — RT PROTOCOL NOTE
Protocols: A. Aerosolized Medication Protocol  Bronchial Hygiene   Oxygen Protocol   [facility] Fast Track Weaning Protocol  Asthma Treatment protocol ER  Alpha-1 antitrypsin Deficiency Protocol  Prone Positioning Protocol  Respiratory Care COPD Protocol  Home Oxygen assessment Protocol  Ventilator Weaning Protocols   Volume Expansion protocol    Indications      Frequency          Level  A. Aerosol therapy   1.  q4h     Severe SOB, wheezing, unable to sleep 1   2.  qid, q4 wa or q6h   Moderate SOB, wheezing   2   3.  tid      Hx of asthma, or COPD mild wheezing,         or facilitate secretion removal              3   4.  bid      Asthma, or COPD, Intermittent wheezing 4   5. PRN, i.e. tid PRN, qid PRN Asthma, or COPD, occasional wheezing 5       B. Bronchopulmonary Hygiene    1. q4h             Copious secretions, SOB, unable to sleep 1   2. qid & PRN            Moderate amounts of secretions   2   3. tid           Small amounts of secretions and poor cough,               history of secretions    3    4. PRN, i.e. tid PRN, qid PRN     Breathing exercises, encourage cough only 4      C. Oxygen Therapy     Follow hospital approved Oxygen Protocol      Note:  qid treatments are due 0800, 1200, 1600, and 2000. tid treatments are due 0800, 1400, and 2000  Q6h treatments are due 0800, 1400, 2000, 0200  Q4 wa teatments are due 0800, 1200, 1600, and 2000. Q4h treatments are due  0800, 1200, 1600, 2000, 0000, and 0400. The Level 1-5 rating system is only to be used as criteria for determining appropriate frequency of therapy. References:   N   Joint Commission Consolidated Steve Standard   L    Respiratory Care Department Policy, Procedure and Protocol Guideline Manual, 1995, MARSHAL Acuña. L  Therapist Driven Respiratory Care Protocols - A Practitioner's Guide for Criteria-Based Respiratory Care by Elan Avery M.D., and MARSHAL Morales, DAVID. L  The rationale for therapist-driven protocols: an update.  Respiratory Care

## 2023-05-10 ENCOUNTER — HOME CARE VISIT (OUTPATIENT)
Dept: SCHEDULING | Facility: HOME HEALTH | Age: 73
End: 2023-05-10
Payer: MEDICARE

## 2023-05-10 ENCOUNTER — TELEPHONE (OUTPATIENT)
Dept: ORTHOPEDICS UNIT | Age: 73
End: 2023-05-10

## 2023-05-10 VITALS
SYSTOLIC BLOOD PRESSURE: 130 MMHG | OXYGEN SATURATION: 94 % | RESPIRATION RATE: 16 BRPM | DIASTOLIC BLOOD PRESSURE: 60 MMHG | TEMPERATURE: 98.7 F | HEART RATE: 72 BPM

## 2023-05-10 PROCEDURE — G0151 HHCP-SERV OF PT,EA 15 MIN: HCPCS

## 2023-05-10 ASSESSMENT — ENCOUNTER SYMPTOMS
HEMOPTYSIS: 0
PAIN LOCATION - PAIN QUALITY: ACHY

## 2023-05-10 NOTE — TELEPHONE ENCOUNTER
Called to follow up after TKA with SDD on 5/9/23. No answer. VM left with contact number for ortho navigator.

## 2023-05-12 ENCOUNTER — HOME CARE VISIT (OUTPATIENT)
Dept: SCHEDULING | Facility: HOME HEALTH | Age: 73
End: 2023-05-12
Payer: MEDICARE

## 2023-05-12 VITALS
DIASTOLIC BLOOD PRESSURE: 70 MMHG | RESPIRATION RATE: 18 BRPM | HEART RATE: 80 BPM | TEMPERATURE: 97.5 F | OXYGEN SATURATION: 96 % | SYSTOLIC BLOOD PRESSURE: 126 MMHG

## 2023-05-12 PROCEDURE — G0157 HHC PT ASSISTANT EA 15: HCPCS

## 2023-05-12 ASSESSMENT — ENCOUNTER SYMPTOMS: PAIN LOCATION - PAIN QUALITY: ACHING, BURNING

## 2023-05-15 ENCOUNTER — HOME CARE VISIT (OUTPATIENT)
Dept: SCHEDULING | Facility: HOME HEALTH | Age: 73
End: 2023-05-15
Payer: MEDICARE

## 2023-05-15 VITALS
SYSTOLIC BLOOD PRESSURE: 138 MMHG | TEMPERATURE: 97 F | OXYGEN SATURATION: 95 % | RESPIRATION RATE: 17 BRPM | DIASTOLIC BLOOD PRESSURE: 78 MMHG | HEART RATE: 82 BPM

## 2023-05-15 PROCEDURE — G0157 HHC PT ASSISTANT EA 15: HCPCS

## 2023-05-15 ASSESSMENT — ENCOUNTER SYMPTOMS: PAIN LOCATION - PAIN QUALITY: ACHING, THROBBING

## 2023-05-17 ENCOUNTER — HOME CARE VISIT (OUTPATIENT)
Dept: SCHEDULING | Facility: HOME HEALTH | Age: 73
End: 2023-05-17
Payer: MEDICARE

## 2023-05-18 ENCOUNTER — HOME CARE VISIT (OUTPATIENT)
Dept: SCHEDULING | Facility: HOME HEALTH | Age: 73
End: 2023-05-18
Payer: MEDICARE

## 2023-05-18 ENCOUNTER — OFFICE VISIT (OUTPATIENT)
Age: 73
End: 2023-05-18

## 2023-05-18 VITALS
BODY MASS INDEX: 30.22 KG/M2 | HEIGHT: 66 IN | SYSTOLIC BLOOD PRESSURE: 130 MMHG | WEIGHT: 188 LBS | DIASTOLIC BLOOD PRESSURE: 70 MMHG | HEART RATE: 80 BPM

## 2023-05-18 VITALS
RESPIRATION RATE: 17 BRPM | OXYGEN SATURATION: 95 % | HEART RATE: 66 BPM | DIASTOLIC BLOOD PRESSURE: 74 MMHG | TEMPERATURE: 97.7 F | SYSTOLIC BLOOD PRESSURE: 126 MMHG

## 2023-05-18 DIAGNOSIS — I10 PRIMARY HYPERTENSION: ICD-10-CM

## 2023-05-18 DIAGNOSIS — Z95.2 HISTORY OF TRANSCATHETER AORTIC VALVE REPLACEMENT (TAVR): Primary | ICD-10-CM

## 2023-05-18 PROCEDURE — G0157 HHC PT ASSISTANT EA 15: HCPCS

## 2023-05-18 ASSESSMENT — ENCOUNTER SYMPTOMS
PAIN LOCATION - PAIN QUALITY: SORE, TIGHT
COUGH: 0
ABDOMINAL PAIN: 0
BACK PAIN: 0
SHORTNESS OF BREATH: 1

## 2023-05-18 NOTE — PROGRESS NOTES
[facility] CARDIOLOGY  [address], [address]      23      NAME:  Abhinav Fuller  : 1950  MRN: [id number]      SUBJECTIVE:   Abhinav Fuller is a 74 y.o. male seen for a follow up visit regarding the following:     Chief Complaint   Patient presents with    Hypertension    Coronary Artery Disease       HPI:   74 y.o. male with history of critical aortic stenosis. He has type I bicuspid valve. 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 Echo 2022 with normal LVEF and TAVR function. Past Medical History, Past Surgical History, Family history, Social History, and Medications were all reviewed with the patient today and updated as necessary. Current Outpatient Medications   Medication Sig Dispense Refill    acetaminophen (TYLENOL) 500 MG tablet Take 2 tablets by mouth every 6 hours as needed (breakthrough pain)      polyethylene glycol (GLYCOLAX) 17 GM/SCOOP powder Take 17 g by mouth daily      methocarbamol (ROBAXIN-750) 750 MG tablet Take 1 tablet by mouth every 6 hours as needed for spasms. 40 tablet 0    ondansetron (ZOFRAN) 4 MG tablet Take 1 tablet by mouth every 6 hours as needed for Nausea or Vomiting 30 tablet 0    celecoxib (CELEBREX) 200 MG capsule Take 1 capsule by mouth daily 30 capsule 0    aspirin EC 81 MG EC tablet Take 1 tablet by mouth in the morning and 1 tablet in the evening. 60 tablet 0    tamsulosin (FLOMAX) 0.4 MG capsule Take 1 capsule by mouth in the morning and at bedtime Indications: evening dose at 6 pm      venlafaxine (EFFEXOR XR) 150 MG extended release capsule Take 1 capsule by mouth daily      donepezil (ARICEPT) 10 MG tablet Take 0.5 tablets by mouth every morning      OXcarbazepine (TRILEPTAL) 300 MG tablet Take 1 tablet by mouth 2 times daily      Ascorbic Acid (VITAMIN C PO) Take 1 tablet by mouth daily       No current facility-administered medications for this visit.      Patient

## 2023-05-19 ENCOUNTER — HOME CARE VISIT (OUTPATIENT)
Dept: SCHEDULING | Facility: HOME HEALTH | Age: 73
End: 2023-05-19
Payer: MEDICARE

## 2023-05-19 VITALS
HEART RATE: 68 BPM | RESPIRATION RATE: 17 BRPM | OXYGEN SATURATION: 100 % | DIASTOLIC BLOOD PRESSURE: 70 MMHG | TEMPERATURE: 97.3 F | SYSTOLIC BLOOD PRESSURE: 128 MMHG

## 2023-05-19 PROCEDURE — G0157 HHC PT ASSISTANT EA 15: HCPCS

## 2023-05-19 ASSESSMENT — ENCOUNTER SYMPTOMS: PAIN LOCATION - PAIN QUALITY: SHARP

## 2023-05-22 ENCOUNTER — HOME CARE VISIT (OUTPATIENT)
Dept: SCHEDULING | Facility: HOME HEALTH | Age: 73
End: 2023-05-22
Payer: MEDICARE

## 2023-05-22 VITALS
DIASTOLIC BLOOD PRESSURE: 68 MMHG | SYSTOLIC BLOOD PRESSURE: 116 MMHG | TEMPERATURE: 97.5 F | RESPIRATION RATE: 17 BRPM | OXYGEN SATURATION: 98 % | HEART RATE: 70 BPM

## 2023-05-22 DIAGNOSIS — Z96.651 S/P TOTAL KNEE ARTHROPLASTY, RIGHT: Primary | ICD-10-CM

## 2023-05-22 PROCEDURE — G0157 HHC PT ASSISTANT EA 15: HCPCS

## 2023-05-22 ASSESSMENT — ENCOUNTER SYMPTOMS: PAIN LOCATION - PAIN QUALITY: SORE

## 2023-05-24 ENCOUNTER — HOME CARE VISIT (OUTPATIENT)
Dept: SCHEDULING | Facility: HOME HEALTH | Age: 73
End: 2023-05-24
Payer: MEDICARE

## 2023-05-24 VITALS
SYSTOLIC BLOOD PRESSURE: 116 MMHG | HEART RATE: 72 BPM | RESPIRATION RATE: 17 BRPM | DIASTOLIC BLOOD PRESSURE: 68 MMHG | TEMPERATURE: 97.7 F | OXYGEN SATURATION: 95 %

## 2023-05-24 PROCEDURE — G0157 HHC PT ASSISTANT EA 15: HCPCS

## 2023-05-24 ASSESSMENT — ENCOUNTER SYMPTOMS: PAIN LOCATION - PAIN QUALITY: SORE

## 2023-05-29 ENCOUNTER — HOME CARE VISIT (OUTPATIENT)
Dept: SCHEDULING | Facility: HOME HEALTH | Age: 73
End: 2023-05-29
Payer: MEDICARE

## 2023-05-29 VITALS
DIASTOLIC BLOOD PRESSURE: 78 MMHG | HEART RATE: 76 BPM | OXYGEN SATURATION: 96 % | RESPIRATION RATE: 17 BRPM | SYSTOLIC BLOOD PRESSURE: 136 MMHG | TEMPERATURE: 97.7 F

## 2023-05-29 PROCEDURE — G0157 HHC PT ASSISTANT EA 15: HCPCS

## 2023-05-29 ASSESSMENT — ENCOUNTER SYMPTOMS: PAIN LOCATION - PAIN QUALITY: SORE

## 2023-05-31 ENCOUNTER — HOME CARE VISIT (OUTPATIENT)
Dept: SCHEDULING | Facility: HOME HEALTH | Age: 73
End: 2023-05-31
Payer: MEDICARE

## 2023-05-31 PROCEDURE — G0151 HHCP-SERV OF PT,EA 15 MIN: HCPCS

## 2023-06-01 VITALS
SYSTOLIC BLOOD PRESSURE: 120 MMHG | RESPIRATION RATE: 16 BRPM | DIASTOLIC BLOOD PRESSURE: 70 MMHG | TEMPERATURE: 98.7 F | HEART RATE: 68 BPM | OXYGEN SATURATION: 98 %

## 2023-06-01 DIAGNOSIS — M17.11 PRIMARY OSTEOARTHRITIS OF RIGHT KNEE: Primary | ICD-10-CM

## 2023-06-01 ASSESSMENT — ENCOUNTER SYMPTOMS: PAIN LOCATION - PAIN QUALITY: SORE

## 2023-06-02 RX ORDER — OXYCODONE HYDROCHLORIDE 5 MG/1
5-10 TABLET ORAL EVERY 4 HOURS PRN
Qty: 60 TABLET | Refills: 0 | Status: SHIPPED | OUTPATIENT
Start: 2023-06-02 | End: 2023-06-09

## 2023-06-06 ENCOUNTER — HOSPITAL ENCOUNTER (OUTPATIENT)
Dept: PHYSICAL THERAPY | Age: 73
Setting detail: RECURRING SERIES
Discharge: HOME | End: 2023-06-09
Payer: MEDICARE

## 2023-06-06 PROCEDURE — 97161 PT EVAL LOW COMPLEX 20 MIN: CPT

## 2023-06-06 ASSESSMENT — PAIN SCALES - GENERAL: PAINLEVEL_OUTOF10: 1

## 2023-06-06 NOTE — THERAPY EVALUATION
Shantell Leung  : 1950  Primary: [facility] Medicare Complete (Medicare Managed)  Secondary: [place] IRINEO [facility]  [address]  [address] [address] [address]  Phone: [phone]  Fax: [phone] Plan Frequency: 2 times a week  Plan of Care/Certification Expiration Date: 23      PT Visit Info:  Plan Frequency: 2 times a week  Plan of Care/Certification Expiration Date: 23  Total # of Visits to Date: 1      Visit Count:  1                OUTPATIENT PHYSICAL THERAPY:             OP NOTE TYPE: Initial Assessment 2023               Episode (outpatient R TKA) Appt Desk         Treatment Diagnosis:  Pain in Right Knee (M25.561)  Stiffness of Right Knee, Not elsewhere classified (M25.661)  Medical/Referring Diagnosis:  S/P total knee arthroplasty, right [W00.010]  Referring Physician:  CHEPE Guzman MD Orders:  PT Eval and Treat   Return MD Appt:  23  Date of Onset:  Onset Date: 23      Allergies:  Aspartame  Restrictions/Precautions:    Restrictions/Precautions: None  Required Braces or Orthoses?: No        Medications Last Reviewed:  2023     SUBJECTIVE   History of Injury/Illness (Reason for Referral)  Patient had been dealing with R knee pain and instability for the past 5 years prior to having his R total knee arthroplasty on 23. Since surgery patient has been doing fairly well, had home health for three weeks and according to patient only required utilizing a walker for a couple of days post-surgery. After he was done with the walker he was able to walk without any assistive device. Pain has been under control and patient reports that he hasn't had to take any in the past couple of days. Patient Stated Goal(s):  \"Full mobility. \"  Initial:     1/10 Post Session:     1/10  Past Medical History/Comorbidities:   Mr. Ekta Salamanca  has a past medical history of Arthritis, Cancer ([facility] [address] History of PFTs, History of transesophageal echocardiography

## 2023-06-20 ENCOUNTER — HOSPITAL ENCOUNTER (OUTPATIENT)
Dept: PHYSICAL THERAPY | Age: 73
Setting detail: RECURRING SERIES
Discharge: HOME | End: 2023-06-23
Payer: OTHER GOVERNMENT

## 2023-06-20 PROCEDURE — 97140 MANUAL THERAPY 1/> REGIONS: CPT

## 2023-06-20 PROCEDURE — 97110 THERAPEUTIC EXERCISES: CPT

## 2023-06-20 ASSESSMENT — PAIN SCALES - GENERAL: PAINLEVEL_OUTOF10: 2

## 2023-06-20 NOTE — PROGRESS NOTES
Irineo De La Torre  : 1950  Primary: Timmy Jeter (Medicare Managed)  Secondary: [facility] MEDICARE COMPLETE [facility] [employer]  [address] [place] [address]  Phone: [phone]  Fax: [phone] Plan Frequency: 2 times a week    Plan of Care/Certification Expiration Date: 23      >PT Visit Info:  Plan Frequency: 2 times a week  Plan of Care/Certification Expiration Date: 23  Total # of Visits to Date: 4      Visit Count:  4    OUTPATIENT PHYSICAL THERAPY:OP NOTE TYPE: Treatment Note 2023       Episode  }Appt Desk             Treatment Diagnosis:  Pain in Right Knee (M25.561)  Stiffness of Right Knee, Not elsewhere classified (M25.661)  Medical/Referring Diagnosis:  S/P total knee arthroplasty, right [O16.267]  Referring Physician:  CHEPE Singleton MD Orders:  PT Eval and Treat   Date of Onset:  Onset Date: 23     Allergies:   Aspartame  Restrictions/Precautions:  Restrictions/Precautions: None  Required Braces or Orthoses?: No  Right Lower Extremity Weight Bearing: Weight Bearing As Tolerated     Interventions Planned (Treatment may consist of any combination of the following):    Current Treatment Recommendations: Strengthening; ROM; Functional mobility training; Gait training; Neuromuscular re-education; Manual     >Subjective Comments:  patient reports that he was doing a strenous amount of work yesterday having to dig a ditch. States that he feels like his knee has increased stiffness and swelling this morning. >Initial:     2/10>Post Session:       1/10  Medications Last Reviewed:  2023  Updated Objective Findings:  None Today  Treatment   THERAPEUTIC EXERCISE: (35 minutes):    Exercises per grid below to improve mobility, strength, and balance. Required moderate visual, verbal, manual, and tactile cues to promote proper body alignment, promote proper body posture, and promote proper body mechanics.   Progressed resistance, range, and repetitions

## 2023-06-22 ENCOUNTER — APPOINTMENT (OUTPATIENT)
Dept: PHYSICAL THERAPY | Age: 73
End: 2023-06-22
Payer: OTHER GOVERNMENT

## 2023-06-27 ENCOUNTER — APPOINTMENT (OUTPATIENT)
Dept: PHYSICAL THERAPY | Age: 73
End: 2023-06-27
Payer: OTHER GOVERNMENT

## 2023-06-29 ENCOUNTER — APPOINTMENT (OUTPATIENT)
Dept: PHYSICAL THERAPY | Age: 73
End: 2023-06-29
Payer: OTHER GOVERNMENT

## 2023-07-05 ENCOUNTER — HOSPITAL ENCOUNTER (OUTPATIENT)
Dept: PHYSICAL THERAPY | Age: 73
Setting detail: RECURRING SERIES
Discharge: HOME | End: 2023-07-08
Payer: OTHER GOVERNMENT

## 2023-07-05 PROCEDURE — 97140 MANUAL THERAPY 1/> REGIONS: CPT

## 2023-07-05 PROCEDURE — 97110 THERAPEUTIC EXERCISES: CPT

## 2023-07-05 ASSESSMENT — PAIN SCALES - GENERAL: PAINLEVEL_OUTOF10: 0

## 2023-07-05 NOTE — PROGRESS NOTES
Soumya Wellington  : 1950  Primary: [employer] [facility] (Medicare Managed)  Secondary: [facility] MEDICARE COMPLETE [place] [employer]  [address] [facility] [address]  Phone: [phone]  Fax: [phone] Plan Frequency: 2 times a week    Plan of Care/Certification Expiration Date: 23      >PT Visit Info:  Plan Frequency: 2 times a week  Plan of Care/Certification Expiration Date: 23  Total # of Visits to Date: 5      Visit Count:  5    OUTPATIENT PHYSICAL THERAPY:OP NOTE TYPE: Treatment Note 2023       Episode  }Appt Desk             Treatment Diagnosis:  Pain in Right Knee (M25.561)  Stiffness of Right Knee, Not elsewhere classified (M25.661)  Medical/Referring Diagnosis:  S/P total knee arthroplasty, right [L71.647]  Referring Physician:  CHEPE Richey MD Orders:  PT Eval and Treat   Date of Onset:  Onset Date: 23     Allergies:   Aspartame  Restrictions/Precautions:  Restrictions/Precautions: None  Required Braces or Orthoses?: No  Right Lower Extremity Weight Bearing: Weight Bearing As Tolerated     Interventions Planned (Treatment may consist of any combination of the following):    Current Treatment Recommendations: Strengthening; ROM; Functional mobility training; Gait training; Neuromuscular re-education; Manual     >Subjective Comments:  patient reports that he has been good, hasn't had any pain however has increased stiffness in the morning as well as when having to change position after sitting for a prolonged period of time. States that he hasn't been doing his exercises. >Initial:     0/10>Post Session:       0/10  Medications Last Reviewed:  2023  Updated Objective Findings:  None Today  Treatment   THERAPEUTIC EXERCISE: (35 minutes):    Exercises per grid below to improve mobility, strength, and balance.   Required moderate visual, verbal, manual, and tactile cues to promote proper body alignment, promote proper body posture, and promote proper

## 2023-07-07 ENCOUNTER — HOSPITAL ENCOUNTER (OUTPATIENT)
Dept: PHYSICAL THERAPY | Age: 73
Setting detail: RECURRING SERIES
Discharge: HOME | End: 2023-07-10
Payer: OTHER GOVERNMENT

## 2023-07-07 PROCEDURE — 97110 THERAPEUTIC EXERCISES: CPT

## 2023-07-07 PROCEDURE — 97140 MANUAL THERAPY 1/> REGIONS: CPT

## 2023-07-07 ASSESSMENT — PAIN SCALES - GENERAL: PAINLEVEL_OUTOF10: 0

## 2023-07-10 ENCOUNTER — TELEPHONE (OUTPATIENT)
Dept: ORTHOPEDIC SURGERY | Age: 73
End: 2023-07-10

## 2023-07-10 NOTE — TELEPHONE ENCOUNTER
Pt wife called stating that the hose on the ice machine has been torn cant be connected I contacted Carlos Lee she gave me representative number I gave it to patient

## 2023-08-12 ENCOUNTER — HOSPITAL ENCOUNTER (INPATIENT)
Age: 73
LOS: 3 days | Discharge: HOME | DRG: 262 | End: 2023-08-15
Attending: EMERGENCY MEDICINE | Admitting: FAMILY MEDICINE
Payer: MEDICARE

## 2023-08-12 ENCOUNTER — APPOINTMENT (OUTPATIENT)
Dept: CT IMAGING | Age: 73
DRG: 262 | End: 2023-08-12
Payer: MEDICARE

## 2023-08-12 ENCOUNTER — APPOINTMENT (OUTPATIENT)
Dept: GENERAL RADIOLOGY | Age: 73
DRG: 262 | End: 2023-08-12
Payer: MEDICARE

## 2023-08-12 DIAGNOSIS — R55 SYNCOPE: ICD-10-CM

## 2023-08-12 DIAGNOSIS — G40.909 SEIZURE DISORDER (HCC): Primary | ICD-10-CM

## 2023-08-12 DIAGNOSIS — I77.9 BILATERAL CAROTID ARTERY DISEASE, UNSPECIFIED TYPE (HCC): ICD-10-CM

## 2023-08-12 DIAGNOSIS — I47.29 NON-SUSTAINED VENTRICULAR TACHYCARDIA (HCC): ICD-10-CM

## 2023-08-12 DIAGNOSIS — I44.7 LBBB (LEFT BUNDLE BRANCH BLOCK): Chronic | ICD-10-CM

## 2023-08-12 DIAGNOSIS — R55 SYNCOPE, UNSPECIFIED SYNCOPE TYPE: ICD-10-CM

## 2023-08-12 PROBLEM — R41.89 EPISODE OF UNRESPONSIVENESS: Status: ACTIVE | Noted: 2023-08-12

## 2023-08-12 PROBLEM — I10 HYPERTENSION: Chronic | Status: ACTIVE | Noted: 2022-08-26

## 2023-08-12 PROBLEM — R40.4 EPISODE OF UNRESPONSIVENESS: Status: ACTIVE | Noted: 2023-08-12

## 2023-08-12 LAB
ALBUMIN SERPL-MCNC: 3.5 G/DL (ref 3.2–4.6)
ALBUMIN/GLOB SERPL: 1.1 (ref 0.4–1.6)
ALP SERPL-CCNC: 130 U/L (ref 50–136)
ALT SERPL-CCNC: 20 U/L (ref 12–65)
ANION GAP SERPL CALC-SCNC: 5 MMOL/L (ref 2–11)
APPEARANCE UR: CLEAR
AST SERPL-CCNC: 15 U/L (ref 15–37)
BACTERIA URNS QL MICRO: NEGATIVE /HPF
BASOPHILS # BLD: 0.1 K/UL (ref 0–0.2)
BASOPHILS NFR BLD: 1 % (ref 0–2)
BILIRUB SERPL-MCNC: 0.2 MG/DL (ref 0.2–1.1)
BILIRUB UR QL: NEGATIVE
BUN SERPL-MCNC: 15 MG/DL (ref 8–23)
CALCIUM SERPL-MCNC: 9 MG/DL (ref 8.3–10.4)
CASTS URNS QL MICRO: NORMAL /LPF
CHLORIDE SERPL-SCNC: 106 MMOL/L (ref 101–110)
CK SERPL-CCNC: 59 U/L (ref 21–215)
CO2 SERPL-SCNC: 29 MMOL/L (ref 21–32)
COLOR UR: NORMAL
CREAT SERPL-MCNC: 0.9 MG/DL (ref 0.8–1.5)
DIFFERENTIAL METHOD BLD: ABNORMAL
EOSINOPHIL # BLD: 0.1 K/UL (ref 0–0.8)
EOSINOPHIL NFR BLD: 1 % (ref 0.5–7.8)
EPI CELLS #/AREA URNS HPF: NORMAL /HPF
ERYTHROCYTE [DISTWIDTH] IN BLOOD BY AUTOMATED COUNT: 13.3 % (ref 11.9–14.6)
GLOBULIN SER CALC-MCNC: 3.2 G/DL (ref 2.8–4.5)
GLUCOSE SERPL-MCNC: 103 MG/DL (ref 65–100)
GLUCOSE UR STRIP.AUTO-MCNC: NEGATIVE MG/DL
HCT VFR BLD AUTO: 41.5 % (ref 41.1–50.3)
HGB BLD-MCNC: 14 G/DL (ref 13.6–17.2)
HGB UR QL STRIP: NEGATIVE
IMM GRANULOCYTES # BLD AUTO: 0 K/UL (ref 0–0.5)
IMM GRANULOCYTES NFR BLD AUTO: 0 % (ref 0–5)
KETONES UR QL STRIP.AUTO: NEGATIVE MG/DL
LACTATE SERPL-SCNC: 0.6 MMOL/L (ref 0.4–2)
LEUKOCYTE ESTERASE UR QL STRIP.AUTO: NEGATIVE
LIPASE SERPL-CCNC: 111 U/L (ref 73–393)
LYMPHOCYTES # BLD: 1.2 K/UL (ref 0.5–4.6)
LYMPHOCYTES NFR BLD: 20 % (ref 13–44)
MAGNESIUM SERPL-MCNC: 2.3 MG/DL (ref 1.8–2.4)
MCH RBC QN AUTO: 32.6 PG (ref 26.1–32.9)
MCHC RBC AUTO-ENTMCNC: 33.7 G/DL (ref 31.4–35)
MCV RBC AUTO: 96.5 FL (ref 82–102)
MONOCYTES # BLD: 0.6 K/UL (ref 0.1–1.3)
MONOCYTES NFR BLD: 11 % (ref 4–12)
MUCOUS THREADS URNS QL MICRO: 0 /LPF
NEUTS SEG # BLD: 3.9 K/UL (ref 1.7–8.2)
NEUTS SEG NFR BLD: 67 % (ref 43–78)
NITRITE UR QL STRIP.AUTO: NEGATIVE
NRBC # BLD: 0 K/UL (ref 0–0.2)
PH UR STRIP: 5.5 (ref 5–9)
PLATELET # BLD AUTO: 236 K/UL (ref 150–450)
PMV BLD AUTO: 8.9 FL (ref 9.4–12.3)
POTASSIUM SERPL-SCNC: 4.2 MMOL/L (ref 3.5–5.1)
PROT SERPL-MCNC: 6.7 G/DL (ref 6.3–8.2)
PROT UR STRIP-MCNC: NEGATIVE MG/DL
RBC # BLD AUTO: 4.3 M/UL (ref 4.23–5.6)
RBC #/AREA URNS HPF: NORMAL /HPF
SODIUM SERPL-SCNC: 140 MMOL/L (ref 133–143)
SP GR UR REFRACTOMETRY: 1.01 (ref 1–1.02)
TROPONIN I SERPL HS-MCNC: 15.4 PG/ML (ref 0–14)
TROPONIN I SERPL HS-MCNC: 17 PG/ML (ref 0–14)
TSH W FREE THYROID IF ABNORMAL: 1.78 UIU/ML (ref 0.36–3.74)
URINE CULTURE IF INDICATED: NORMAL
UROBILINOGEN UR QL STRIP.AUTO: 0.2 EU/DL (ref 0.2–1)
WBC # BLD AUTO: 5.9 K/UL (ref 4.3–11.1)
WBC URNS QL MICRO: NORMAL /HPF

## 2023-08-12 PROCEDURE — 36415 COLL VENOUS BLD VENIPUNCTURE: CPT

## 2023-08-12 PROCEDURE — 82550 ASSAY OF CK (CPK): CPT

## 2023-08-12 PROCEDURE — 99285 EMERGENCY DEPT VISIT HI MDM: CPT

## 2023-08-12 PROCEDURE — 6370000000 HC RX 637 (ALT 250 FOR IP): Performed by: FAMILY MEDICINE

## 2023-08-12 PROCEDURE — 70450 CT HEAD/BRAIN W/O DYE: CPT

## 2023-08-12 PROCEDURE — 2580000003 HC RX 258: Performed by: FAMILY MEDICINE

## 2023-08-12 PROCEDURE — 80053 COMPREHEN METABOLIC PANEL: CPT

## 2023-08-12 PROCEDURE — 1100000003 HC PRIVATE W/ TELEMETRY

## 2023-08-12 PROCEDURE — 71045 X-RAY EXAM CHEST 1 VIEW: CPT

## 2023-08-12 PROCEDURE — 2580000003 HC RX 258: Performed by: EMERGENCY MEDICINE

## 2023-08-12 PROCEDURE — 83690 ASSAY OF LIPASE: CPT

## 2023-08-12 PROCEDURE — 84443 ASSAY THYROID STIM HORMONE: CPT

## 2023-08-12 PROCEDURE — 84484 ASSAY OF TROPONIN QUANT: CPT

## 2023-08-12 PROCEDURE — 83605 ASSAY OF LACTIC ACID: CPT

## 2023-08-12 PROCEDURE — 83735 ASSAY OF MAGNESIUM: CPT

## 2023-08-12 PROCEDURE — 6360000002 HC RX W HCPCS: Performed by: FAMILY MEDICINE

## 2023-08-12 PROCEDURE — 93005 ELECTROCARDIOGRAM TRACING: CPT | Performed by: EMERGENCY MEDICINE

## 2023-08-12 PROCEDURE — 85025 COMPLETE CBC W/AUTO DIFF WBC: CPT

## 2023-08-12 PROCEDURE — 81001 URINALYSIS AUTO W/SCOPE: CPT

## 2023-08-12 PROCEDURE — 80183 DRUG SCRN QUANT OXCARBAZEPIN: CPT

## 2023-08-12 RX ORDER — DONEPEZIL HYDROCHLORIDE 5 MG/1
5 TABLET, FILM COATED ORAL EVERY MORNING
Status: DISCONTINUED | OUTPATIENT
Start: 2023-08-13 | End: 2023-08-15 | Stop reason: HOSPADM

## 2023-08-12 RX ORDER — LOSARTAN POTASSIUM 50 MG/1
50 TABLET ORAL NIGHTLY
Status: DISCONTINUED | OUTPATIENT
Start: 2023-08-12 | End: 2023-08-15 | Stop reason: HOSPADM

## 2023-08-12 RX ORDER — FINASTERIDE 5 MG/1
5 TABLET, FILM COATED ORAL DAILY
COMMUNITY

## 2023-08-12 RX ORDER — SODIUM CHLORIDE 0.9 % (FLUSH) 0.9 %
5-40 SYRINGE (ML) INJECTION PRN
Status: DISCONTINUED | OUTPATIENT
Start: 2023-08-12 | End: 2023-08-15 | Stop reason: HOSPADM

## 2023-08-12 RX ORDER — TAMSULOSIN HYDROCHLORIDE 0.4 MG/1
0.4 CAPSULE ORAL 2 TIMES DAILY
Status: DISCONTINUED | OUTPATIENT
Start: 2023-08-12 | End: 2023-08-15 | Stop reason: HOSPADM

## 2023-08-12 RX ORDER — POLYETHYLENE GLYCOL 3350 17 G/17G
17 POWDER, FOR SOLUTION ORAL DAILY PRN
Status: DISCONTINUED | OUTPATIENT
Start: 2023-08-12 | End: 2023-08-15 | Stop reason: HOSPADM

## 2023-08-12 RX ORDER — SODIUM CHLORIDE 9 MG/ML
INJECTION, SOLUTION INTRAVENOUS PRN
Status: DISCONTINUED | OUTPATIENT
Start: 2023-08-12 | End: 2023-08-15 | Stop reason: HOSPADM

## 2023-08-12 RX ORDER — ENOXAPARIN SODIUM 100 MG/ML
40 INJECTION SUBCUTANEOUS EVERY 24 HOURS
Status: DISCONTINUED | OUTPATIENT
Start: 2023-08-12 | End: 2023-08-15 | Stop reason: HOSPADM

## 2023-08-12 RX ORDER — ONDANSETRON 2 MG/ML
4 INJECTION INTRAMUSCULAR; INTRAVENOUS EVERY 6 HOURS PRN
Status: DISCONTINUED | OUTPATIENT
Start: 2023-08-12 | End: 2023-08-15 | Stop reason: HOSPADM

## 2023-08-12 RX ORDER — 0.9 % SODIUM CHLORIDE 0.9 %
1000 INTRAVENOUS SOLUTION INTRAVENOUS
Status: DISCONTINUED | OUTPATIENT
Start: 2023-08-12 | End: 2023-08-12

## 2023-08-12 RX ORDER — LOSARTAN POTASSIUM 50 MG/1
50 TABLET ORAL NIGHTLY
COMMUNITY

## 2023-08-12 RX ORDER — AMLODIPINE BESYLATE 10 MG/1
10 TABLET ORAL DAILY
COMMUNITY

## 2023-08-12 RX ORDER — METOPROLOL TARTRATE 5 MG/5ML
5 INJECTION INTRAVENOUS EVERY 6 HOURS PRN
Status: DISCONTINUED | OUTPATIENT
Start: 2023-08-12 | End: 2023-08-15 | Stop reason: HOSPADM

## 2023-08-12 RX ORDER — FINASTERIDE 5 MG/1
5 TABLET, FILM COATED ORAL DAILY
Status: DISCONTINUED | OUTPATIENT
Start: 2023-08-13 | End: 2023-08-15 | Stop reason: HOSPADM

## 2023-08-12 RX ORDER — ONDANSETRON 4 MG/1
4 TABLET, ORALLY DISINTEGRATING ORAL EVERY 8 HOURS PRN
Status: DISCONTINUED | OUTPATIENT
Start: 2023-08-12 | End: 2023-08-15 | Stop reason: HOSPADM

## 2023-08-12 RX ORDER — ASPIRIN 81 MG/1
81 TABLET ORAL DAILY
Status: DISCONTINUED | OUTPATIENT
Start: 2023-08-13 | End: 2023-08-15 | Stop reason: HOSPADM

## 2023-08-12 RX ORDER — SODIUM CHLORIDE 0.9 % (FLUSH) 0.9 %
5-40 SYRINGE (ML) INJECTION EVERY 12 HOURS SCHEDULED
Status: DISCONTINUED | OUTPATIENT
Start: 2023-08-12 | End: 2023-08-15 | Stop reason: HOSPADM

## 2023-08-12 RX ORDER — POTASSIUM CHLORIDE 7.45 MG/ML
10 INJECTION INTRAVENOUS PRN
Status: DISCONTINUED | OUTPATIENT
Start: 2023-08-12 | End: 2023-08-15 | Stop reason: HOSPADM

## 2023-08-12 RX ORDER — VENLAFAXINE HYDROCHLORIDE 150 MG/1
150 CAPSULE, EXTENDED RELEASE ORAL
Status: DISCONTINUED | OUTPATIENT
Start: 2023-08-13 | End: 2023-08-15 | Stop reason: HOSPADM

## 2023-08-12 RX ORDER — POTASSIUM CHLORIDE 20 MEQ/1
40 TABLET, EXTENDED RELEASE ORAL PRN
Status: DISCONTINUED | OUTPATIENT
Start: 2023-08-12 | End: 2023-08-15 | Stop reason: HOSPADM

## 2023-08-12 RX ORDER — MAGNESIUM HYDROXIDE/ALUMINUM HYDROXICE/SIMETHICONE 120; 1200; 1200 MG/30ML; MG/30ML; MG/30ML
30 SUSPENSION ORAL EVERY 6 HOURS PRN
Status: DISCONTINUED | OUTPATIENT
Start: 2023-08-12 | End: 2023-08-15 | Stop reason: HOSPADM

## 2023-08-12 RX ORDER — LORAZEPAM 2 MG/ML
2 INJECTION INTRAMUSCULAR
Status: DISCONTINUED | OUTPATIENT
Start: 2023-08-12 | End: 2023-08-15 | Stop reason: HOSPADM

## 2023-08-12 RX ORDER — ACETAMINOPHEN 325 MG/1
650 TABLET ORAL EVERY 6 HOURS PRN
Status: DISCONTINUED | OUTPATIENT
Start: 2023-08-12 | End: 2023-08-15 | Stop reason: HOSPADM

## 2023-08-12 RX ORDER — OXCARBAZEPINE 300 MG/1
300 TABLET, FILM COATED ORAL 2 TIMES DAILY
Status: DISCONTINUED | OUTPATIENT
Start: 2023-08-12 | End: 2023-08-15 | Stop reason: HOSPADM

## 2023-08-12 RX ORDER — AMLODIPINE BESYLATE 10 MG/1
10 TABLET ORAL DAILY
Status: DISCONTINUED | OUTPATIENT
Start: 2023-08-13 | End: 2023-08-15 | Stop reason: HOSPADM

## 2023-08-12 RX ORDER — MAGNESIUM SULFATE IN WATER 40 MG/ML
2000 INJECTION, SOLUTION INTRAVENOUS PRN
Status: DISCONTINUED | OUTPATIENT
Start: 2023-08-12 | End: 2023-08-15 | Stop reason: HOSPADM

## 2023-08-12 RX ADMIN — LOSARTAN POTASSIUM 50 MG: 50 TABLET, FILM COATED ORAL at 21:14

## 2023-08-12 RX ADMIN — OXCARBAZEPINE 300 MG: 300 TABLET, FILM COATED ORAL at 21:14

## 2023-08-12 RX ADMIN — SODIUM CHLORIDE 1000 ML: 900 INJECTION, SOLUTION INTRAVENOUS at 18:13

## 2023-08-12 RX ADMIN — TAMSULOSIN HYDROCHLORIDE 0.4 MG: 0.4 CAPSULE ORAL at 21:14

## 2023-08-12 RX ADMIN — SODIUM CHLORIDE, PRESERVATIVE FREE 5 ML: 5 INJECTION INTRAVENOUS at 21:17

## 2023-08-12 RX ADMIN — ENOXAPARIN SODIUM 40 MG: 100 INJECTION SUBCUTANEOUS at 21:14

## 2023-08-12 ASSESSMENT — ENCOUNTER SYMPTOMS
SHORTNESS OF BREATH: 0
CONSTIPATION: 0
DIARRHEA: 0
VOMITING: 0
BACK PAIN: 0
NAUSEA: 0
COLOR CHANGE: 0
ABDOMINAL PAIN: 0
SORE THROAT: 0
RHINORRHEA: 0
COUGH: 0

## 2023-08-12 ASSESSMENT — PAIN - FUNCTIONAL ASSESSMENT: PAIN_FUNCTIONAL_ASSESSMENT: NONE - DENIES PAIN

## 2023-08-12 NOTE — H&P
Hospitalist History and Physical   Admit Date:  2023  1:27 PM   Name:  Galina Jaquez   Age:  74 y.o. Sex:  male  :  1950   MRN:  [id number]   Room:  [facility]/    Presenting/Chief Complaint: Seizures     Reason(s) for Admission: NSVT (nonsustained ventricular tachycardia) ([address]) [I47.29]     History of Present Illness:   Galina Jaquez is a 74 y.o. male with medical history of severe aortic valve stenosis s/p TAVR, HTN, BPH, Seizure disorder, obesity who presented from home via EMS with c/o unresponsive episode. Per Ems, eyes open but not responding. Then thought to have a post ictal period where he was responding but slightly inappropriate. In ED, he was A&Ox4 on arriavl and somewhat off but improved back to baseline. History of seizures on trileptal but prior seizures described as grand mal. Last seizure, many years ago. Usually triggered by aspartame, would drink diet coke and suffer seizure. Took tripletal this AM as prescribed. No new medications. Patient was working on lawn mower earlier today and then his wife and him were getting ready to drive to the store. When he had a blank stare, not responding, profusely sweating. Patient was aware of events occurring but could not talk and couldn't function. While wife on phone with EMS, he started saying oh lord take me. By the time EMS arrived, he could only answer who his wife was. Earlier today he was feeling off like he was going to pass out with some lightheadedness. No tonguing biting, urinary/fecal IC. Has episodes of SOB where he can't catch his breath intermittently and twinges of chest pain ongoing for years. Once he came out of episode, he denies fatigue or lethargy. Says he feels great and energized, ready to go home. Per wife, he is back to his baseline now. While in the ED, had a run of V tach, telemetry strip scanned into chart. EKGs with sinus rhythm and LBBB. HS trop 15.4. CTH w/o acute findings.        Assessment &

## 2023-08-12 NOTE — ED PROVIDER NOTES
Emergency Department Provider Note       PCP: NOT ON FILE   Age: 74 y.o. Sex: male     DISPOSITION Decision To Admit 08/12/2023 03:27:35 PM       ICD-10-CM    1. Seizure disorder ([address])  G40.909       2. Non-sustained ventricular tachycardia (HCC)  I47.29           Medical Decision Making     Complexity of Problems Addressed:  1 or more acute illnesses that pose a threat to life or bodily function. Data Reviewed and Analyzed:   I independently ordered and reviewed each unique test.  I reviewed external records: provider visit note from outside specialist.   The patients assessment required an independent historian: EMS and wife. The reason they were needed is important historical information not provided by the patient. I independently ordered and interpreted the ED EKG in the absence of a Cardiologist.    Rate: 80  EKG Interpretation: EKG Interpretation: sinus rhythm, no evidence of arrhythmia and left bundle branch block  ST Segments: Nonspecific ST segments - NO STEMI    EKG: normal sinus rhythm, nonspecific ST and T waves changes, LBBB. Rate 72    I independently interpreted the cardiac monitor rhythm strip patient with 18 beat run of V. tach. .  I interpreted the X-rays no infiltrate. I interpreted the CT Scan no hemorrhage. Discussion of management or test interpretation. Patient was brought in by EMS for possible absence seizure. He has a history of seizures on Trileptal but previous seizures have been grand mal in nature. Patient had some confusion after the episode. EMS was on scene at this point. While in the ER patient had a run of V. tach. He has a history of a TAVR procedure in the past.  He is currently in normal sinus rhythm. Will admit for further treatment and evaluation. The patient was admitted and I have discussed patient management with the admitting provider.     Risk of Complications and/or Morbidity of Patient Management:  Shared medical decision making was utilized in

## 2023-08-12 NOTE — ED TRIAGE NOTES
Pt arrives via [facility] from home for what is presumed to be an absence seizure. EMS states pt had eyes open but not responding. Pt then went through a post-ictal period where he was responding but slightly inappropriately. Pt arrives to our ED Aox4.

## 2023-08-12 NOTE — ED NOTES
TRANSFER - OUT REPORT:    Verbal report given to dameon Meeks  being transferred to 6th floor for routine progression of patient care       Report consisted of patient's Situation, Background, Assessment and   Recommendations(SBAR). Information from the following report(s) Nurse Handoff Report was reviewed with the receiving nurse. Sharlene Fall Assessment:    Presents to emergency department  because of falls (Syncope, seizure, or loss of consciousness): No  Age > 70: No  Altered Mental Status, Intoxication with alcohol or substance confusion (Disorientation, impaired judgment, poor safety awaremess, or inability to follow instructions): No  Impaired Mobility: Ambulates or transfers with assistive devices or assistance; Unable to ambulate or transer.: No  Nursing Judgement: No          Lines:       Opportunity for questions and clarification was provided.       Patient transported with:  Monitor          Roshni Powell RN  08/12/23 1070

## 2023-08-13 ENCOUNTER — APPOINTMENT (OUTPATIENT)
Dept: NON INVASIVE DIAGNOSTICS | Age: 73
DRG: 262 | End: 2023-08-13
Payer: MEDICARE

## 2023-08-13 ENCOUNTER — APPOINTMENT (OUTPATIENT)
Dept: CT IMAGING | Age: 73
DRG: 262 | End: 2023-08-13
Payer: MEDICARE

## 2023-08-13 LAB
ALBUMIN SERPL-MCNC: 3.4 G/DL (ref 3.2–4.6)
ALBUMIN/GLOB SERPL: 1.3 (ref 0.4–1.6)
ALP SERPL-CCNC: 124 U/L (ref 50–136)
ALT SERPL-CCNC: 19 U/L (ref 12–65)
ANION GAP SERPL CALC-SCNC: 6 MMOL/L (ref 2–11)
AST SERPL-CCNC: 14 U/L (ref 15–37)
BASOPHILS # BLD: 0.1 K/UL (ref 0–0.2)
BASOPHILS NFR BLD: 1 % (ref 0–2)
BILIRUB SERPL-MCNC: 0.2 MG/DL (ref 0.2–1.1)
BUN SERPL-MCNC: 18 MG/DL (ref 8–23)
CALCIUM SERPL-MCNC: 8.6 MG/DL (ref 8.3–10.4)
CHLORIDE SERPL-SCNC: 108 MMOL/L (ref 101–110)
CO2 SERPL-SCNC: 28 MMOL/L (ref 21–32)
CREAT SERPL-MCNC: 0.8 MG/DL (ref 0.8–1.5)
DIFFERENTIAL METHOD BLD: ABNORMAL
ECHO AO ASC DIAM: 3.3 CM
ECHO AO ASCENDING AORTA INDEX: 1.66 CM/M2
ECHO AO ROOT DIAM: 2.7 CM
ECHO AO ROOT INDEX: 1.36 CM/M2
ECHO AV AREA PEAK VELOCITY: 1.4 CM2
ECHO AV AREA VTI: 1.4 CM2
ECHO AV AREA/BSA PEAK VELOCITY: 0.7 CM2/M2
ECHO AV AREA/BSA VTI: 0.7 CM2/M2
ECHO AV MEAN GRADIENT: 21 MMHG
ECHO AV MEAN VELOCITY: 2.1 M/S
ECHO AV MEAN VELOCITY: 2.1 M/S
ECHO AV PEAK GRADIENT: 41 MMHG
ECHO AV PEAK VELOCITY: 3.2 M/S
ECHO AV VELOCITY RATIO: 0.41
ECHO AV VTI: 69.6 CM
ECHO BSA: 2.04 M2
ECHO EST RA PRESSURE: 3 MMHG
ECHO IVC PROX: 2.1 CM
ECHO LA AREA 2C: 16.6 CM2
ECHO LA AREA 4C: 21.7 CM2
ECHO LA DIAMETER INDEX: 2.31 CM/M2
ECHO LA DIAMETER: 4.6 CM
ECHO LA MAJOR AXIS: 5.5 CM
ECHO LA MINOR AXIS: 5 CM
ECHO LA TO AORTIC ROOT RATIO: 1.7
ECHO LA VOL 2C: 45 ML (ref 18–58)
ECHO LA VOL 4C: 66 ML (ref 18–58)
ECHO LA VOL BP: 57 ML (ref 18–58)
ECHO LA VOL/BSA BIPLANE: 29 ML/M2 (ref 16–34)
ECHO LA VOLUME INDEX A2C: 23 ML/M2 (ref 16–34)
ECHO LA VOLUME INDEX A4C: 33 ML/M2 (ref 16–34)
ECHO LV E' LATERAL VELOCITY: 13 CM/S
ECHO LV E' SEPTAL VELOCITY: 9 CM/S
ECHO LV EDV A2C: 182 ML
ECHO LV EDV A4C: 160 ML
ECHO LV EDV INDEX A4C: 80 ML/M2
ECHO LV EDV NDEX A2C: 91 ML/M2
ECHO LV EJECTION FRACTION A2C: 46 %
ECHO LV EJECTION FRACTION A4C: 52 %
ECHO LV EJECTION FRACTION BIPLANE: 47 % (ref 55–100)
ECHO LV ESV A2C: 98 ML
ECHO LV ESV A4C: 76 ML
ECHO LV ESV INDEX A2C: 49 ML/M2
ECHO LV ESV INDEX A4C: 38 ML/M2
ECHO LV FRACTIONAL SHORTENING: 30 % (ref 28–44)
ECHO LV INTERNAL DIMENSION DIASTOLE INDEX: 2.31 CM/M2
ECHO LV INTERNAL DIMENSION DIASTOLIC: 4.6 CM (ref 4.2–5.9)
ECHO LV INTERNAL DIMENSION SYSTOLIC INDEX: 1.61 CM/M2
ECHO LV INTERNAL DIMENSION SYSTOLIC: 3.2 CM
ECHO LV IVSD: 0.9 CM (ref 0.6–1)
ECHO LV MASS 2D: 158.8 G (ref 88–224)
ECHO LV MASS INDEX 2D: 79.8 G/M2 (ref 49–115)
ECHO LV POSTERIOR WALL DIASTOLIC: 1.1 CM (ref 0.6–1)
ECHO LV RELATIVE WALL THICKNESS RATIO: 0.48
ECHO LVOT AREA: 3.5 CM2
ECHO LVOT AV VTI INDEX: 0.4
ECHO LVOT DIAM: 2.1 CM
ECHO LVOT MEAN GRADIENT: 3 MMHG
ECHO LVOT PEAK GRADIENT: 6 MMHG
ECHO LVOT PEAK VELOCITY: 1.3 M/S
ECHO LVOT STROKE VOLUME INDEX: 48 ML/M2
ECHO LVOT SV: 95.5 ML
ECHO LVOT VTI: 27.6 CM
ECHO MV A VELOCITY: 1.33 M/S
ECHO MV E DECELERATION TIME (DT): 292 MS
ECHO MV E VELOCITY: 1.01 M/S
ECHO MV E/A RATIO: 0.76
ECHO MV E/E' LATERAL: 7.77
ECHO MV E/E' RATIO (AVERAGED): 9.5
ECHO MV E/E' SEPTAL: 11.22
ECHO PV MAX VELOCITY: 1.2 M/S
ECHO PV PEAK GRADIENT: 5 MMHG
ECHO RIGHT VENTRICULAR SYSTOLIC PRESSURE (RVSP): 21 MMHG
ECHO RV BASAL DIMENSION: 4.3 CM
ECHO RV TAPSE: 3.2 CM (ref 1.7–?)
ECHO TV REGURGITANT MAX VELOCITY: 2.15 M/S
ECHO TV REGURGITANT PEAK GRADIENT: 18 MMHG
EKG ATRIAL RATE: 72 BPM
EKG ATRIAL RATE: 80 BPM
EKG DIAGNOSIS: NORMAL
EKG DIAGNOSIS: NORMAL
EKG P AXIS: 54 DEGREES
EKG P AXIS: 55 DEGREES
EKG P-R INTERVAL: 170 MS
EKG P-R INTERVAL: 183 MS
EKG Q-T INTERVAL: 385 MS
EKG Q-T INTERVAL: 407 MS
EKG QRS DURATION: 126 MS
EKG QRS DURATION: 129 MS
EKG QTC CALCULATION (BAZETT): 445 MS
EKG QTC CALCULATION (BAZETT): 446 MS
EKG R AXIS: -27 DEGREES
EKG R AXIS: -27 DEGREES
EKG T AXIS: 85 DEGREES
EKG T AXIS: 93 DEGREES
EKG VENTRICULAR RATE: 72 BPM
EKG VENTRICULAR RATE: 80 BPM
EOSINOPHIL # BLD: 0.2 K/UL (ref 0–0.8)
EOSINOPHIL NFR BLD: 3 % (ref 0.5–7.8)
ERYTHROCYTE [DISTWIDTH] IN BLOOD BY AUTOMATED COUNT: 13.2 % (ref 11.9–14.6)
GLOBULIN SER CALC-MCNC: 2.7 G/DL (ref 2.8–4.5)
GLUCOSE BLD STRIP.AUTO-MCNC: 86 MG/DL (ref 65–100)
GLUCOSE SERPL-MCNC: 82 MG/DL (ref 65–100)
HCT VFR BLD AUTO: 40.7 % (ref 41.1–50.3)
HGB BLD-MCNC: 13.3 G/DL (ref 13.6–17.2)
IMM GRANULOCYTES # BLD AUTO: 0 K/UL (ref 0–0.5)
IMM GRANULOCYTES NFR BLD AUTO: 0 % (ref 0–5)
LYMPHOCYTES # BLD: 1.9 K/UL (ref 0.5–4.6)
LYMPHOCYTES NFR BLD: 35 % (ref 13–44)
MAGNESIUM SERPL-MCNC: 2.3 MG/DL (ref 1.8–2.4)
MCH RBC QN AUTO: 31.7 PG (ref 26.1–32.9)
MCHC RBC AUTO-ENTMCNC: 32.7 G/DL (ref 31.4–35)
MCV RBC AUTO: 97.1 FL (ref 82–102)
MONOCYTES # BLD: 0.6 K/UL (ref 0.1–1.3)
MONOCYTES NFR BLD: 12 % (ref 4–12)
NEUTS SEG # BLD: 2.5 K/UL (ref 1.7–8.2)
NEUTS SEG NFR BLD: 49 % (ref 43–78)
NRBC # BLD: 0 K/UL (ref 0–0.2)
PLATELET # BLD AUTO: 247 K/UL (ref 150–450)
PMV BLD AUTO: 9.3 FL (ref 9.4–12.3)
POTASSIUM SERPL-SCNC: 3.9 MMOL/L (ref 3.5–5.1)
PROT SERPL-MCNC: 6.1 G/DL (ref 6.3–8.2)
RBC # BLD AUTO: 4.19 M/UL (ref 4.23–5.6)
SERVICE CMNT-IMP: NORMAL
SODIUM SERPL-SCNC: 142 MMOL/L (ref 133–143)
WBC # BLD AUTO: 5.3 K/UL (ref 4.3–11.1)

## 2023-08-13 PROCEDURE — 2580000003 HC RX 258: Performed by: FAMILY MEDICINE

## 2023-08-13 PROCEDURE — 6360000004 HC RX CONTRAST MEDICATION: Performed by: INTERNAL MEDICINE

## 2023-08-13 PROCEDURE — 82962 GLUCOSE BLOOD TEST: CPT

## 2023-08-13 PROCEDURE — 83735 ASSAY OF MAGNESIUM: CPT

## 2023-08-13 PROCEDURE — 80053 COMPREHEN METABOLIC PANEL: CPT

## 2023-08-13 PROCEDURE — 70498 CT ANGIOGRAPHY NECK: CPT

## 2023-08-13 PROCEDURE — 1100000003 HC PRIVATE W/ TELEMETRY

## 2023-08-13 PROCEDURE — C8929 TTE W OR WO FOL WCON,DOPPLER: HCPCS

## 2023-08-13 PROCEDURE — 36415 COLL VENOUS BLD VENIPUNCTURE: CPT

## 2023-08-13 PROCEDURE — 97530 THERAPEUTIC ACTIVITIES: CPT

## 2023-08-13 PROCEDURE — 97161 PT EVAL LOW COMPLEX 20 MIN: CPT

## 2023-08-13 PROCEDURE — 93010 ELECTROCARDIOGRAM REPORT: CPT | Performed by: INTERNAL MEDICINE

## 2023-08-13 PROCEDURE — 6360000002 HC RX W HCPCS: Performed by: FAMILY MEDICINE

## 2023-08-13 PROCEDURE — 6370000000 HC RX 637 (ALT 250 FOR IP): Performed by: FAMILY MEDICINE

## 2023-08-13 PROCEDURE — 2580000003 HC RX 258: Performed by: INTERNAL MEDICINE

## 2023-08-13 PROCEDURE — 85025 COMPLETE CBC W/AUTO DIFF WBC: CPT

## 2023-08-13 RX ADMIN — OXCARBAZEPINE 300 MG: 300 TABLET, FILM COATED ORAL at 08:53

## 2023-08-13 RX ADMIN — DONEPEZIL HYDROCHLORIDE 5 MG: 5 TABLET, FILM COATED ORAL at 08:53

## 2023-08-13 RX ADMIN — ENOXAPARIN SODIUM 40 MG: 100 INJECTION SUBCUTANEOUS at 16:33

## 2023-08-13 RX ADMIN — ASPIRIN 81 MG: 81 TABLET ORAL at 08:53

## 2023-08-13 RX ADMIN — VENLAFAXINE HYDROCHLORIDE 150 MG: 150 CAPSULE, EXTENDED RELEASE ORAL at 08:53

## 2023-08-13 RX ADMIN — SODIUM CHLORIDE, PRESERVATIVE FREE 10 ML: 5 INJECTION INTRAVENOUS at 20:51

## 2023-08-13 RX ADMIN — FINASTERIDE 5 MG: 5 TABLET, FILM COATED ORAL at 08:52

## 2023-08-13 RX ADMIN — IOPAMIDOL 100 ML: 755 INJECTION, SOLUTION INTRAVENOUS at 12:44

## 2023-08-13 RX ADMIN — TAMSULOSIN HYDROCHLORIDE 0.4 MG: 0.4 CAPSULE ORAL at 20:49

## 2023-08-13 RX ADMIN — OXCARBAZEPINE 300 MG: 300 TABLET, FILM COATED ORAL at 20:49

## 2023-08-13 RX ADMIN — SODIUM CHLORIDE, PRESERVATIVE FREE 0.75 ML: 5 INJECTION INTRAVENOUS at 10:00

## 2023-08-13 RX ADMIN — TAMSULOSIN HYDROCHLORIDE 0.4 MG: 0.4 CAPSULE ORAL at 08:53

## 2023-08-13 RX ADMIN — AMLODIPINE BESYLATE 10 MG: 10 TABLET ORAL at 08:53

## 2023-08-13 RX ADMIN — LOSARTAN POTASSIUM 50 MG: 50 TABLET, FILM COATED ORAL at 20:49

## 2023-08-13 NOTE — PLAN OF CARE
Problem: Discharge Planning  Goal: Discharge to home or other facility with appropriate resources  Outcome: Progressing  Flowsheets (Taken 8/12/2023 1950)  Discharge to home or other facility with appropriate resources: Identify barriers to discharge with patient and caregiver

## 2023-08-13 NOTE — FLOWSHEET NOTE
Ortho Stat Bps:       08/13/23 0002   Vital Signs   BP (!) 144/76   MAP (Calculated) 99   MAP (mmHg) 95   BP Location Right upper arm   BP Method Automatic   Patient Position Supine   Blood Pressure Lying 144/76   Pulse Lying 66 PER MINUTE   Blood Pressure Sitting 139/79   Pulse Sitting 77 PER MINUTE   Blood Pressure Standing 135/71   Pulse Standing 85 PER MINUTE   Level of Consciousness 0

## 2023-08-14 ENCOUNTER — APPOINTMENT (OUTPATIENT)
Dept: ULTRASOUND IMAGING | Age: 73
DRG: 262 | End: 2023-08-14
Payer: MEDICARE

## 2023-08-14 PROBLEM — R55 SYNCOPE: Status: ACTIVE | Noted: 2023-08-14

## 2023-08-14 PROBLEM — I77.9 BILATERAL CAROTID ARTERY DISEASE (HCC): Status: ACTIVE | Noted: 2023-08-14

## 2023-08-14 LAB
ANION GAP SERPL CALC-SCNC: 5 MMOL/L (ref 2–11)
BASOPHILS # BLD: 0.1 K/UL (ref 0–0.2)
BASOPHILS NFR BLD: 1 % (ref 0–2)
BUN SERPL-MCNC: 13 MG/DL (ref 8–23)
CALCIUM SERPL-MCNC: 8.6 MG/DL (ref 8.3–10.4)
CHLORIDE SERPL-SCNC: 110 MMOL/L (ref 101–110)
CO2 SERPL-SCNC: 28 MMOL/L (ref 21–32)
CREAT SERPL-MCNC: 0.8 MG/DL (ref 0.8–1.5)
DIFFERENTIAL METHOD BLD: ABNORMAL
ECHO BSA: 2.04 M2
EOSINOPHIL # BLD: 0.2 K/UL (ref 0–0.8)
EOSINOPHIL NFR BLD: 3 % (ref 0.5–7.8)
ERYTHROCYTE [DISTWIDTH] IN BLOOD BY AUTOMATED COUNT: 13.2 % (ref 11.9–14.6)
GLUCOSE SERPL-MCNC: 94 MG/DL (ref 65–100)
HCT VFR BLD AUTO: 40.5 % (ref 41.1–50.3)
HGB BLD-MCNC: 13.5 G/DL (ref 13.6–17.2)
IMM GRANULOCYTES # BLD AUTO: 0 K/UL (ref 0–0.5)
IMM GRANULOCYTES NFR BLD AUTO: 0 % (ref 0–5)
LYMPHOCYTES # BLD: 1.7 K/UL (ref 0.5–4.6)
LYMPHOCYTES NFR BLD: 33 % (ref 13–44)
MAGNESIUM SERPL-MCNC: 2.6 MG/DL (ref 1.8–2.4)
MCH RBC QN AUTO: 32.1 PG (ref 26.1–32.9)
MCHC RBC AUTO-ENTMCNC: 33.3 G/DL (ref 31.4–35)
MCV RBC AUTO: 96.2 FL (ref 82–102)
MONOCYTES # BLD: 0.6 K/UL (ref 0.1–1.3)
MONOCYTES NFR BLD: 12 % (ref 4–12)
NEUTS SEG # BLD: 2.6 K/UL (ref 1.7–8.2)
NEUTS SEG NFR BLD: 51 % (ref 43–78)
NRBC # BLD: 0 K/UL (ref 0–0.2)
PLATELET # BLD AUTO: 240 K/UL (ref 150–450)
PMV BLD AUTO: 9.1 FL (ref 9.4–12.3)
POTASSIUM SERPL-SCNC: 4.2 MMOL/L (ref 3.5–5.1)
RBC # BLD AUTO: 4.21 M/UL (ref 4.23–5.6)
SODIUM SERPL-SCNC: 143 MMOL/L (ref 133–143)
WBC # BLD AUTO: 5 K/UL (ref 4.3–11.1)

## 2023-08-14 PROCEDURE — 99232 SBSQ HOSP IP/OBS MODERATE 35: CPT | Performed by: NURSE PRACTITIONER

## 2023-08-14 PROCEDURE — 2580000003 HC RX 258: Performed by: FAMILY MEDICINE

## 2023-08-14 PROCEDURE — 80048 BASIC METABOLIC PNL TOTAL CA: CPT

## 2023-08-14 PROCEDURE — 3E013GC INTRODUCTION OF OTHER THERAPEUTIC SUBSTANCE INTO SUBCUTANEOUS TISSUE, PERCUTANEOUS APPROACH: ICD-10-PCS | Performed by: INTERNAL MEDICINE

## 2023-08-14 PROCEDURE — 2500000003 HC RX 250 WO HCPCS: Performed by: INTERNAL MEDICINE

## 2023-08-14 PROCEDURE — 93880 EXTRACRANIAL BILAT STUDY: CPT

## 2023-08-14 PROCEDURE — 83735 ASSAY OF MAGNESIUM: CPT

## 2023-08-14 PROCEDURE — 95819 EEG AWAKE AND ASLEEP: CPT | Performed by: PSYCHIATRY & NEUROLOGY

## 2023-08-14 PROCEDURE — 85025 COMPLETE CBC W/AUTO DIFF WBC: CPT

## 2023-08-14 PROCEDURE — 6370000000 HC RX 637 (ALT 250 FOR IP): Performed by: FAMILY MEDICINE

## 2023-08-14 PROCEDURE — 2709999900 HC NON-CHARGEABLE SUPPLY: Performed by: INTERNAL MEDICINE

## 2023-08-14 PROCEDURE — 1100000003 HC PRIVATE W/ TELEMETRY

## 2023-08-14 PROCEDURE — 33285 INSJ SUBQ CAR RHYTHM MNTR: CPT | Performed by: INTERNAL MEDICINE

## 2023-08-14 PROCEDURE — 6360000002 HC RX W HCPCS: Performed by: FAMILY MEDICINE

## 2023-08-14 PROCEDURE — 99232 SBSQ HOSP IP/OBS MODERATE 35: CPT | Performed by: INTERNAL MEDICINE

## 2023-08-14 PROCEDURE — 36415 COLL VENOUS BLD VENIPUNCTURE: CPT

## 2023-08-14 PROCEDURE — C1764 EVENT RECORDER, CARDIAC: HCPCS | Performed by: INTERNAL MEDICINE

## 2023-08-14 PROCEDURE — 95819 EEG AWAKE AND ASLEEP: CPT

## 2023-08-14 PROCEDURE — 0JH632Z INSERTION OF MONITORING DEVICE INTO CHEST SUBCUTANEOUS TISSUE AND FASCIA, PERCUTANEOUS APPROACH: ICD-10-PCS | Performed by: INTERNAL MEDICINE

## 2023-08-14 DEVICE — ICM LNQ22 LINQ II USA
Type: IMPLANTABLE DEVICE | Status: FUNCTIONAL
Brand: LINQ II™

## 2023-08-14 RX ORDER — LIDOCAINE HYDROCHLORIDE AND EPINEPHRINE 10; 10 MG/ML; UG/ML
INJECTION, SOLUTION INFILTRATION; PERINEURAL PRN
Status: DISCONTINUED | OUTPATIENT
Start: 2023-08-14 | End: 2023-08-14 | Stop reason: HOSPADM

## 2023-08-14 RX ADMIN — SODIUM CHLORIDE, PRESERVATIVE FREE 10 ML: 5 INJECTION INTRAVENOUS at 20:48

## 2023-08-14 RX ADMIN — TAMSULOSIN HYDROCHLORIDE 0.4 MG: 0.4 CAPSULE ORAL at 20:17

## 2023-08-14 RX ADMIN — SODIUM CHLORIDE, PRESERVATIVE FREE 10 ML: 5 INJECTION INTRAVENOUS at 17:07

## 2023-08-14 RX ADMIN — TAMSULOSIN HYDROCHLORIDE 0.4 MG: 0.4 CAPSULE ORAL at 10:08

## 2023-08-14 RX ADMIN — VENLAFAXINE HYDROCHLORIDE 150 MG: 150 CAPSULE, EXTENDED RELEASE ORAL at 10:08

## 2023-08-14 RX ADMIN — OXCARBAZEPINE 300 MG: 300 TABLET, FILM COATED ORAL at 20:17

## 2023-08-14 RX ADMIN — FINASTERIDE 5 MG: 5 TABLET, FILM COATED ORAL at 10:08

## 2023-08-14 RX ADMIN — AMLODIPINE BESYLATE 10 MG: 10 TABLET ORAL at 10:09

## 2023-08-14 RX ADMIN — ENOXAPARIN SODIUM 40 MG: 100 INJECTION SUBCUTANEOUS at 17:05

## 2023-08-14 RX ADMIN — DONEPEZIL HYDROCHLORIDE 5 MG: 5 TABLET, FILM COATED ORAL at 10:08

## 2023-08-14 RX ADMIN — OXCARBAZEPINE 300 MG: 300 TABLET, FILM COATED ORAL at 10:13

## 2023-08-14 RX ADMIN — ASPIRIN 81 MG: 81 TABLET ORAL at 10:08

## 2023-08-14 RX ADMIN — LOSARTAN POTASSIUM 50 MG: 50 TABLET, FILM COATED ORAL at 20:17

## 2023-08-14 NOTE — PLAN OF CARE
Problem: Discharge Planning  Goal: Discharge to home or other facility with appropriate resources  Outcome: Progressing  Flowsheets (Taken 8/14/2023 0806)  Discharge to home or other facility with appropriate resources: Identify barriers to discharge with patient and caregiver     Problem: Safety - Adult  Goal: Free from fall injury  Outcome: Progressing  Flowsheets (Taken 8/14/2023 0806)  Free From Fall Injury: Instruct family/caregiver on patient safety     Problem: ABCDS Injury Assessment  Goal: Absence of physical injury  Outcome: Progressing

## 2023-08-14 NOTE — CARE COORDINATION
Pt presented from home via EMS c/o an unresponsive episode. Has a PMHx of severe aortic valve stenosis, HTN, BPH, seizure disorder and obesity. Pt admitted for NSVT. PTA, pt indep with his ADLs. Lives with his spouse in a one-story private residence. On RA. Drives. Fairly active. Has the following DMEs: crutches, RW and a cane. PT/OT consulted and recommended no further skilled therapy needed post discharge. PCP established at the [address] [place] [address] Medicare verified and able to afford home meds. Will remain available. 08/14/23 2463   Service Assessment   Patient Orientation Alert and Oriented   Cognition Alert   History Provided By Patient   Primary Caregiver Self   Support Systems Spouse/Significant Other;Children;Family Members;[employer];Friends/Neighbors   PCP Verified by CM Yes   Prior Functional Level Independent in ADLs/IADLs   Current Functional Level Independent in ADLs/IADLs   Can patient return to prior living arrangement Yes   Ability to make needs known: Good   Family able to assist with home care needs: Yes   Would you like for me to discuss the discharge plan with any other family members/significant others, and if so, who? No   Financial Resources Medicare   Community Resources None   Social/Functional History   Lives With Spouse   Type of [address] [place] [address] Dr One level   [address];[place];Elizabeth Garcia Help From Family   ADL Assistance Independent   Ambulation Assistance Independent   Transfer Assistance Independent   Active  Yes   Mode of Transportation Car   Occupation Retired   Discharge Planning   Type of [address] [place] [address] Prior To Admission None   Potential Assistance Needed N/A   DME Ordered?  No   Potential Assistance Purchasing Medications No   Type of Home Care Services None   Patient expects to be discharged to: [facility] Discharge

## 2023-08-14 NOTE — PROCEDURES
Routine EEG Report    Reason for EEG: Syncope    Scheduled Meds:   sodium chloride flush  5-40 mL IntraVENous 2 times per day    enoxaparin  40 mg SubCUTAneous Q24H    OXcarbazepine  300 mg Oral BID    tamsulosin  0.4 mg Oral BID    venlafaxine  150 mg Oral Daily with breakfast    aspirin  81 mg Oral Daily    amLODIPine  10 mg Oral Daily    finasteride  5 mg Oral Daily    donepezil  5 mg Oral QAM    losartan  50 mg Oral Nightly     Continuous Infusions:   sodium chloride       PRN Meds:.sodium chloride flush, sodium chloride, ondansetron **OR** ondansetron, polyethylene glycol, acetaminophen **OR** acetaminophen, potassium chloride **OR** potassium alternative oral replacement **OR** potassium chloride, magnesium sulfate, metoprolol, LORazepam, aluminum & magnesium hydroxide-simethicone      Technical Summary: This EEG was performed with a 32-channel digital EEG machine with electrodes placed according to the international 10-20 system of placement. Background:   During the maximal awake state a posterior dominant rhythm of 9 Hz was seen. It was well regulated and well sustained, symmetric, and reactive to eye opening. Anterior background consisted of medium amplitude activity in the alpha range with occasional intermixed beta frequencies. Hyperventilation: Hyperventilation was not performed due to medical condition    Photic Stimulation: Photic stimulation was performed at various flash frequencies and no abnormalites were seen. Sleep: None    Impression: This EEG is normal in the state. No interictal epileptiform discharges or lateralizing features were seen.

## 2023-08-15 VITALS
RESPIRATION RATE: 18 BRPM | TEMPERATURE: 98.2 F | DIASTOLIC BLOOD PRESSURE: 66 MMHG | HEIGHT: 66 IN | SYSTOLIC BLOOD PRESSURE: 135 MMHG | HEART RATE: 69 BPM | WEIGHT: 179.5 LBS | OXYGEN SATURATION: 94 % | BODY MASS INDEX: 28.85 KG/M2

## 2023-08-15 PROCEDURE — 2580000003 HC RX 258: Performed by: FAMILY MEDICINE

## 2023-08-15 PROCEDURE — 99232 SBSQ HOSP IP/OBS MODERATE 35: CPT | Performed by: NURSE PRACTITIONER

## 2023-08-15 PROCEDURE — 6370000000 HC RX 637 (ALT 250 FOR IP): Performed by: FAMILY MEDICINE

## 2023-08-15 RX ADMIN — SODIUM CHLORIDE, PRESERVATIVE FREE 10 ML: 5 INJECTION INTRAVENOUS at 08:17

## 2023-08-15 RX ADMIN — OXCARBAZEPINE 300 MG: 300 TABLET, FILM COATED ORAL at 08:16

## 2023-08-15 RX ADMIN — AMLODIPINE BESYLATE 10 MG: 10 TABLET ORAL at 08:16

## 2023-08-15 RX ADMIN — FINASTERIDE 5 MG: 5 TABLET, FILM COATED ORAL at 08:16

## 2023-08-15 RX ADMIN — ASPIRIN 81 MG: 81 TABLET ORAL at 08:16

## 2023-08-15 RX ADMIN — DONEPEZIL HYDROCHLORIDE 5 MG: 5 TABLET, FILM COATED ORAL at 08:16

## 2023-08-15 RX ADMIN — VENLAFAXINE HYDROCHLORIDE 150 MG: 150 CAPSULE, EXTENDED RELEASE ORAL at 08:16

## 2023-08-15 RX ADMIN — TAMSULOSIN HYDROCHLORIDE 0.4 MG: 0.4 CAPSULE ORAL at 08:16

## 2023-08-15 NOTE — CARE COORDINATION
Discharge order is in. Pt is discharging home today in stable condition. No discharge needs identified. Tx goals met. 08/15/23 0902   Services At/After Discharge   Transition of Care Consult (CM Consult) Discharge Planning   Services At/After Discharge None   [place] Resource Information Provided? No   Mode of Transport at Discharge Other (see comment)   Confirm Follow Up Transport Family   Condition of Participation: Discharge Planning   The Patient and/or Patient Representative was provided with a Choice of Provider? Patient   The Patient and/Or Patient Representative agree with the Discharge Plan? Yes   Freedom of Choice list was provided with basic dialogue that supports the patient's individualized plan of care/goals, treatment preferences, and shares the quality data associated with the providers?   Yes

## 2023-08-15 NOTE — PLAN OF CARE
Problem: Discharge Planning  Goal: Discharge to home or other facility with appropriate resources  Outcome: Adequate for Discharge  Flowsheets (Taken 8/14/2023 2017 by Beverly Farrell, RN)  Discharge to home or other facility with appropriate resources: Identify barriers to discharge with patient and caregiver     Problem: Safety - Adult  Goal: Free from fall injury  Outcome: Adequate for Discharge  Flowsheets (Taken 8/14/2023 2017 by Beverly Farrell, RN)  Free From Fall Injury: Instruct family/caregiver on patient safety     Problem: ABCDS Injury Assessment  Goal: Absence of physical injury  Outcome: Adequate for Discharge  Flowsheets (Taken 8/14/2023 2017 by Beverly Farrell, RN)  Absence of Physical Injury: Implement safety measures based on patient assessment

## 2023-08-15 NOTE — DISCHARGE SUMMARY
Hospitalist Discharge Summary   Admit Date:  2023  1:27 PM   DC Note date: 8/15/2023  Name:  Salena Rubalcava   Age:  74 y.o. Sex:  male  :  1950   MRN:  [id number]   Room:  [facility]  PCP:  NOT ON FILE    Presenting Complaint: Seizures     Initial Admission Diagnosis: Seizure disorder ([address]) [G40.909]  NSVT (nonsustained ventricular tachycardia) (HCC) [I47.29]  Non-sustained ventricular tachycardia (HCC) [I47.29]     Problem List for this Hospitalization (present on admission):    Principal Problem:    NSVT (nonsustained ventricular tachycardia) ([address])  Active Problems:    Hypertension    Carotid atherosclerosis, bilateral    History of transcatheter aortic valve replacement (TAVR)    Episode of unresponsiveness    LBBB (left bundle branch block)    Seizure disorder (HCC)    Bilateral carotid artery disease ([address])    Syncope  Resolved Problems:    * No resolved hospital problems. *      Hospital Course:  Salena Rubalcava is a 74 y.o. male with medical history significant for  severe aortic valve stenosis s/p TAVR, HTN, BPH, Seizure disorder, obesity who was admitted following an unresponsive episode. Per report, pt had his eyes open but not responsive then thought to have post-ictal period where he responded but slightly in appropriately. Per wife, blank stare, not responding, profusely sweating. Patient was aware of events occurring but could not talk and couldn't function. In ED, he was A&Ox4 on arrival and somewhat off but improved back to baseline. History of seizures on trileptal but prior seizures described as grand mal. Last seizure, many years ago. While in the ED, had a run of V tach but asymptomatic. EKGs with sinus rhythm and LBBB. HS trop 15.4. CTH w/o acute findings. Patient admitted for further work-up for syncope. Neurology consulted and recommend events most consistent with syncope, unlikely seizure.   EEG was normal.  Neurology recommend to continue Aricept and

## 2023-08-16 LAB — OXCARBAZEPINE SERPL-MCNC: 10 UG/ML (ref 10–35)

## 2023-08-28 ENCOUNTER — NURSE ONLY (OUTPATIENT)
Age: 73
End: 2023-08-28

## 2023-08-28 DIAGNOSIS — I47.29 NSVT (NONSUSTAINED VENTRICULAR TACHYCARDIA) (HCC): Primary | ICD-10-CM

## 2023-08-30 ENCOUNTER — HOSPITAL ENCOUNTER (OUTPATIENT)
Dept: SLEEP CENTER | Age: 73
Discharge: HOME | End: 2023-09-02

## 2023-09-21 ENCOUNTER — TELEPHONE (OUTPATIENT)
Age: 73
End: 2023-09-21

## 2023-09-21 ENCOUNTER — APPOINTMENT (OUTPATIENT)
Dept: MRI IMAGING | Age: 73
End: 2023-09-21
Payer: MEDICARE

## 2023-09-21 ENCOUNTER — APPOINTMENT (OUTPATIENT)
Dept: CT IMAGING | Age: 73
End: 2023-09-21
Payer: MEDICARE

## 2023-09-21 ENCOUNTER — HOSPITAL ENCOUNTER (OUTPATIENT)
Age: 73
Setting detail: OBSERVATION
Discharge: HOME | End: 2023-09-22
Attending: EMERGENCY MEDICINE | Admitting: STUDENT IN AN ORGANIZED HEALTH CARE EDUCATION/TRAINING PROGRAM
Payer: MEDICARE

## 2023-09-21 DIAGNOSIS — G45.9 TRANSIENT CEREBRAL ISCHEMIA, UNSPECIFIED TYPE: ICD-10-CM

## 2023-09-21 DIAGNOSIS — G40.909 SEIZURE DISORDER (HCC): Chronic | ICD-10-CM

## 2023-09-21 DIAGNOSIS — M17.11 PRIMARY OSTEOARTHRITIS OF RIGHT KNEE: ICD-10-CM

## 2023-09-21 DIAGNOSIS — R41.82 ALTERED MENTAL STATUS, UNSPECIFIED ALTERED MENTAL STATUS TYPE: Primary | ICD-10-CM

## 2023-09-21 LAB
ALBUMIN SERPL-MCNC: 4 G/DL (ref 3.2–4.6)
ALBUMIN/GLOB SERPL: 1.1 (ref 0.4–1.6)
ALP SERPL-CCNC: 128 U/L (ref 50–136)
ALT SERPL-CCNC: 25 U/L (ref 12–65)
ANION GAP SERPL CALC-SCNC: 5 MMOL/L (ref 2–11)
AST SERPL-CCNC: 16 U/L (ref 15–37)
BASOPHILS # BLD: 0 K/UL (ref 0–0.2)
BASOPHILS NFR BLD: 1 % (ref 0–2)
BILIRUB SERPL-MCNC: 0.5 MG/DL (ref 0.2–1.1)
BUN SERPL-MCNC: 15 MG/DL (ref 8–23)
CALCIUM SERPL-MCNC: 9 MG/DL (ref 8.3–10.4)
CHLORIDE SERPL-SCNC: 104 MMOL/L (ref 101–110)
CHOLEST SERPL-MCNC: 171 MG/DL
CO2 SERPL-SCNC: 28 MMOL/L (ref 21–32)
CREAT SERPL-MCNC: 1 MG/DL (ref 0.8–1.5)
DIFFERENTIAL METHOD BLD: ABNORMAL
EOSINOPHIL # BLD: 0 K/UL (ref 0–0.8)
EOSINOPHIL NFR BLD: 0 % (ref 0.5–7.8)
ERYTHROCYTE [DISTWIDTH] IN BLOOD BY AUTOMATED COUNT: 13.7 % (ref 11.9–14.6)
EST. AVERAGE GLUCOSE BLD GHB EST-MCNC: 117 MG/DL
GLOBULIN SER CALC-MCNC: 3.6 G/DL (ref 2.8–4.5)
GLUCOSE BLD STRIP.AUTO-MCNC: 131 MG/DL (ref 65–100)
GLUCOSE SERPL-MCNC: 129 MG/DL (ref 65–100)
HBA1C MFR BLD: 5.7 % (ref 4.8–5.6)
HCT VFR BLD AUTO: 42.7 % (ref 41.1–50.3)
HDLC SERPL-MCNC: 97 MG/DL (ref 40–60)
HDLC SERPL: 1.8
HGB BLD-MCNC: 14.2 G/DL (ref 13.6–17.2)
IMM GRANULOCYTES # BLD AUTO: 0 K/UL (ref 0–0.5)
IMM GRANULOCYTES NFR BLD AUTO: 0 % (ref 0–5)
INR BLD: <0.9 (ref 0.9–1.2)
INR PPP: 1
LDLC SERPL CALC-MCNC: 69.4 MG/DL
LYMPHOCYTES # BLD: 0.7 K/UL (ref 0.5–4.6)
LYMPHOCYTES NFR BLD: 11 % (ref 13–44)
MCH RBC QN AUTO: 32.1 PG (ref 26.1–32.9)
MCHC RBC AUTO-ENTMCNC: 33.3 G/DL (ref 31.4–35)
MCV RBC AUTO: 96.4 FL (ref 82–102)
MONOCYTES # BLD: 0.3 K/UL (ref 0.1–1.3)
MONOCYTES NFR BLD: 5 % (ref 4–12)
NEUTS SEG # BLD: 5.5 K/UL (ref 1.7–8.2)
NEUTS SEG NFR BLD: 83 % (ref 43–78)
NRBC # BLD: 0 K/UL (ref 0–0.2)
PLATELET # BLD AUTO: 272 K/UL (ref 150–450)
PMV BLD AUTO: 8.6 FL (ref 9.4–12.3)
POTASSIUM SERPL-SCNC: 4.9 MMOL/L (ref 3.5–5.1)
PROT SERPL-MCNC: 7.6 G/DL (ref 6.3–8.2)
PROTHROMBIN TIME: 13.6 SEC (ref 12.6–14.3)
PT BLD: ABNORMAL SECS (ref 9.6–11.6)
RBC # BLD AUTO: 4.43 M/UL (ref 4.23–5.6)
SERVICE CMNT-IMP: ABNORMAL
SODIUM SERPL-SCNC: 137 MMOL/L (ref 133–143)
TRIGL SERPL-MCNC: 23 MG/DL (ref 35–150)
VLDLC SERPL CALC-MCNC: 4.6 MG/DL (ref 6–23)
WBC # BLD AUTO: 6.7 K/UL (ref 4.3–11.1)

## 2023-09-21 PROCEDURE — 80053 COMPREHEN METABOLIC PANEL: CPT

## 2023-09-21 PROCEDURE — 70498 CT ANGIOGRAPHY NECK: CPT

## 2023-09-21 PROCEDURE — 6360000004 HC RX CONTRAST MEDICATION: Performed by: EMERGENCY MEDICINE

## 2023-09-21 PROCEDURE — 85025 COMPLETE CBC W/AUTO DIFF WBC: CPT

## 2023-09-21 PROCEDURE — 70450 CT HEAD/BRAIN W/O DYE: CPT

## 2023-09-21 PROCEDURE — 2580000003 HC RX 258: Performed by: STUDENT IN AN ORGANIZED HEALTH CARE EDUCATION/TRAINING PROGRAM

## 2023-09-21 PROCEDURE — 83036 HEMOGLOBIN GLYCOSYLATED A1C: CPT

## 2023-09-21 PROCEDURE — 6370000000 HC RX 637 (ALT 250 FOR IP): Performed by: STUDENT IN AN ORGANIZED HEALTH CARE EDUCATION/TRAINING PROGRAM

## 2023-09-21 PROCEDURE — 80061 LIPID PANEL: CPT

## 2023-09-21 PROCEDURE — 6360000002 HC RX W HCPCS: Performed by: STUDENT IN AN ORGANIZED HEALTH CARE EDUCATION/TRAINING PROGRAM

## 2023-09-21 PROCEDURE — 82962 GLUCOSE BLOOD TEST: CPT

## 2023-09-21 PROCEDURE — 80183 DRUG SCRN QUANT OXCARBAZEPIN: CPT

## 2023-09-21 PROCEDURE — G0378 HOSPITAL OBSERVATION PER HR: HCPCS

## 2023-09-21 PROCEDURE — 70551 MRI BRAIN STEM W/O DYE: CPT

## 2023-09-21 PROCEDURE — 96372 THER/PROPH/DIAG INJ SC/IM: CPT

## 2023-09-21 PROCEDURE — 85610 PROTHROMBIN TIME: CPT

## 2023-09-21 PROCEDURE — 99222 1ST HOSP IP/OBS MODERATE 55: CPT | Performed by: STUDENT IN AN ORGANIZED HEALTH CARE EDUCATION/TRAINING PROGRAM

## 2023-09-21 PROCEDURE — 93005 ELECTROCARDIOGRAM TRACING: CPT | Performed by: EMERGENCY MEDICINE

## 2023-09-21 PROCEDURE — 99285 EMERGENCY DEPT VISIT HI MDM: CPT

## 2023-09-21 RX ORDER — OXCARBAZEPINE 300 MG/1
300 TABLET, FILM COATED ORAL 2 TIMES DAILY
Status: DISCONTINUED | OUTPATIENT
Start: 2023-09-21 | End: 2023-09-22 | Stop reason: HOSPADM

## 2023-09-21 RX ORDER — TAMSULOSIN HYDROCHLORIDE 0.4 MG/1
0.4 CAPSULE ORAL 2 TIMES DAILY
Status: DISCONTINUED | OUTPATIENT
Start: 2023-09-21 | End: 2023-09-22 | Stop reason: HOSPADM

## 2023-09-21 RX ORDER — SODIUM CHLORIDE 9 MG/ML
INJECTION, SOLUTION INTRAVENOUS PRN
Status: DISCONTINUED | OUTPATIENT
Start: 2023-09-21 | End: 2023-09-22 | Stop reason: HOSPADM

## 2023-09-21 RX ORDER — OXYCODONE HYDROCHLORIDE 5 MG/1
5 TABLET ORAL EVERY 4 HOURS PRN
Status: DISCONTINUED | OUTPATIENT
Start: 2023-09-21 | End: 2023-09-22 | Stop reason: HOSPADM

## 2023-09-21 RX ORDER — ATORVASTATIN CALCIUM 40 MG/1
20 TABLET, FILM COATED ORAL ONCE
Status: COMPLETED | OUTPATIENT
Start: 2023-09-21 | End: 2023-09-21

## 2023-09-21 RX ORDER — AMLODIPINE BESYLATE 10 MG/1
10 TABLET ORAL DAILY
Status: DISCONTINUED | OUTPATIENT
Start: 2023-09-22 | End: 2023-09-22 | Stop reason: HOSPADM

## 2023-09-21 RX ORDER — HYDRALAZINE HYDROCHLORIDE 25 MG/1
25 TABLET, FILM COATED ORAL EVERY 6 HOURS PRN
Status: DISCONTINUED | OUTPATIENT
Start: 2023-09-21 | End: 2023-09-22 | Stop reason: HOSPADM

## 2023-09-21 RX ORDER — LOSARTAN POTASSIUM 50 MG/1
50 TABLET ORAL NIGHTLY
Status: DISCONTINUED | OUTPATIENT
Start: 2023-09-21 | End: 2023-09-22 | Stop reason: HOSPADM

## 2023-09-21 RX ORDER — ASPIRIN 81 MG/1
81 TABLET, CHEWABLE ORAL ONCE
Status: COMPLETED | OUTPATIENT
Start: 2023-09-21 | End: 2023-09-21

## 2023-09-21 RX ORDER — FINASTERIDE 5 MG/1
5 TABLET, FILM COATED ORAL DAILY
Status: DISCONTINUED | OUTPATIENT
Start: 2023-09-22 | End: 2023-09-22 | Stop reason: HOSPADM

## 2023-09-21 RX ORDER — PROMETHAZINE HYDROCHLORIDE 12.5 MG/1
12.5 TABLET ORAL EVERY 6 HOURS PRN
Status: DISCONTINUED | OUTPATIENT
Start: 2023-09-21 | End: 2023-09-22 | Stop reason: HOSPADM

## 2023-09-21 RX ORDER — SODIUM CHLORIDE 0.9 % (FLUSH) 0.9 %
5-40 SYRINGE (ML) INJECTION PRN
Status: DISCONTINUED | OUTPATIENT
Start: 2023-09-21 | End: 2023-09-22 | Stop reason: HOSPADM

## 2023-09-21 RX ORDER — DONEPEZIL HYDROCHLORIDE 5 MG/1
5 TABLET, FILM COATED ORAL EVERY MORNING
Status: DISCONTINUED | OUTPATIENT
Start: 2023-09-22 | End: 2023-09-22 | Stop reason: HOSPADM

## 2023-09-21 RX ORDER — SODIUM CHLORIDE 0.9 % (FLUSH) 0.9 %
5-40 SYRINGE (ML) INJECTION EVERY 12 HOURS SCHEDULED
Status: DISCONTINUED | OUTPATIENT
Start: 2023-09-21 | End: 2023-09-22 | Stop reason: HOSPADM

## 2023-09-21 RX ORDER — ENOXAPARIN SODIUM 100 MG/ML
40 INJECTION SUBCUTANEOUS EVERY 24 HOURS
Status: DISCONTINUED | OUTPATIENT
Start: 2023-09-21 | End: 2023-09-22 | Stop reason: HOSPADM

## 2023-09-21 RX ORDER — VENLAFAXINE HYDROCHLORIDE 150 MG/1
150 CAPSULE, EXTENDED RELEASE ORAL DAILY
Status: DISCONTINUED | OUTPATIENT
Start: 2023-09-22 | End: 2023-09-22 | Stop reason: HOSPADM

## 2023-09-21 RX ORDER — ONDANSETRON 2 MG/ML
4 INJECTION INTRAMUSCULAR; INTRAVENOUS EVERY 6 HOURS PRN
Status: DISCONTINUED | OUTPATIENT
Start: 2023-09-21 | End: 2023-09-22 | Stop reason: HOSPADM

## 2023-09-21 RX ORDER — ATORVASTATIN CALCIUM 40 MG/1
40 TABLET, FILM COATED ORAL NIGHTLY
Status: DISCONTINUED | OUTPATIENT
Start: 2023-09-22 | End: 2023-09-22 | Stop reason: HOSPADM

## 2023-09-21 RX ORDER — POLYETHYLENE GLYCOL 3350 17 G/17G
17 POWDER, FOR SOLUTION ORAL DAILY PRN
Status: DISCONTINUED | OUTPATIENT
Start: 2023-09-21 | End: 2023-09-22 | Stop reason: HOSPADM

## 2023-09-21 RX ORDER — ASPIRIN 81 MG/1
81 TABLET ORAL
Status: DISCONTINUED | OUTPATIENT
Start: 2023-09-21 | End: 2023-09-22 | Stop reason: HOSPADM

## 2023-09-21 RX ORDER — ACETAMINOPHEN 325 MG/1
650 TABLET ORAL EVERY 6 HOURS PRN
Status: DISCONTINUED | OUTPATIENT
Start: 2023-09-21 | End: 2023-09-22 | Stop reason: HOSPADM

## 2023-09-21 RX ADMIN — ASPIRIN 81 MG: 81 TABLET, CHEWABLE ORAL at 19:24

## 2023-09-21 RX ADMIN — ATORVASTATIN CALCIUM 20 MG: 40 TABLET, FILM COATED ORAL at 19:24

## 2023-09-21 RX ADMIN — IOPAMIDOL 50 ML: 755 INJECTION, SOLUTION INTRAVENOUS at 17:26

## 2023-09-21 RX ADMIN — SODIUM CHLORIDE, PRESERVATIVE FREE 10 ML: 5 INJECTION INTRAVENOUS at 22:01

## 2023-09-21 RX ADMIN — LOSARTAN POTASSIUM 50 MG: 50 TABLET, FILM COATED ORAL at 22:00

## 2023-09-21 RX ADMIN — TAMSULOSIN HYDROCHLORIDE 0.4 MG: 0.4 CAPSULE ORAL at 22:00

## 2023-09-21 RX ADMIN — ENOXAPARIN SODIUM 40 MG: 100 INJECTION SUBCUTANEOUS at 22:01

## 2023-09-21 RX ADMIN — OXCARBAZEPINE 300 MG: 300 TABLET, FILM COATED ORAL at 22:00

## 2023-09-21 ASSESSMENT — ENCOUNTER SYMPTOMS
NAUSEA: 0
CHOKING: 0
SHORTNESS OF BREATH: 0
EYE REDNESS: 0
COLOR CHANGE: 0
CHEST TIGHTNESS: 0
STRIDOR: 0
RECTAL PAIN: 0
PHOTOPHOBIA: 0
BACK PAIN: 0
ABDOMINAL PAIN: 0
VOICE CHANGE: 0
ANAL BLEEDING: 0

## 2023-09-21 ASSESSMENT — PAIN DESCRIPTION - FREQUENCY: FREQUENCY: CONTINUOUS

## 2023-09-21 ASSESSMENT — LIFESTYLE VARIABLES
HOW OFTEN DO YOU HAVE A DRINK CONTAINING ALCOHOL: NEVER
HOW MANY STANDARD DRINKS CONTAINING ALCOHOL DO YOU HAVE ON A TYPICAL DAY: PATIENT DOES NOT DRINK

## 2023-09-21 ASSESSMENT — PAIN DESCRIPTION - DESCRIPTORS: DESCRIPTORS: ACHING;DISCOMFORT

## 2023-09-21 ASSESSMENT — PAIN - FUNCTIONAL ASSESSMENT: PAIN_FUNCTIONAL_ASSESSMENT: 0-10

## 2023-09-21 ASSESSMENT — PAIN DESCRIPTION - LOCATION: LOCATION: HEAD

## 2023-09-21 ASSESSMENT — PAIN SCALES - GENERAL: PAINLEVEL_OUTOF10: 5

## 2023-09-21 ASSESSMENT — PAIN DESCRIPTION - PAIN TYPE: TYPE: ACUTE PAIN

## 2023-09-21 NOTE — TELEPHONE ENCOUNTER
Pt is having issues and wonders if it could be his loop recorder. Pt's wife reports the pt has been tachy, sweating profusely and is disoriented. Pt's wife also reports that the pt has lost a few hours of his memory from today. Denies SOB.

## 2023-09-21 NOTE — ED NOTES
Assumed care of pt at this time. Pt reports h/a (3/10). Pt denies chest pain and discomfort. A&O 4/4. GCS 15. Speech clear. Pt in CT now.       Kj Kaufman RN  09/21/23 8197

## 2023-09-21 NOTE — ED NOTES
Pt denies discomfort, questions or concerns. Pt resting quietly in bed, attached to cardiac monitor. Neurology at bedside.       Richie Gonzalez RN  09/21/23 6308

## 2023-09-21 NOTE — ED TRIAGE NOTES
Pt reports intermittent migraines has had one for a few hours radiating to posterior R neck (-)vision changes    Reports recently in hospital for \"ventricular tachycardia\" and had episode today of short term memory loss/confusion. Per spouse pt went outside around 1200 and returned around 1400 and wasn't sure why he went outside, asked where he was and what day it was.      Pt oriented to situation, place, time, person    (-)numbness, facial asymmetry, weakness, drift, slurred speech, ataxia
Initial (On Arrival)

## 2023-09-21 NOTE — CONSULTS
Consult    Patient: Umesh Sanchez MRN: [id number]     YOB: 1950  Age: 74 y.o. Sex: male      Subjective:      Umesh Sanchez is a 74 y.o. male who is being seen for code S. The patient was last known normal at 100pm.     The patient presented to [facility] ED. A code S was called at 518pm. Neurology arrived to the bedside at 521pm. Initial NIHSS was 0. A CT of the head was obtained and pending. Brief history obtained from patient and wife. Report around 1:00 PM patient became disoriented asking where he was and not knowing how he got there. Apparently this is happened 1 time previously but the wife cannot specify when. There is a seizure disorder that is listed in his chart. Past Medical History:   Diagnosis Date    Arthritis     Cancer ([address])     skin cancer basal cell    History of PFTs 09/06/2022    History of transesophageal echocardiography (ZURDO) 09/02/2022    EF of 50 - 55%;  Moderate regurgitation mitral & tricuspid; severe aortic stenosis    Hypertension     Neck injury 01/25/2022    fractured C2; reports neck brace and no surgery    Seizures ([address]) 1995    Sleep apnea      Past Surgical History:   Procedure Laterality Date    ABDOMEN SURGERY      repaired chain saw laceration    CARDIAC PROCEDURE N/A 09/02/2022    Left heart cath / coronary angiography performed by Fabiola Figueroa MD at [address] [facility] [address] N/A 09/27/2022    Transcatheter aortic valve replacement performed by Saintclair Haggard, MD at [address] [facility] N/A 8/14/2023    Loop recorder insert performed by Ryan Villanueva MD at [facility] CARDIAC CATH LAB    INVASIVE VASCULAR N/A 09/02/2022    Angiography lower ext bilat performed by Fabiola Figueroa MD at [address] [facility] LAB    ORTHOPEDIC SURGERY Left 2015    left tibia fx and kelsey placed    ORTHOPEDIC SURGERY Right     rotator cuff repair    FL UNLISTED PROCEDURE ABDOMEN PERITONEUM & OMENTUM      SHOULDER SURGERY Left shoulder fracture - 2 screws placed    TOTAL KNEE ARTHROPLASTY Right 5/9/2023    KNEE TOTAL ARTHROPLASTY ROBOTIC-RIGHT VELYS SDD performed by Rodrigo Leahy MD at [address]      double hernia repair      Family History   Problem Relation Age of Onset    Cancer Father     High Blood Pressure Paternal Uncle     Diabetes Paternal Uncle      Social History     Tobacco Use    Smoking status: Never    Smokeless tobacco: Former     Types: Chew     Quit date: 1980   Substance Use Topics    Alcohol use: No      Current Facility-Administered Medications   Medication Dose Route Frequency Provider Last Rate Last Admin    iopamidol (ISOVUE-370) 76 % injection 50 mL  50 mL IntraVENous ONCE PRN Lanie Black MD         Current Outpatient Medications   Medication Sig Dispense Refill    amLODIPine (NORVASC) 10 MG tablet Take 1 tablet by mouth daily      losartan (COZAAR) 50 MG tablet Take 1 tablet by mouth nightly      finasteride (PROSCAR) 5 MG tablet Take 1 tablet by mouth daily      oxyCODONE (ROXICODONE) 5 MG immediate release tablet Take 5-10 mg by mouth every 4 hours as needed for Pain. acetaminophen (TYLENOL) 500 MG tablet Take 2 tablets by mouth every 6 hours as needed (breakthrough pain)      polyethylene glycol (GLYCOLAX) 17 GM/SCOOP powder Take 17 g by mouth daily      methocarbamol (ROBAXIN-750) 750 MG tablet Take 1 tablet by mouth every 6 hours as needed for spasms. 40 tablet 0    ondansetron (ZOFRAN) 4 MG tablet Take 1 tablet by mouth every 6 hours as needed for Nausea or Vomiting 30 tablet 0    celecoxib (CELEBREX) 200 MG capsule Take 1 capsule by mouth daily 30 capsule 0    aspirin EC 81 MG EC tablet Take 1 tablet by mouth in the morning and 1 tablet in the evening.  60 tablet 0    tamsulosin (FLOMAX) 0.4 MG capsule Take 1 capsule by mouth in the morning and at bedtime Indications: evening dose at 6 pm      venlafaxine (EFFEXOR XR) 150 MG extended release capsule Take 1

## 2023-09-21 NOTE — ED PROVIDER NOTES
Auto Differential    Comprehensive Metabolic Panel    Protime-INR    Lipid Panel    Hemoglobin A1C    Oxcarbazepine Level    Diet NPO    NIHSS    Nursing swallow assessment    POCT Glucose    POCT INR    POCT Glucose    EKG 12 Lead    EEG        Medications given during this emergency department visit:  Medications   iopamidol (ISOVUE-370) 76 % injection 50 mL (50 mLs IntraVENous Given 9/21/23 1726)   aspirin chewable tablet 81 mg (81 mg Oral Given 9/21/23 1924)   atorvastatin (LIPITOR) tablet 20 mg (20 mg Oral Given 9/21/23 1924)       New Prescriptions    No medications on file        Past Medical History:   Diagnosis Date    Arthritis     Cancer ([address])     skin cancer basal cell    History of PFTs 09/06/2022    History of transesophageal echocardiography (ZURDO) 09/02/2022    EF of 50 - 55%;  Moderate regurgitation mitral & tricuspid; severe aortic stenosis    Hypertension     Neck injury 01/25/2022    fractured C2; reports neck brace and no surgery    Seizures ([address]) 1995    Sleep apnea         Past Surgical History:   Procedure Laterality Date    ABDOMEN SURGERY      repaired chain saw laceration    CARDIAC PROCEDURE N/A 09/02/2022    Left heart cath / coronary angiography performed by Randy Guerrero MD at [address] [facility] N/A 09/27/2022    Transcatheter aortic valve replacement performed by Jesus Cruz MD at [facility] MAIN OR    EP DEVICE PROCEDURE N/A 8/14/2023    Loop recorder insert performed by Daina Singh MD at [facility] CARDIAC CATH LAB    INVASIVE VASCULAR N/A 09/02/2022    Angiography lower ext bilat performed by Randy Guerrero MD at [address] [facility] LAB    ORTHOPEDIC SURGERY Left 2015    left tibia fx and kelsey placed    ORTHOPEDIC SURGERY Right     rotator cuff repair    MN UNLISTED PROCEDURE ABDOMEN PERITONEUM & OMENTUM      SHOULDER SURGERY Left     shoulder fracture - 2 screws placed    TOTAL KNEE ARTHROPLASTY Right 5/9/2023    KNEE TOTAL ARTHROPLASTY ROBOTIC-RIGHT 6.7 4.3 - 11.1 K/uL    RBC 4.43 4.23 - 5.6 M/uL    Hemoglobin 14.2 13.6 - 17.2 g/dL    Hematocrit 42.7 41.1 - 50.3 %    MCV 96.4 82 - 102 FL    MCH 32.1 26.1 - 32.9 PG    MCHC 33.3 31.4 - 35.0 g/dL    RDW 13.7 11.9 - 14.6 %    Platelets 186 820 - 673 K/uL    MPV 8.6 (L) 9.4 - 12.3 FL    nRBC 0.00 0.0 - 0.2 K/uL    Differential Type AUTOMATED      Neutrophils % 83 (H) 43 - 78 %    Lymphocytes % 11 (L) 13 - 44 %    Monocytes % 5 4.0 - 12.0 %    Eosinophils % 0 (L) 0.5 - 7.8 %    Basophils % 1 0.0 - 2.0 %    Immature Granulocytes 0 0.0 - 5.0 %    Neutrophils Absolute 5.5 1.7 - 8.2 K/UL    Lymphocytes Absolute 0.7 0.5 - 4.6 K/UL    Monocytes Absolute 0.3 0.1 - 1.3 K/UL    Eosinophils Absolute 0.0 0.0 - 0.8 K/UL    Basophils Absolute 0.0 0.0 - 0.2 K/UL    Absolute Immature Granulocyte 0.0 0.0 - 0.5 K/UL   Comprehensive Metabolic Panel   Result Value Ref Range    Sodium 137 133 - 143 mmol/L    Potassium 4.9 3.5 - 5.1 mmol/L    Chloride 104 101 - 110 mmol/L    CO2 28 21 - 32 mmol/L    Anion Gap 5 2 - 11 mmol/L    Glucose 129 (H) 65 - 100 mg/dL    BUN 15 8 - 23 MG/DL    Creatinine 1.00 0.8 - 1.5 MG/DL    Est, Glom Filt Rate >60 >60 ml/min/1.73m2    Calcium 9.0 8.3 - 10.4 MG/DL    Total Bilirubin 0.5 0.2 - 1.1 MG/DL    ALT 25 12 - 65 U/L    AST 16 15 - 37 U/L    Alk Phosphatase 128 50 - 136 U/L    Total Protein 7.6 6.3 - 8.2 g/dL    Albumin 4.0 3.2 - 4.6 g/dL    Globulin 3.6 2.8 - 4.5 g/dL    Albumin/Globulin Ratio 1.1 0.4 - 1.6     Protime-INR   Result Value Ref Range    Protime 13.6 12.6 - 14.3 sec    INR 1.0     Lipid Panel   Result Value Ref Range    Cholesterol, Total 171 <200 MG/DL    Triglycerides 23 (L) 35 - 150 MG/DL    HDL 97 (H) 40 - 60 MG/DL    LDL Calculated 69.4 <100 MG/DL    VLDL Cholesterol Calculated 4.6 (L) 6.0 - 23.0 MG/DL    Chol/HDL Ratio 1.8     Hemoglobin A1C   Result Value Ref Range    Hemoglobin A1C 5.7 (H) 4.8 - 5.6 %    eAG 117 mg/dL   POCT INR   Result Value Ref Range    POC Protime Cannot be

## 2023-09-21 NOTE — TELEPHONE ENCOUNTER
Pt.was sweating profusely earlier,loss of memory lost a few hours of time today and HR is rapid. I advised wife call 911 and get him to the hospital for evaluation. Wife v/u.

## 2023-09-22 VITALS
SYSTOLIC BLOOD PRESSURE: 145 MMHG | DIASTOLIC BLOOD PRESSURE: 79 MMHG | HEART RATE: 83 BPM | OXYGEN SATURATION: 94 % | RESPIRATION RATE: 18 BRPM | BODY MASS INDEX: 28.85 KG/M2 | HEIGHT: 66 IN | TEMPERATURE: 98.6 F | WEIGHT: 179.5 LBS

## 2023-09-22 PROBLEM — G45.4 TRANSIENT GLOBAL AMNESIA: Status: ACTIVE | Noted: 2023-09-22

## 2023-09-22 PROBLEM — G93.41 ACUTE METABOLIC ENCEPHALOPATHY: Status: ACTIVE | Noted: 2023-09-22

## 2023-09-22 LAB
ANION GAP SERPL CALC-SCNC: 1 MMOL/L (ref 2–11)
BASOPHILS # BLD: 0 K/UL (ref 0–0.2)
BASOPHILS NFR BLD: 1 % (ref 0–2)
BUN SERPL-MCNC: 14 MG/DL (ref 8–23)
CALCIUM SERPL-MCNC: 8.5 MG/DL (ref 8.3–10.4)
CHLORIDE SERPL-SCNC: 111 MMOL/L (ref 101–110)
CO2 SERPL-SCNC: 31 MMOL/L (ref 21–32)
CREAT SERPL-MCNC: 0.8 MG/DL (ref 0.8–1.5)
DIFFERENTIAL METHOD BLD: ABNORMAL
EKG ATRIAL RATE: 99 BPM
EKG DIAGNOSIS: NORMAL
EKG P AXIS: 48 DEGREES
EKG P-R INTERVAL: 150 MS
EKG Q-T INTERVAL: 348 MS
EKG QRS DURATION: 120 MS
EKG QTC CALCULATION (BAZETT): 446 MS
EKG R AXIS: -23 DEGREES
EKG T AXIS: 123 DEGREES
EKG VENTRICULAR RATE: 99 BPM
EOSINOPHIL # BLD: 0.1 K/UL (ref 0–0.8)
EOSINOPHIL NFR BLD: 1 % (ref 0.5–7.8)
ERYTHROCYTE [DISTWIDTH] IN BLOOD BY AUTOMATED COUNT: 13.6 % (ref 11.9–14.6)
GLUCOSE SERPL-MCNC: 92 MG/DL (ref 65–100)
HCT VFR BLD AUTO: 39.6 % (ref 41.1–50.3)
HGB BLD-MCNC: 13 G/DL (ref 13.6–17.2)
IMM GRANULOCYTES # BLD AUTO: 0 K/UL (ref 0–0.5)
IMM GRANULOCYTES NFR BLD AUTO: 0 % (ref 0–5)
LYMPHOCYTES # BLD: 1.4 K/UL (ref 0.5–4.6)
LYMPHOCYTES NFR BLD: 26 % (ref 13–44)
MCH RBC QN AUTO: 31.4 PG (ref 26.1–32.9)
MCHC RBC AUTO-ENTMCNC: 32.8 G/DL (ref 31.4–35)
MCV RBC AUTO: 95.7 FL (ref 82–102)
MONOCYTES # BLD: 0.7 K/UL (ref 0.1–1.3)
MONOCYTES NFR BLD: 12 % (ref 4–12)
NEUTS SEG # BLD: 3.3 K/UL (ref 1.7–8.2)
NEUTS SEG NFR BLD: 60 % (ref 43–78)
NRBC # BLD: 0 K/UL (ref 0–0.2)
PLATELET # BLD AUTO: 260 K/UL (ref 150–450)
PMV BLD AUTO: 9 FL (ref 9.4–12.3)
POTASSIUM SERPL-SCNC: 4.1 MMOL/L (ref 3.5–5.1)
RBC # BLD AUTO: 4.14 M/UL (ref 4.23–5.6)
SODIUM SERPL-SCNC: 143 MMOL/L (ref 133–143)
WBC # BLD AUTO: 5.5 K/UL (ref 4.3–11.1)

## 2023-09-22 PROCEDURE — 80048 BASIC METABOLIC PNL TOTAL CA: CPT

## 2023-09-22 PROCEDURE — 2580000003 HC RX 258: Performed by: STUDENT IN AN ORGANIZED HEALTH CARE EDUCATION/TRAINING PROGRAM

## 2023-09-22 PROCEDURE — G0378 HOSPITAL OBSERVATION PER HR: HCPCS

## 2023-09-22 PROCEDURE — 93010 ELECTROCARDIOGRAM REPORT: CPT | Performed by: INTERNAL MEDICINE

## 2023-09-22 PROCEDURE — APPNB45 APP NON BILLABLE 31-45 MINUTES: Performed by: NURSE PRACTITIONER

## 2023-09-22 PROCEDURE — 36415 COLL VENOUS BLD VENIPUNCTURE: CPT

## 2023-09-22 PROCEDURE — 85025 COMPLETE CBC W/AUTO DIFF WBC: CPT

## 2023-09-22 PROCEDURE — 6370000000 HC RX 637 (ALT 250 FOR IP): Performed by: STUDENT IN AN ORGANIZED HEALTH CARE EDUCATION/TRAINING PROGRAM

## 2023-09-22 PROCEDURE — 93298 REM INTERROG DEV EVAL SCRMS: CPT | Performed by: INTERNAL MEDICINE

## 2023-09-22 PROCEDURE — G2066 INTER DEVC REMOTE 30D: HCPCS | Performed by: INTERNAL MEDICINE

## 2023-09-22 PROCEDURE — 95816 EEG AWAKE AND DROWSY: CPT | Performed by: STUDENT IN AN ORGANIZED HEALTH CARE EDUCATION/TRAINING PROGRAM

## 2023-09-22 RX ORDER — AMOXICILLIN AND CLAVULANATE POTASSIUM 875; 125 MG/1; MG/1
1 TABLET, FILM COATED ORAL EVERY 12 HOURS SCHEDULED
Qty: 20 TABLET | Refills: 0 | Status: SHIPPED | OUTPATIENT
Start: 2023-09-22 | End: 2023-10-02

## 2023-09-22 RX ORDER — AMOXICILLIN AND CLAVULANATE POTASSIUM 875; 125 MG/1; MG/1
1 TABLET, FILM COATED ORAL EVERY 12 HOURS SCHEDULED
Status: DISCONTINUED | OUTPATIENT
Start: 2023-09-22 | End: 2023-09-22 | Stop reason: HOSPADM

## 2023-09-22 RX ADMIN — AMLODIPINE BESYLATE 10 MG: 10 TABLET ORAL at 09:10

## 2023-09-22 RX ADMIN — FINASTERIDE 5 MG: 5 TABLET, FILM COATED ORAL at 09:10

## 2023-09-22 RX ADMIN — TAMSULOSIN HYDROCHLORIDE 0.4 MG: 0.4 CAPSULE ORAL at 09:09

## 2023-09-22 RX ADMIN — SODIUM CHLORIDE, PRESERVATIVE FREE 10 ML: 5 INJECTION INTRAVENOUS at 09:10

## 2023-09-22 RX ADMIN — VENLAFAXINE HYDROCHLORIDE 150 MG: 150 CAPSULE, EXTENDED RELEASE ORAL at 09:09

## 2023-09-22 RX ADMIN — ATORVASTATIN CALCIUM 40 MG: 40 TABLET, FILM COATED ORAL at 00:59

## 2023-09-22 RX ADMIN — DONEPEZIL HYDROCHLORIDE 5 MG: 5 TABLET, FILM COATED ORAL at 09:10

## 2023-09-22 RX ADMIN — ASPIRIN 81 MG: 81 TABLET ORAL at 09:09

## 2023-09-22 RX ADMIN — OXCARBAZEPINE 300 MG: 300 TABLET, FILM COATED ORAL at 09:10

## 2023-09-22 ASSESSMENT — PAIN SCALES - GENERAL
PAINLEVEL_OUTOF10: 0
PAINLEVEL_OUTOF10: 0

## 2023-09-22 NOTE — PROGRESS NOTES
If you need to see a   -  just ask the nurse to call us. We look forward to serving your family!!         Waleska Pool

## 2023-09-22 NOTE — PROCEDURES
Routine EEG Report    Ordering Physician:   Staff Physician:    Reason for EEG:    Technical Summary: This EEG was performed with a 32-channel digital EEG machine with electrodes placed according to the international 10-20 system of placement. Background:   During the maximal awake state a posterior dominant rhythm of 9 Hz was seen. It was well regulated and well sustained, symmetric, and reactive to eye opening. Anterior background consisted of medium amplitude activity in the alpha range. Photic Stimulation: Photic stimulation was performed at various flash frequencies and no abnormalites were seen. Impression: This is a normal waking EEG without evidence of cortical hyperexcitability or other lateralizing features.      Allison Sales, DO  Neurology

## 2023-09-22 NOTE — ED NOTES
TRANSFER - OUT REPORT:    Verbal report given to AIYANA Cox on Olga Hubbard  being transferred to 18 for routine progression of patient care       Report consisted of patient's Situation, Background, Assessment and   Recommendations(SBAR). Information from the following report(s) ED SBAR was reviewed with the receiving nurse. Sharlene Fall Assessment:    Presents to emergency department  because of falls (Syncope, seizure, or loss of consciousness): No  Age > 70: Yes  Altered Mental Status, Intoxication with alcohol or substance confusion (Disorientation, impaired judgment, poor safety awaremess, or inability to follow instructions): No  Impaired Mobility: Ambulates or transfers with assistive devices or assistance; Unable to ambulate or transer.: No  Nursing Judgement: No          Lines:   Peripheral IV 09/21/23 Left Antecubital (Active)        Opportunity for questions and clarification was provided.       Patient transported with:  Registered Nurse             Alejandra Gonsales RN  09/21/23 2050

## 2023-09-22 NOTE — DISCHARGE SUMMARY
[id number]  [phone]                       Time spent in patient discharge and coordination 40 minutes. Follow up labs/diagnostics:  none    Plan was discussed with patient. All questions answered. Patient was stable at time of discharge. Instructions given to call a physician or return if any concerns. Current Discharge Medication List        START taking these medications    Details   amoxicillin-clavulanate (AUGMENTIN) 875-125 MG per tablet Take 1 tablet by mouth every 12 hours for 20 doses  Qty: 20 tablet, Refills: 0           CONTINUE these medications which have CHANGED    Details   aspirin EC 81 MG EC tablet Take 1 tablet by mouth daily Daily in morning per patient  Qty: 30 tablet, Refills: 1    Associated Diagnoses: Primary osteoarthritis of right knee           CONTINUE these medications which have NOT CHANGED    Details   amLODIPine (NORVASC) 10 MG tablet Take 1 tablet by mouth daily      losartan (COZAAR) 50 MG tablet Take 1 tablet by mouth nightly      finasteride (PROSCAR) 5 MG tablet Take 1 tablet by mouth daily      oxyCODONE (ROXICODONE) 5 MG immediate release tablet Take 5-10 mg by mouth every 4 hours as needed for Pain. acetaminophen (TYLENOL) 500 MG tablet Take 2 tablets by mouth every 6 hours as needed (breakthrough pain)      polyethylene glycol (GLYCOLAX) 17 GM/SCOOP powder Take 17 g by mouth daily      methocarbamol (ROBAXIN-750) 750 MG tablet Take 1 tablet by mouth every 6 hours as needed for spasms.   Qty: 40 tablet, Refills: 0    Associated Diagnoses: Primary osteoarthritis of right knee      ondansetron (ZOFRAN) 4 MG tablet Take 1 tablet by mouth every 6 hours as needed for Nausea or Vomiting  Qty: 30 tablet, Refills: 0    Associated Diagnoses: Primary osteoarthritis of right knee      celecoxib (CELEBREX) 200 MG capsule Take 1 capsule by mouth daily  Qty: 30 capsule, Refills: 0    Associated Diagnoses: Primary osteoarthritis of right knee      tamsulosin (FLOMAX) 0.4 MG Monocytes Absolute 0.3 0.1 - 1.3 K/UL    Eosinophils Absolute 0.0 0.0 - 0.8 K/UL    Basophils Absolute 0.0 0.0 - 0.2 K/UL    Absolute Immature Granulocyte 0.0 0.0 - 0.5 K/UL   Comprehensive Metabolic Panel    Collection Time: 09/21/23  5:22 PM   Result Value Ref Range    Sodium 137 133 - 143 mmol/L    Potassium 4.9 3.5 - 5.1 mmol/L    Chloride 104 101 - 110 mmol/L    CO2 28 21 - 32 mmol/L    Anion Gap 5 2 - 11 mmol/L    Glucose 129 (H) 65 - 100 mg/dL    BUN 15 8 - 23 MG/DL    Creatinine 1.00 0.8 - 1.5 MG/DL    Est, Glom Filt Rate >60 >60 ml/min/1.73m2    Calcium 9.0 8.3 - 10.4 MG/DL    Total Bilirubin 0.5 0.2 - 1.1 MG/DL    ALT 25 12 - 65 U/L    AST 16 15 - 37 U/L    Alk Phosphatase 128 50 - 136 U/L    Total Protein 7.6 6.3 - 8.2 g/dL    Albumin 4.0 3.2 - 4.6 g/dL    Globulin 3.6 2.8 - 4.5 g/dL    Albumin/Globulin Ratio 1.1 0.4 - 1.6     Protime-INR    Collection Time: 09/21/23  5:22 PM   Result Value Ref Range    Protime 13.6 12.6 - 14.3 sec    INR 1.0     Lipid Panel    Collection Time: 09/21/23  5:22 PM   Result Value Ref Range    Cholesterol, Total 171 <200 MG/DL    Triglycerides 23 (L) 35 - 150 MG/DL    HDL 97 (H) 40 - 60 MG/DL    LDL Calculated 69.4 <100 MG/DL    VLDL Cholesterol Calculated 4.6 (L) 6.0 - 23.0 MG/DL    Chol/HDL Ratio 1.8     Hemoglobin A1C    Collection Time: 09/21/23  5:22 PM   Result Value Ref Range    Hemoglobin A1C 5.7 (H) 4.8 - 5.6 %    eAG 117 mg/dL   Basic Metabolic Panel w/ Reflex to MG    Collection Time: 09/22/23  2:59 AM   Result Value Ref Range    Sodium 143 133 - 143 mmol/L    Potassium 4.1 3.5 - 5.1 mmol/L    Chloride 111 (H) 101 - 110 mmol/L    CO2 31 21 - 32 mmol/L    Anion Gap 1 (L) 2 - 11 mmol/L    Glucose 92 65 - 100 mg/dL    BUN 14 8 - 23 MG/DL    Creatinine 0.80 0.8 - 1.5 MG/DL    Est, Glom Filt Rate >60 >60 ml/min/1.73m2    Calcium 8.5 8.3 - 10.4 MG/DL   CBC with Auto Differential    Collection Time: 09/22/23  2:59 AM   Result Value Ref Range    WBC 5.5 4.3 - 11.1 K/uL

## 2023-09-22 NOTE — PROGRESS NOTES
TRANSFER - IN REPORT:    Verbal report received from [facility] on [address]  being received from ED for routine progression of patient care      Report consisted of patient's Situation, Background, Assessment and   Recommendations(SBAR). Information from the following report(s) Nurse Handoff Report, Adult Overview, MAR, Recent Results, and Cardiac Rhythm SR  was reviewed with the receiving nurse. Opportunity for questions and clarification was provided. Assessment completed upon patient's arrival to unit and care assumed.

## 2023-09-22 NOTE — CARE COORDINATION
Patient admitted with AMS. Neuro consulted and ordered EEG. EEG normal.MRI revealed incidental finding (R) maxillary sinusitis. DCP: Home with family assistance.

## 2023-09-22 NOTE — H&P
Result Value Ref Range    WBC 6.7 4.3 - 11.1 K/uL    RBC 4.43 4.23 - 5.6 M/uL    Hemoglobin 14.2 13.6 - 17.2 g/dL    Hematocrit 42.7 41.1 - 50.3 %    MCV 96.4 82 - 102 FL    MCH 32.1 26.1 - 32.9 PG    MCHC 33.3 31.4 - 35.0 g/dL    RDW 13.7 11.9 - 14.6 %    Platelets 128 702 - 072 K/uL    MPV 8.6 (L) 9.4 - 12.3 FL    nRBC 0.00 0.0 - 0.2 K/uL    Differential Type AUTOMATED      Neutrophils % 83 (H) 43 - 78 %    Lymphocytes % 11 (L) 13 - 44 %    Monocytes % 5 4.0 - 12.0 %    Eosinophils % 0 (L) 0.5 - 7.8 %    Basophils % 1 0.0 - 2.0 %    Immature Granulocytes 0 0.0 - 5.0 %    Neutrophils Absolute 5.5 1.7 - 8.2 K/UL    Lymphocytes Absolute 0.7 0.5 - 4.6 K/UL    Monocytes Absolute 0.3 0.1 - 1.3 K/UL    Eosinophils Absolute 0.0 0.0 - 0.8 K/UL    Basophils Absolute 0.0 0.0 - 0.2 K/UL    Absolute Immature Granulocyte 0.0 0.0 - 0.5 K/UL   Comprehensive Metabolic Panel    Collection Time: 09/21/23  5:22 PM   Result Value Ref Range    Sodium 137 133 - 143 mmol/L    Potassium 4.9 3.5 - 5.1 mmol/L    Chloride 104 101 - 110 mmol/L    CO2 28 21 - 32 mmol/L    Anion Gap 5 2 - 11 mmol/L    Glucose 129 (H) 65 - 100 mg/dL    BUN 15 8 - 23 MG/DL    Creatinine 1.00 0.8 - 1.5 MG/DL    Est, Glom Filt Rate >60 >60 ml/min/1.73m2    Calcium 9.0 8.3 - 10.4 MG/DL    Total Bilirubin 0.5 0.2 - 1.1 MG/DL    ALT 25 12 - 65 U/L    AST 16 15 - 37 U/L    Alk Phosphatase 128 50 - 136 U/L    Total Protein 7.6 6.3 - 8.2 g/dL    Albumin 4.0 3.2 - 4.6 g/dL    Globulin 3.6 2.8 - 4.5 g/dL    Albumin/Globulin Ratio 1.1 0.4 - 1.6     Protime-INR    Collection Time: 09/21/23  5:22 PM   Result Value Ref Range    Protime 13.6 12.6 - 14.3 sec    INR 1.0     Lipid Panel    Collection Time: 09/21/23  5:22 PM   Result Value Ref Range    Cholesterol, Total 171 <200 MG/DL    Triglycerides 23 (L) 35 - 150 MG/DL    HDL 97 (H) 40 - 60 MG/DL    LDL Calculated 69.4 <100 MG/DL    VLDL Cholesterol Calculated 4.6 (L) 6.0 - 23.0 MG/DL    Chol/HDL Ratio 1.8     Hemoglobin A1C

## 2023-09-25 ENCOUNTER — TELEPHONE (OUTPATIENT)
Dept: SLEEP MEDICINE | Age: 73
End: 2023-09-25

## 2023-09-25 LAB — OXCARBAZEPINE SERPL-MCNC: 7 UG/ML (ref 10–35)

## 2023-10-12 ENCOUNTER — TELEPHONE (OUTPATIENT)
Age: 73
End: 2023-10-12

## 2023-10-12 NOTE — TELEPHONE ENCOUNTER
Pt.wife is calling reporting that HR has been 45-49 he seems more tired. HR been low a few months now. Just not doing well since his episode of Garjohnson Michael just has not been as well. Current Outpatient Medications on File Prior to Visit   Medication Sig Dispense Refill    aspirin EC 81 MG EC tablet Take 1 tablet by mouth daily Daily in morning per patient 30 tablet 1    amLODIPine (NORVASC) 10 MG tablet Take 1 tablet by mouth daily      losartan (COZAAR) 50 MG tablet Take 1 tablet by mouth nightly      finasteride (PROSCAR) 5 MG tablet Take 1 tablet by mouth daily      tamsulosin (FLOMAX) 0.4 MG capsule Take 1 capsule by mouth in the morning and at bedtime Indications: evening dose at 6 pm      venlafaxine (EFFEXOR XR) 150 MG extended release capsule Take 1 capsule by mouth daily      donepezil (ARICEPT) 10 MG tablet Take 0.5 tablets by mouth every morning      OXcarbazepine (TRILEPTAL) 300 MG tablet Take 1 tablet by mouth 2 times daily       No current facility-administered medications on file prior to visit.

## 2023-10-12 NOTE — TELEPHONE ENCOUNTER
Pt spouse called. Pt was in ER two weeks ago and still experiencing low blood rate and would like a call back and to see if the 11/20 appt should be moved up and what to do.

## 2023-10-12 NOTE — TELEPHONE ENCOUNTER
Lop reviewed w/ presenting rhythm early this am at 60bpm. Loop set for garrick < 40bpm.     NO episodes recorded the last few weeks. Average rates are in the 70's.  PVC burden at 5%,

## 2023-10-13 ENCOUNTER — OFFICE VISIT (OUTPATIENT)
Age: 73
End: 2023-10-13
Payer: MEDICARE

## 2023-10-13 VITALS
HEIGHT: 66 IN | BODY MASS INDEX: 29.89 KG/M2 | SYSTOLIC BLOOD PRESSURE: 138 MMHG | WEIGHT: 186 LBS | HEART RATE: 80 BPM | DIASTOLIC BLOOD PRESSURE: 78 MMHG

## 2023-10-13 DIAGNOSIS — I10 PRIMARY HYPERTENSION: Chronic | ICD-10-CM

## 2023-10-13 DIAGNOSIS — Z95.2 HISTORY OF TRANSCATHETER AORTIC VALVE REPLACEMENT (TAVR): Primary | ICD-10-CM

## 2023-10-13 PROCEDURE — 3075F SYST BP GE 130 - 139MM HG: CPT | Performed by: INTERNAL MEDICINE

## 2023-10-13 PROCEDURE — 99214 OFFICE O/P EST MOD 30 MIN: CPT | Performed by: INTERNAL MEDICINE

## 2023-10-13 PROCEDURE — 3078F DIAST BP <80 MM HG: CPT | Performed by: INTERNAL MEDICINE

## 2023-10-13 PROCEDURE — 1123F ACP DISCUSS/DSCN MKR DOCD: CPT | Performed by: INTERNAL MEDICINE

## 2023-10-13 ASSESSMENT — ENCOUNTER SYMPTOMS
SHORTNESS OF BREATH: 1
BACK PAIN: 0
COUGH: 0
ABDOMINAL PAIN: 0

## 2023-10-13 NOTE — PROGRESS NOTES
8.5 09/22/2023 02:59 AM                  ASSESSMENT:    Loi Malhotra was seen today for hypertension. Diagnoses and all orders for this visit:    S/P TAVR -patient status post TAVR 09/2022 with 29 mm Graff VIVI ultra valve for severe bicuspid aortic stenosis. Normal echo 08/2023. Fatigue - ILR with no arrhythmias. No cardiac etiology. Patient was told he had NSVT but has baseline LBBB. Hypertension, unspecified type -well-controlled on amlodipine and losartan. Problem List Items Addressed This Visit          Circulatory    Hypertension (Chronic)       Other    History of transcatheter aortic valve replacement (TAVR) - Primary   There are no discontinued medications. Patient has been instructed and agrees to call our office with any issues or other concerns related to their cardiac condition(s) and/or complaint(s). Return in about 6 months (around 4/13/2024).        Barbara Vega MD  10/13/2023

## 2023-11-08 PROCEDURE — G2066 INTER DEVC REMOTE 30D: HCPCS | Performed by: INTERNAL MEDICINE

## 2023-11-08 PROCEDURE — 93298 REM INTERROG DEV EVAL SCRMS: CPT | Performed by: INTERNAL MEDICINE

## 2024-01-10 PROCEDURE — 93298 REM INTERROG DEV EVAL SCRMS: CPT | Performed by: INTERNAL MEDICINE

## 2024-01-28 ENCOUNTER — HOSPITAL ENCOUNTER (EMERGENCY)
Age: 74
Discharge: HOME | End: 2024-01-28
Payer: MEDICARE

## 2024-01-28 VITALS
WEIGHT: 170 LBS | HEART RATE: 69 BPM | OXYGEN SATURATION: 94 % | BODY MASS INDEX: 27.32 KG/M2 | DIASTOLIC BLOOD PRESSURE: 81 MMHG | HEIGHT: 66 IN | TEMPERATURE: 97.9 F | SYSTOLIC BLOOD PRESSURE: 163 MMHG | RESPIRATION RATE: 16 BRPM

## 2024-01-28 DIAGNOSIS — H92.02 EAR PAIN, LEFT: Primary | ICD-10-CM

## 2024-01-28 PROCEDURE — 99282 EMERGENCY DEPT VISIT SF MDM: CPT

## 2024-01-28 ASSESSMENT — PAIN - FUNCTIONAL ASSESSMENT: PAIN_FUNCTIONAL_ASSESSMENT: 0-10

## 2024-01-28 ASSESSMENT — PAIN SCALES - GENERAL: PAINLEVEL_OUTOF10: 0

## 2024-01-28 NOTE — ED TRIAGE NOTES
Patient states he was cleaning his left ear out with a melody pin and it started bleeding states he did not hit anything nor was he digging and he always cleans his ears out this way and he could not get it to stop bleeding denies any blood thinners, but does endorse 81mg ASA daily, denies pain in the ear.

## 2024-01-28 NOTE — ED PROVIDER NOTES
Emergency Department Provider Note       PCP: Carmel Chen MD   Age: 73 y.o.   Sex: male     DISPOSITION Decision To Discharge 01/28/2024 03:55:27 AM       ICD-10-CM    1. Ear pain, left  H92.02           Medical Decision Making     Complexity of Problems Addressed:  1 or more acute illnesses that pose a threat to life or bodily function.     Data Reviewed and Analyzed:  I independently ordered and reviewed each unique test.  I reviewed external records: ED visit note from an outside group.  I reviewed external records: provider visit note from PCP.           Discussion of management or test interpretation.  See ED course below.       Risk of Complications and/or Morbidity of Patient Management:  Patient was discharged risks and benefits of hospitalization were considered.  Shared medical decision making was utilized in creating the patients health plan today.    ED Course as of 01/28/24 0359   Sun Jan 28, 2024 0358 73-year-old male presents for bleeding to left ear canal.  Small laceration seen in ear canal with blood in canal, no active bleeding noted.  Discussed with patient placing cotton swab in ear and not disrupting wound  During evaluation patient was actively sticking his finger into the ear canal.  Encouraged to allow wound to heal. [CJ]      ED Course User Index  [CJ] Estela Earl, APRN - CNP       History       73-year-old male presents with complaint of bleeding from left ear.  He reports that this occurred after using a Manyn pin to clean his ear.  He denies injury or trauma to his head or ear.  He takes 81 mg aspirin daily.    The history is provided by the patient and the spouse.        Review of Systems   HENT:  Positive for ear pain.    All other systems reviewed and are negative.      Physical Exam     Vitals signs and nursing note reviewed:  Vitals:    01/28/24 0323 01/28/24 0324   BP:  (!) 163/81   Pulse:  69   Resp:  16   Temp:  97.9 °F (36.6 °C)   SpO2:  94%   Weight: 77.1 kg

## 2024-01-28 NOTE — ED NOTES
I have reviewed discharge instructions with the patient.  The patient verbalized understanding.    Patient left ED via Discharge Method: ambulatory to Home.  Opportunity for questions and clarification provided.       Patient given 0 scripts.         To continue your aftercare when you leave the hospital, you may receive an automated call from our care team to check in on how you are doing.  This is a free service and part of our promise to provide the best care and service to meet your aftercare needs.” If you have questions, or wish to unsubscribe from this service please call [phone].  Thank you for Choosing our [facility] Emergency Department.        Hardik Sweet, RN  01/28/24 9035

## 2024-01-28 NOTE — DISCHARGE INSTRUCTIONS
Leave cotton swab and ear.  Attempt not to disrupt the wound.  This includes not putting fingers or other objects in the ear canal.  Follow-up with ENT as needed.

## 2024-02-05 ENCOUNTER — APPOINTMENT (OUTPATIENT)
Dept: MRI IMAGING | Age: 74
End: 2024-02-05
Payer: MEDICARE

## 2024-02-05 ENCOUNTER — HOSPITAL ENCOUNTER (EMERGENCY)
Age: 74
Discharge: HOME | End: 2024-02-05
Payer: MEDICARE

## 2024-02-05 VITALS
OXYGEN SATURATION: 96 % | HEIGHT: 67 IN | SYSTOLIC BLOOD PRESSURE: 138 MMHG | RESPIRATION RATE: 18 BRPM | BODY MASS INDEX: 27.47 KG/M2 | TEMPERATURE: 98 F | DIASTOLIC BLOOD PRESSURE: 67 MMHG | HEART RATE: 62 BPM | WEIGHT: 175 LBS

## 2024-02-05 DIAGNOSIS — R51.9 INTERMITTENT HEADACHE: ICD-10-CM

## 2024-02-05 DIAGNOSIS — G89.29 CHRONIC NECK PAIN: ICD-10-CM

## 2024-02-05 DIAGNOSIS — M54.2 CHRONIC NECK PAIN: ICD-10-CM

## 2024-02-05 DIAGNOSIS — S12.100K CLOSED ODONTOID FRACTURE WITH NONUNION, SUBSEQUENT ENCOUNTER: Primary | ICD-10-CM

## 2024-02-05 LAB
ALBUMIN SERPL-MCNC: 3.7 G/DL (ref 3.2–4.6)
ALBUMIN/GLOB SERPL: 1.2 (ref 0.4–1.6)
ALP SERPL-CCNC: 130 U/L (ref 50–136)
ALT SERPL-CCNC: 23 U/L (ref 12–65)
ANION GAP SERPL CALC-SCNC: 4 MMOL/L (ref 2–11)
AST SERPL-CCNC: 16 U/L (ref 15–37)
BASOPHILS # BLD: 0.1 K/UL (ref 0–0.2)
BASOPHILS NFR BLD: 1 % (ref 0–2)
BILIRUB SERPL-MCNC: 0.2 MG/DL (ref 0.2–1.1)
BUN SERPL-MCNC: 13 MG/DL (ref 8–23)
CALCIUM SERPL-MCNC: 9.1 MG/DL (ref 8.3–10.4)
CHLORIDE SERPL-SCNC: 105 MMOL/L (ref 103–113)
CO2 SERPL-SCNC: 30 MMOL/L (ref 21–32)
CREAT SERPL-MCNC: 0.8 MG/DL (ref 0.8–1.5)
DIFFERENTIAL METHOD BLD: ABNORMAL
EOSINOPHIL # BLD: 0.1 K/UL (ref 0–0.8)
EOSINOPHIL NFR BLD: 2 % (ref 0.5–7.8)
ERYTHROCYTE [DISTWIDTH] IN BLOOD BY AUTOMATED COUNT: 13.2 % (ref 11.9–14.6)
ERYTHROCYTE [SEDIMENTATION RATE] IN BLOOD: <1 MM/HR
GLOBULIN SER CALC-MCNC: 3.1 G/DL (ref 2.8–4.5)
GLUCOSE SERPL-MCNC: 95 MG/DL (ref 65–100)
HCT VFR BLD AUTO: 41 % (ref 41.1–50.3)
HGB BLD-MCNC: 13.7 G/DL (ref 13.6–17.2)
IMM GRANULOCYTES # BLD AUTO: 0 K/UL (ref 0–0.5)
IMM GRANULOCYTES NFR BLD AUTO: 0 % (ref 0–5)
LYMPHOCYTES # BLD: 1.7 K/UL (ref 0.5–4.6)
LYMPHOCYTES NFR BLD: 27 % (ref 13–44)
MCH RBC QN AUTO: 32.8 PG (ref 26.1–32.9)
MCHC RBC AUTO-ENTMCNC: 33.4 G/DL (ref 31.4–35)
MCV RBC AUTO: 98.1 FL (ref 82–102)
MONOCYTES # BLD: 0.7 K/UL (ref 0.1–1.3)
MONOCYTES NFR BLD: 11 % (ref 4–12)
NEUTS SEG # BLD: 3.6 K/UL (ref 1.7–8.2)
NEUTS SEG NFR BLD: 59 % (ref 43–78)
NRBC # BLD: 0 K/UL (ref 0–0.2)
PLATELET # BLD AUTO: 269 K/UL (ref 150–450)
PMV BLD AUTO: 9.1 FL (ref 9.4–12.3)
POTASSIUM SERPL-SCNC: 4 MMOL/L (ref 3.5–5.1)
PROT SERPL-MCNC: 6.8 G/DL (ref 6.3–8.2)
RBC # BLD AUTO: 4.18 M/UL (ref 4.23–5.6)
SODIUM SERPL-SCNC: 139 MMOL/L (ref 136–146)
WBC # BLD AUTO: 6.2 K/UL (ref 4.3–11.1)

## 2024-02-05 PROCEDURE — 85652 RBC SED RATE AUTOMATED: CPT

## 2024-02-05 PROCEDURE — 2580000003 HC RX 258: Performed by: NURSE PRACTITIONER

## 2024-02-05 PROCEDURE — 99285 EMERGENCY DEPT VISIT HI MDM: CPT

## 2024-02-05 PROCEDURE — A9579 GAD-BASE MR CONTRAST NOS,1ML: HCPCS | Performed by: NURSE PRACTITIONER

## 2024-02-05 PROCEDURE — 80053 COMPREHEN METABOLIC PANEL: CPT

## 2024-02-05 PROCEDURE — 6360000004 HC RX CONTRAST MEDICATION: Performed by: NURSE PRACTITIONER

## 2024-02-05 PROCEDURE — 70553 MRI BRAIN STEM W/O & W/DYE: CPT

## 2024-02-05 PROCEDURE — 72141 MRI NECK SPINE W/O DYE: CPT

## 2024-02-05 PROCEDURE — 85025 COMPLETE CBC W/AUTO DIFF WBC: CPT

## 2024-02-05 RX ORDER — SODIUM CHLORIDE 0.9 % (FLUSH) 0.9 %
10 SYRINGE (ML) INJECTION AS NEEDED
Status: DISCONTINUED | OUTPATIENT
Start: 2024-02-05 | End: 2024-02-05 | Stop reason: HOSPADM

## 2024-02-05 RX ADMIN — SODIUM CHLORIDE, PRESERVATIVE FREE 10 ML: 5 INJECTION INTRAVENOUS at 17:42

## 2024-02-05 RX ADMIN — GADOTERIDOL 16 ML: 279.3 INJECTION, SOLUTION INTRAVENOUS at 17:42

## 2024-02-05 ASSESSMENT — LIFESTYLE VARIABLES
HOW MANY STANDARD DRINKS CONTAINING ALCOHOL DO YOU HAVE ON A TYPICAL DAY: PATIENT DOES NOT DRINK
HOW OFTEN DO YOU HAVE A DRINK CONTAINING ALCOHOL: NEVER

## 2024-02-05 NOTE — ED PROVIDER NOTES
Emergency Department Provider Note       PCP: Carmel Chen MD   Age: 73 y.o.   Sex: male     DISPOSITION     dc    ICD-10-CM    1. Closed odontoid fracture with nonunion, subsequent encounter  S12.100K [facility] (Spine Surgery)      2. Intermittent headache  R51.9       3. Chronic neck pain  M54.2     G89.29           Medical Decision Making     Complexity of Problems Addressed:  1 or more chronic illnesses that pose a threat to life or bodily function.    Data Reviewed and Analyzed:  I independently ordered and reviewed each unique test.  I reviewed external records: provider visit note from outside specialist.   The patients assessment required an independent historian: Wife.  The reason they were needed is important historical information not provided by the patient.    I interpreted the MRI head results with dens fracture noted by radiologist.    Discussion of management or test interpretation.  Patient denies any acute complaint, states that his headache at this time is largely resolved \"I am not sure its even there.\"  Denies any current neck pain.  Has had no falls recent illness, numbness tingling or weakness, unplanned weight loss, night sweats, incontinence, difficulty with bladder or bowels or other complaint.  States he is in the ED today only to get MRI of his head and neck, stating [facility]  States that he was ordered outpatient MRI of his brain and cervical spine, was told to be 8 weeks before he could have the imaging performed and expressed displeasure regarding this timeline.  They then advised to come to the ED.  Patient has a reassuring exam and there are no neurofocal deficits.  I have low clinical suspicion of stroke, intracranial bleeding, dissection, giant cell arteritis, or other acute emergent process.  Lengthy discussion with the patient and ultimately agreed that we will obtain as requested imaging, as well as labs

## 2024-02-05 NOTE — ED TRIAGE NOTES
Pt arrives via pov ambualtory c/o intermittent headaches since neck injury few years ago. Pt reports headaches are not new. Pt denies dizziness or vision changes.  No unilateral numbness/tingling. [facility].

## 2024-02-06 ENCOUNTER — TELEPHONE (OUTPATIENT)
Dept: ORTHOPEDIC SURGERY | Age: 74
End: 2024-02-06

## 2024-02-06 NOTE — ED NOTES
I have reviewed discharge instructions with the patient.  The patient verbalized understanding.    Patient left ED via Discharge Method: ambulatory to Home with family    Opportunity for questions and clarification provided.       Patient given 0 scripts.         To continue your aftercare when you leave the hospital, you may receive an automated call from our care team to check in on how you are doing.  This is a free service and part of our promise to provide the best care and service to meet your aftercare needs.” If you have questions, or wish to unsubscribe from this service please call [phone].  Thank you for Choosing our [facility] Emergency Department.       Toni Griffiths, AIYANA  02/05/24 6040

## 2024-02-13 ENCOUNTER — OFFICE VISIT (OUTPATIENT)
Dept: ORTHOPEDIC SURGERY | Age: 74
End: 2024-02-13
Payer: MEDICARE

## 2024-02-13 DIAGNOSIS — M50.30 DEGENERATIVE DISC DISEASE, CERVICAL: Primary | ICD-10-CM

## 2024-02-13 DIAGNOSIS — M54.2 NECK PAIN: ICD-10-CM

## 2024-02-13 PROCEDURE — 1123F ACP DISCUSS/DSCN MKR DOCD: CPT | Performed by: ORTHOPAEDIC SURGERY

## 2024-02-13 PROCEDURE — G8484 FLU IMMUNIZE NO ADMIN: HCPCS | Performed by: ORTHOPAEDIC SURGERY

## 2024-02-13 PROCEDURE — 99213 OFFICE O/P EST LOW 20 MIN: CPT | Performed by: ORTHOPAEDIC SURGERY

## 2024-02-13 PROCEDURE — G8427 DOCREV CUR MEDS BY ELIG CLIN: HCPCS | Performed by: ORTHOPAEDIC SURGERY

## 2024-02-13 PROCEDURE — 3017F COLORECTAL CA SCREEN DOC REV: CPT | Performed by: ORTHOPAEDIC SURGERY

## 2024-02-13 PROCEDURE — 1036F TOBACCO NON-USER: CPT | Performed by: ORTHOPAEDIC SURGERY

## 2024-02-13 PROCEDURE — G8417 CALC BMI ABV UP PARAM F/U: HCPCS | Performed by: ORTHOPAEDIC SURGERY

## 2024-02-13 RX ORDER — CHOLECALCIFEROL (VITAMIN D3) 1250 MCG
CAPSULE ORAL
COMMUNITY

## 2024-02-13 RX ORDER — FOLIC ACID 1 MG/1
1 TABLET ORAL DAILY
COMMUNITY
Start: 2012-11-08

## 2024-02-13 NOTE — PROGRESS NOTES
Name: Nicholas Mcdonald  YOB: 1950  Gender: male  MRN: [id number]  Age: 73 y.o.    Chief Complaint: Neck pain  History of present illness:    This is a very pleasant 73 y.o. male who presents with with a history of neck pain.  He states that he has a old neck fracture that is a least 2 years old.  He complains of frontal headaches as well.  Denies any shoulder or arm pain.  He localizes the neck pain to his paraspinal muscles bilaterally.  He does not smoke.  He does not have diabetes.  He does not take any blood thinners.  He states his pain is 3/10 in severity and constant.  Sometimes he does not even notice he has any neck pain.      Medications:     Prior to Visit Medications    Medication Sig Taking? Authorizing Provider   folic acid (FOLVITE) 1 MG tablet Take 1 tablet by mouth daily Yes Jessi So MD   Cholecalciferol (VITAMIN D3) 1.25 MG (75114 UT) CAPS Take by mouth Yes Jessi So MD   aspirin EC 81 MG EC tablet Take 1 tablet by mouth daily Daily in morning per patient Yes Fredy Zurita,    amLODIPine (NORVASC) 10 MG tablet Take 1 tablet by mouth daily Yes Jessi So MD   losartan (COZAAR) 50 MG tablet Take 1 tablet by mouth nightly Yes Jessi So MD   finasteride (PROSCAR) 5 MG tablet Take 1 tablet by mouth daily Yes Jessi So MD   tamsulosin (FLOMAX) 0.4 MG capsule Take 1 capsule by mouth in the morning and at bedtime Indications: evening dose at 6 pm Yes Jessi So MD   venlafaxine (EFFEXOR XR) 150 MG extended release capsule Take 1 capsule by mouth daily Yes Jessi So MD   donepezil (ARICEPT) 10 MG tablet Take 0.5 tablets by mouth every morning Yes Jessi So MD   OXcarbazepine (TRILEPTAL) 300 MG tablet Take 1 tablet by mouth 2 times daily Yes Jessi So MD       Allergies:     Allergies   Allergen Reactions    Aspartame Other (See Comments)     seizures        Physical Exam:

## 2024-02-14 ENCOUNTER — OFFICE VISIT (OUTPATIENT)
Dept: ORTHOPEDIC SURGERY | Age: 74
End: 2024-02-14
Payer: MEDICARE

## 2024-02-14 DIAGNOSIS — M17.11 PRIMARY OSTEOARTHRITIS OF RIGHT KNEE: Primary | ICD-10-CM

## 2024-02-14 DIAGNOSIS — Z09 SURGERY FOLLOW-UP: ICD-10-CM

## 2024-02-14 DIAGNOSIS — Z96.651 PRESENCE OF TOTAL KNEE JOINT PROSTHESIS, RIGHT: ICD-10-CM

## 2024-02-14 PROCEDURE — 1123F ACP DISCUSS/DSCN MKR DOCD: CPT | Performed by: ORTHOPAEDIC SURGERY

## 2024-02-14 PROCEDURE — 99213 OFFICE O/P EST LOW 20 MIN: CPT | Performed by: ORTHOPAEDIC SURGERY

## 2024-02-14 PROCEDURE — G8417 CALC BMI ABV UP PARAM F/U: HCPCS | Performed by: ORTHOPAEDIC SURGERY

## 2024-02-14 PROCEDURE — 1036F TOBACCO NON-USER: CPT | Performed by: ORTHOPAEDIC SURGERY

## 2024-02-14 PROCEDURE — G8428 CUR MEDS NOT DOCUMENT: HCPCS | Performed by: ORTHOPAEDIC SURGERY

## 2024-02-14 PROCEDURE — G8484 FLU IMMUNIZE NO ADMIN: HCPCS | Performed by: ORTHOPAEDIC SURGERY

## 2024-02-14 PROCEDURE — 3017F COLORECTAL CA SCREEN DOC REV: CPT | Performed by: ORTHOPAEDIC SURGERY

## 2024-02-14 NOTE — PROGRESS NOTES
Name: Nicholas Mcdonald  YOB: 1950  Gender: male  MRN: [id number]      right Post-Op TKA annual follow up    This patient returns now not quite 1 year s/p right TKA. The patient is doing well and is happy with their level of function.  He made his appointment a little earlier than his annual follow-up because he had an episode of swelling and discomfort a few weeks ago.  He states his knee is now back to his baseline function which is generally quite good.  The patient states they are able to ascend stairs normally.  The patient ambulates with no assistive device. The patient takes anti-inflammatories or other pain medication rarely for discomfort related to the operative side.           Interval medical and surgical history is noted in the patient history form and updated today.      Past Medical History:    Allergies:  Allergies   Allergen Reactions    Aspartame Other (See Comments)     seizures       Medications:  Current Outpatient Medications   Medication Sig    folic acid (FOLVITE) 1 MG tablet Take 1 tablet by mouth daily    Cholecalciferol (VITAMIN D3) 1.25 MG (44172 UT) CAPS Take by mouth    aspirin EC 81 MG EC tablet Take 1 tablet by mouth daily Daily in morning per patient    amLODIPine (NORVASC) 10 MG tablet Take 1 tablet by mouth daily    losartan (COZAAR) 50 MG tablet Take 1 tablet by mouth nightly    finasteride (PROSCAR) 5 MG tablet Take 1 tablet by mouth daily    tamsulosin (FLOMAX) 0.4 MG capsule Take 1 capsule by mouth in the morning and at bedtime Indications: evening dose at 6 pm    venlafaxine (EFFEXOR XR) 150 MG extended release capsule Take 1 capsule by mouth daily    donepezil (ARICEPT) 10 MG tablet Take 0.5 tablets by mouth every morning    OXcarbazepine (TRILEPTAL) 300 MG tablet Take 1 tablet by mouth 2 times daily     No current facility-administered medications for this visit.       Past Medical history:  Past Medical History:   Diagnosis Date    Arthritis

## 2024-02-22 ENCOUNTER — CLINICAL DOCUMENTATION (OUTPATIENT)
Dept: ORTHOPEDIC SURGERY | Age: 74
End: 2024-02-22

## 2024-03-11 PROCEDURE — 93298 REM INTERROG DEV EVAL SCRMS: CPT | Performed by: INTERNAL MEDICINE

## 2024-03-13 NOTE — PROGRESS NOTES
NEW PATIENT INTAKE    Referral Diagnosis: Benign prostatic hyperplasia with lower urinary tract symptoms    Referring Provider: Desire Arredondo NP     Primary Care Provider: Carmel Chen MD    Presenting Symptoms: Urinary frequency     Social/ Medical/ Surgical History: Updated in Epic    Family History of Cancer/ Hematology Disorders: None reported    Chronological History of Pertinent Events: Mr. Mcdonald is a 73-year-old white male [facility] with lower urinary tract symptoms.      10/6/22: PSA 4.44 (Proficient)     4/28/23: PSA 3.50 (Proficient)    1/19/24: F/u with [facility] for hypertension  -PSA 3.27 (CE/Documents/DOD-[facility] summarization of episode note)  -U/A WNL     2/5/24: [facility] to [facility]    PSA 10/6/22      PSA 4/28/23      Notes from Referring Provider: None    Presented at Tumor Board: N/A    Other Pertinent Information: None

## 2024-03-18 ENCOUNTER — HOSPITAL ENCOUNTER (OUTPATIENT)
Dept: LAB | Age: 74
Discharge: HOME | End: 2024-03-21
Payer: MEDICARE

## 2024-03-18 ENCOUNTER — OFFICE VISIT (OUTPATIENT)
Dept: ONCOLOGY | Age: 74
End: 2024-03-18
Payer: MEDICARE

## 2024-03-18 VITALS
DIASTOLIC BLOOD PRESSURE: 81 MMHG | HEART RATE: 89 BPM | HEIGHT: 67 IN | SYSTOLIC BLOOD PRESSURE: 137 MMHG | BODY MASS INDEX: 29.11 KG/M2 | OXYGEN SATURATION: 96 % | TEMPERATURE: 98.1 F | WEIGHT: 185.5 LBS | RESPIRATION RATE: 16 BRPM

## 2024-03-18 DIAGNOSIS — R35.0 BENIGN PROSTATIC HYPERPLASIA WITH URINARY FREQUENCY: ICD-10-CM

## 2024-03-18 DIAGNOSIS — N40.1 BENIGN PROSTATIC HYPERPLASIA WITH URINARY FREQUENCY: Primary | ICD-10-CM

## 2024-03-18 DIAGNOSIS — R35.0 BENIGN PROSTATIC HYPERPLASIA WITH URINARY FREQUENCY: Primary | ICD-10-CM

## 2024-03-18 DIAGNOSIS — N40.1 BENIGN PROSTATIC HYPERPLASIA WITH URINARY FREQUENCY: ICD-10-CM

## 2024-03-18 LAB
APPEARANCE UR: CLEAR
BILIRUB UR QL: NEGATIVE
COLOR UR: YELLOW
GLUCOSE UR STRIP.AUTO-MCNC: NEGATIVE MG/DL
HGB UR QL STRIP: NEGATIVE
KETONES UR QL STRIP.AUTO: NEGATIVE MG/DL
LEUKOCYTE ESTERASE UR QL STRIP.AUTO: NEGATIVE
NITRITE UR QL STRIP.AUTO: NEGATIVE
PH UR STRIP: 5.5 (ref 5–9)
PROT UR STRIP-MCNC: NEGATIVE MG/DL
PSA SERPL-MCNC: 3 NG/ML
SP GR UR REFRACTOMETRY: 1.02 (ref 1–1.02)
UROBILINOGEN UR QL STRIP.AUTO: 0.2 EU/DL

## 2024-03-18 PROCEDURE — G8417 CALC BMI ABV UP PARAM F/U: HCPCS | Performed by: UROLOGY

## 2024-03-18 PROCEDURE — 84153 ASSAY OF PSA TOTAL: CPT

## 2024-03-18 PROCEDURE — 81003 URINALYSIS AUTO W/O SCOPE: CPT

## 2024-03-18 PROCEDURE — G8427 DOCREV CUR MEDS BY ELIG CLIN: HCPCS | Performed by: UROLOGY

## 2024-03-18 PROCEDURE — 99204 OFFICE O/P NEW MOD 45 MIN: CPT | Performed by: UROLOGY

## 2024-03-18 PROCEDURE — 3017F COLORECTAL CA SCREEN DOC REV: CPT | Performed by: UROLOGY

## 2024-03-18 PROCEDURE — 87086 URINE CULTURE/COLONY COUNT: CPT

## 2024-03-18 PROCEDURE — 3075F SYST BP GE 130 - 139MM HG: CPT | Performed by: UROLOGY

## 2024-03-18 PROCEDURE — 3079F DIAST BP 80-89 MM HG: CPT | Performed by: UROLOGY

## 2024-03-18 PROCEDURE — 1036F TOBACCO NON-USER: CPT | Performed by: UROLOGY

## 2024-03-18 PROCEDURE — G8484 FLU IMMUNIZE NO ADMIN: HCPCS | Performed by: UROLOGY

## 2024-03-18 PROCEDURE — 1123F ACP DISCUSS/DSCN MKR DOCD: CPT | Performed by: UROLOGY

## 2024-03-18 PROCEDURE — 36415 COLL VENOUS BLD VENIPUNCTURE: CPT

## 2024-03-18 NOTE — PATIENT INSTRUCTIONS
Patient Information from Today's Visit        Treatment Summary has been discussed and given to patient:N/A    -Your exam today is normal other than enlargement of your prostate. We are also going to have some lab work done today to further evaluate your symptoms.  -Dr. Steven also feels that your bladder should be evaluated with a cystoscopy. We will do this next week. Make sure to arrive to this appointment with a full bladder.    Follow Up: Cystoscopy next week    Please refer to After Visit Summary or [employer] for upcoming appointment information. If you have any questions regarding your upcoming schedule please reach out to your care team through [employer] or call [phone].          -------------------------------------------------------------------------------------------------------------------  Please call our office at [phone] if you have any  of the following symptoms:   Fever of 100.5 or greater  Chills  Shortness of breath  Swelling or pain in one leg    After office hours an answering service is available and will contact a provider for emergencies or if you are experiencing any of the above symptoms.    Patient does express an interest in My Chart.  My Chart log in information explained on the after visit summary printout at the check-out desk.    Vicky Earl MA

## 2024-03-18 NOTE — PROGRESS NOTES
Urologic Oncology  [facility] & On[facility]  [address] [place] [id number]  [phone]          Nicholas Mcdonald  : 1950      INITIAL EVALUATION    Chief Complaint   Patient presents with    New Patient       HPI:     Nicholas Mcdonald is a 73 y.o. male who [facility] with urinary frequency and history of elevated PSA.    Patient states that he has had significant urinary frequency as well as incomplete voiding for several years despite Flomax and finasteride.  He otherwise denies dysuria or hematuria.    Patient reports a history of elevated PSA with prostate biopsy approximately 3 to 4 years ago which he reports had \"suspicious pathology not diagnostic for cancer.\" He has not had MRI or repeat biopsy since.  His PSA in January was 3.27.  This had previously been 3.50 in 2023 and 4.44 in 2022.      PMH:     Past Medical History:   Diagnosis Date    Arthritis     BPH (benign prostatic hyperplasia)     Cancer (HCC)     skin cancer basal cell    Chronic fatigue     Conversion disorder with seizures or convulsions     History of PFTs 2022    History of transesophageal echocardiography (ZURDO) 2022    EF of 50 - 55%; Moderate regurgitation mitral & tricuspid; severe aortic stenosis    Hypertension     Low back pain     Major depressive disorder     Neck injury 2022    fractured C2; reports neck brace and no surgery    Seizures (HCC)     Sleep apnea     Umbilical hernia        PSH:    Past Surgical History:   Procedure Laterality Date    ABDOMEN SURGERY      repaired chain saw laceration    CARDIAC PROCEDURE N/A 2022    Left heart cath / coronary angiography performed by Godwin Orta MD at [facility] CARDIAC CATH LAB    CARDIAC PROCEDURE N/A 2022    Transcatheter aortic valve replacement performed by Artur Vasquez MD at [facility] MAIN OR    EP DEVICE PROCEDURE N/A 2023    Loop recorder insert

## 2024-03-20 LAB
BACTERIA SPEC CULT: NORMAL
SERVICE CMNT-IMP: NORMAL

## 2024-03-21 DIAGNOSIS — N40.1 BENIGN PROSTATIC HYPERPLASIA WITH URINARY FREQUENCY: Primary | ICD-10-CM

## 2024-03-21 DIAGNOSIS — R35.0 BENIGN PROSTATIC HYPERPLASIA WITH URINARY FREQUENCY: Primary | ICD-10-CM

## 2024-03-25 ENCOUNTER — PROCEDURE VISIT (OUTPATIENT)
Dept: ONCOLOGY | Age: 74
End: 2024-03-25
Payer: MEDICARE

## 2024-03-25 ENCOUNTER — HOSPITAL ENCOUNTER (OUTPATIENT)
Dept: LAB | Age: 74
Discharge: HOME | End: 2024-03-28

## 2024-03-25 VITALS
TEMPERATURE: 97.7 F | DIASTOLIC BLOOD PRESSURE: 85 MMHG | HEIGHT: 67 IN | BODY MASS INDEX: 29.11 KG/M2 | RESPIRATION RATE: 18 BRPM | SYSTOLIC BLOOD PRESSURE: 157 MMHG | WEIGHT: 185.5 LBS | OXYGEN SATURATION: 97 % | HEART RATE: 67 BPM

## 2024-03-25 DIAGNOSIS — N40.1 BENIGN PROSTATIC HYPERPLASIA WITH URINARY FREQUENCY: Primary | ICD-10-CM

## 2024-03-25 DIAGNOSIS — R35.0 BENIGN PROSTATIC HYPERPLASIA WITH URINARY FREQUENCY: Primary | ICD-10-CM

## 2024-03-25 LAB
BILIRUBIN, URINE, POC: NEGATIVE
BLOOD URINE, POC: NEGATIVE
GLUCOSE URINE, POC: NEGATIVE
KETONES, URINE, POC: NEGATIVE
LEUKOCYTE ESTERASE, URINE, POC: NEGATIVE
NITRITE, URINE, POC: NEGATIVE
PH, URINE, POC: 6 (ref 4.6–8)
PROTEIN,URINE, POC: NEGATIVE
SPECIFIC GRAVITY, URINE, POC: 1.02 (ref 1–1.03)
URINALYSIS CLARITY, POC: CLEAR
URINALYSIS COLOR, POC: YELLOW
UROBILINOGEN, POC: NORMAL

## 2024-03-25 PROCEDURE — 52000 CYSTOURETHROSCOPY: CPT | Performed by: UROLOGY

## 2024-03-25 ASSESSMENT — PATIENT HEALTH QUESTIONNAIRE - PHQ9
SUM OF ALL RESPONSES TO PHQ QUESTIONS 1-9: 0
SUM OF ALL RESPONSES TO PHQ9 QUESTIONS 1 & 2: 0
SUM OF ALL RESPONSES TO PHQ QUESTIONS 1-9: 0
SUM OF ALL RESPONSES TO PHQ QUESTIONS 1-9: 0
1. LITTLE INTEREST OR PLEASURE IN DOING THINGS: NOT AT ALL
SUM OF ALL RESPONSES TO PHQ QUESTIONS 1-9: 0
2. FEELING DOWN, DEPRESSED OR HOPELESS: NOT AT ALL

## 2024-03-25 NOTE — PATIENT INSTRUCTIONS
Patient Information from Today's Visit        Treatment Summary has been discussed and given to patient:N/A  Follow Up:         Please refer to After Visit Summary or [employer] for upcoming appointment information. If you have any questions regarding your upcoming schedule please reach out to your care team through [employer] or call [phone].          -------------------------------------------------------------------------------------------------------------------  Please call our office at [phone] if you have any  of the following symptoms:   Fever of 100.5 or greater  Chills  Shortness of breath  Swelling or pain in one leg    After office hours an answering service is available and will contact a provider for emergencies or if you are experiencing any of the above symptoms.    Patient does express an interest in My Chart.  My Chart log in information explained on the after visit summary printout at the check-out desk.    Mishel

## 2024-03-25 NOTE — PROGRESS NOTES
FOLLOW UP CYSTOSCOPY PROCEDURE CLINIC NOTE      INTERVAL HISTORY: Patient has a history of BPH and lower urinary tract symptoms.  When I last saw him his IPSS was 23 and he remains on Flomax and finasteride.  We checked his PSA and it was 3.0 which is actually 6 when accounting for the finasteride effect.    I checked a urinalysis and culture on him last appointment that were normal.  His LUCIANO was unremarkable.    He is here today for cystoscopy to ensure there is nothing going on in his bladder that would account for his LUTS.        From previous note:  Nicholas Mcdonald is a 73 y.o. male who [facility] with urinary frequency and history of elevated PSA.     Patient states that he has had significant urinary frequency as well as incomplete voiding for several years despite Flomax and finasteride.  He otherwise denies dysuria or hematuria.     Patient reports a history of elevated PSA with prostate biopsy approximately 3 to 4 years ago which he reports had \"suspicious pathology not diagnostic for cancer.\" He has not had MRI or repeat biopsy since.  His PSA in January was 3.27.  This had previously been 3.50 in April 2023 and 4.44 in October 2022.     HISTORY OF PRESENT ILLNESS: Mr. Nicholas Mcdonald is a 73 y.o. male with a diagnosis of BPH with urinary frequency and history of elevated PSA  with above history; patient is here today for cystoscopy.      Past medical history, surgical history, medications, allergies, family history and social history were reviewed and are unchanged other than what is noted above. Patient denies any hematuria or new lower urinary tract symptoms.      REVIEW OF SYSTEMS: As above. All other systems negative.    ALLERGIES:  Allergies   Allergen Reactions    Aspartame Other (See Comments)     seizures       PHYSICAL EXAMINATION:  BP (!) 157/85   Pulse 67   Temp 97.7 °F (36.5 °C) (Oral)   Resp 18   Ht 1.702 m (5' 7\")   Wt 84.1 kg (185 lb 8

## 2024-03-25 NOTE — PROGRESS NOTES
Pre Cystoscopy   At risk factors reviewed:  Autoimmune diseases: no  Artificial Joints: yes knee  Mechanical heart valve/pacemaker: heart valve, loop recorder  Indwelling ureteral stent: no  Indwelling catheter: no  Elderly >80: no  Smoker: no  Oral steroid/prednisone use: no  Low weight: no  Hx of frequent UTI's: no  Prolonged hospital stay in the past 6mths (>10days): no  Diabetes:  no    Provider informed of at risk factors: yes    ABX given: no

## 2024-04-29 PROCEDURE — 93298 REM INTERROG DEV EVAL SCRMS: CPT | Performed by: INTERNAL MEDICINE

## 2024-05-14 NOTE — PROGRESS NOTES
[facility]  [address]  [place] [address]  [phone]          Nicholas Mcdonald  : 1950    Chief Complaint   Patient presents with    New Patient    Benign Prostatic Hypertrophy          HPI     Nicholas Mcdonald is a 73 y.o. male with a PMH of BPH/LUTS and elevated PSA who is referred to clinic by Dr. Steven for LUTS evaluation.     Seen 3/2024 by Dr. Steven for cystoscopy which showed trilobe BPH amenable to TURP but not urolift.  He is on flomax + finasteride but has bothersome U/F/weak stream/hesitancy/incomplete emptying despite the medications.  He is frustrated by lack of improvement with medications and wants to discuss surgical options today.  No history of  procedures.      Patient reports a history of elevated PSA with prostate biopsy approximately 3 to 4 years ago which he reports had \"suspicious pathology not diagnostic for cancer.\" He has not had MRI or repeat biopsy since.  His PSA in January was 3.27.  This had previously been 3.50 in 2023 and 4.44 in 2022.     PVR: 29 cc    IPSS: 25     Lab Results   Component Value Date    PSA 3.0 2024     Past Medical History:   Diagnosis Date    Arthritis     BPH (benign prostatic hyperplasia)     Cancer (HCC)     skin cancer basal cell    Chronic fatigue     Conversion disorder with seizures or convulsions     Elevated PSA     History of PFTs 2022    History of transesophageal echocardiography (ZURDO) 2022    EF of 50 - 55%; Moderate regurgitation mitral & tricuspid; severe aortic stenosis    Hypertension     Low back pain     Major depressive disorder     Neck injury 2022    fractured C2; reports neck brace and no surgery    Seizures (HCC)     Sleep apnea     Umbilical hernia      Past Surgical History:   Procedure Laterality Date    ABDOMEN SURGERY      repaired chain saw laceration    CARDIAC PROCEDURE N/A 2022    Left heart cath / coronary

## 2024-05-15 ENCOUNTER — TELEPHONE (OUTPATIENT)
Dept: UROLOGY | Age: 74
End: 2024-05-15

## 2024-05-15 ENCOUNTER — OFFICE VISIT (OUTPATIENT)
Dept: UROLOGY | Age: 74
End: 2024-05-15
Payer: MEDICARE

## 2024-05-15 DIAGNOSIS — N40.1 BENIGN PROSTATIC HYPERPLASIA WITH LOWER URINARY TRACT SYMPTOMS, SYMPTOM DETAILS UNSPECIFIED: Primary | ICD-10-CM

## 2024-05-15 LAB
BILIRUBIN, URINE, POC: NEGATIVE
BLOOD URINE, POC: NEGATIVE
GLUCOSE URINE, POC: NEGATIVE
KETONES, URINE, POC: NEGATIVE
LEUKOCYTE ESTERASE, URINE, POC: NEGATIVE
NITRITE, URINE, POC: NEGATIVE
PH, URINE, POC: 6 (ref 4.6–8)
PROTEIN,URINE, POC: NEGATIVE
PVR, POC: 29 CC
SPECIFIC GRAVITY, URINE, POC: 1.01 (ref 1–1.03)
URINALYSIS CLARITY, POC: NORMAL
URINALYSIS COLOR, POC: NORMAL
UROBILINOGEN, POC: NORMAL

## 2024-05-15 PROCEDURE — 51798 US URINE CAPACITY MEASURE: CPT | Performed by: UROLOGY

## 2024-05-15 PROCEDURE — 81003 URINALYSIS AUTO W/O SCOPE: CPT | Performed by: UROLOGY

## 2024-05-15 PROCEDURE — 99204 OFFICE O/P NEW MOD 45 MIN: CPT | Performed by: UROLOGY

## 2024-05-15 PROCEDURE — 1123F ACP DISCUSS/DSCN MKR DOCD: CPT | Performed by: UROLOGY

## 2024-05-15 ASSESSMENT — ENCOUNTER SYMPTOMS
EYE PAIN: 0
VOMITING: 0
EYE DISCHARGE: 0
CONSTIPATION: 0
BACK PAIN: 0
NAUSEA: 0
SHORTNESS OF BREATH: 0
HEARTBURN: 0
WHEEZING: 0
COUGH: 0
SKIN LESIONS: 0
ABDOMINAL PAIN: 0
INDIGESTION: 0
BLOOD IN STOOL: 0
DIARRHEA: 0

## 2024-05-15 NOTE — TELEPHONE ENCOUNTER
----- Message from Magan Molina MD sent at 5/15/2024  8:48 AM EDT -----  Regarding: Surgery Scheduling  Hospital: [facility]    Surgeon: Tracy    Assist: NONE    Diagnosis: BPH/LUTS    Procedure: cystoscopy, bipolar TURP    Posting time: 90 minutes    Special Instruments Needed:  NONE    Anesthesia: GENERAL    Labs: CBC, BMP, U/A    Tests: EKG per anesthesia    Blood: NONE    Bowel Prep: NONE    Special Instructions: need tube for relaxation    Orders/HandP:     Follow up appointment: 2 weeks with NP and 4 month follow up with me

## 2024-05-17 ENCOUNTER — OFFICE VISIT (OUTPATIENT)
Age: 74
End: 2024-05-17

## 2024-05-17 VITALS
HEIGHT: 67 IN | HEART RATE: 84 BPM | BODY MASS INDEX: 29.51 KG/M2 | DIASTOLIC BLOOD PRESSURE: 78 MMHG | WEIGHT: 188 LBS | SYSTOLIC BLOOD PRESSURE: 134 MMHG

## 2024-05-17 DIAGNOSIS — I10 PRIMARY HYPERTENSION: Chronic | ICD-10-CM

## 2024-05-17 DIAGNOSIS — Z95.2 S/P TAVR (TRANSCATHETER AORTIC VALVE REPLACEMENT): Primary | ICD-10-CM

## 2024-05-17 DIAGNOSIS — R53.82 CHRONIC FATIGUE: Chronic | ICD-10-CM

## 2024-05-17 PROBLEM — I47.29 NSVT (NONSUSTAINED VENTRICULAR TACHYCARDIA) (HCC): Status: RESOLVED | Noted: 2023-08-12 | Resolved: 2024-05-17

## 2024-05-17 PROBLEM — I77.9 BILATERAL CAROTID ARTERY DISEASE (HCC): Status: RESOLVED | Noted: 2023-08-14 | Resolved: 2024-05-17

## 2024-05-17 PROBLEM — I65.23 CAROTID ATHEROSCLEROSIS, BILATERAL: Status: RESOLVED | Noted: 2023-02-21 | Resolved: 2024-05-17

## 2024-05-17 ASSESSMENT — ENCOUNTER SYMPTOMS
ABDOMINAL PAIN: 0
COUGH: 0
BACK PAIN: 0

## 2024-05-17 NOTE — PROGRESS NOTES
[facility]ARDIOLOG[facility]  [address] [place] [address], [address]  [place] [address]      24      NAME:  Nicholas Mcdonald  : 1950  MRN: [id number]      SUBJECTIVE:   Nicholas Mcdonald is a 73 y.o. male seen for a follow up visit regarding the following:     Chief Complaint   Patient presents with    Hypertension       HPI:   73 y.o. male with history of critical aortic stenosis.  He had type I bicuspid valve.  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  Echo 2023 with normal LVEF and TAVR function.   No arrhythmias on ILR.          Past Medical History, Past Surgical History, Family history, Social History, and Medications were all reviewed with the patient today and updated as necessary.     Current Outpatient Medications   Medication Sig Dispense Refill    folic acid (FOLVITE) 1 MG tablet Take 1 tablet by mouth daily      Cholecalciferol (VITAMIN D3) 1.25 MG (47172 UT) CAPS Take by mouth      aspirin EC 81 MG EC tablet Take 1 tablet by mouth daily Daily in morning per patient 30 tablet 1    amLODIPine (NORVASC) 10 MG tablet Take 1 tablet by mouth daily      losartan (COZAAR) 50 MG tablet Take 1 tablet by mouth nightly      finasteride (PROSCAR) 5 MG tablet Take 1 tablet by mouth daily      tamsulosin (FLOMAX) 0.4 MG capsule Take 1 capsule by mouth in the morning and at bedtime Indications: evening dose at 6 pm      venlafaxine (EFFEXOR XR) 150 MG extended release capsule Take 1 capsule by mouth daily      donepezil (ARICEPT) 10 MG tablet Take 0.5 tablets by mouth every morning      OXcarbazepine (TRILEPTAL) 300 MG tablet Take 1 tablet by mouth 2 times daily       No current facility-administered medications for this visit.     Patient Active Problem List    Diagnosis Date Noted    Dark stools 2023     Priority: Medium    S/P TAVR (transcatheter aortic valve replacement) 10/03/2022     Priority: Medium     2022:  29mm Graff vivi ultra valve      Chronic

## 2024-05-28 ENCOUNTER — PREP FOR PROCEDURE (OUTPATIENT)
Dept: UROLOGY | Age: 74
End: 2024-05-28

## 2024-05-28 DIAGNOSIS — R35.0 BENIGN PROSTATIC HYPERPLASIA WITH URINARY FREQUENCY: Primary | ICD-10-CM

## 2024-05-28 DIAGNOSIS — N40.1 BENIGN PROSTATIC HYPERPLASIA WITH URINARY FREQUENCY: Primary | ICD-10-CM

## 2024-05-28 RX ORDER — SODIUM CHLORIDE 9 MG/ML
INJECTION, SOLUTION INTRAVENOUS PRN
Status: CANCELLED | OUTPATIENT
Start: 2024-05-28

## 2024-05-28 RX ORDER — SODIUM CHLORIDE 0.9 % (FLUSH) 0.9 %
5-40 SYRINGE (ML) INJECTION EVERY 12 HOURS SCHEDULED
Status: CANCELLED | OUTPATIENT
Start: 2024-05-28

## 2024-05-28 RX ORDER — SODIUM CHLORIDE 0.9 % (FLUSH) 0.9 %
5-40 SYRINGE (ML) INJECTION PRN
Status: CANCELLED | OUTPATIENT
Start: 2024-05-28

## 2024-05-28 NOTE — TELEPHONE ENCOUNTER
Procedures: Procedure(s) with comments:   CYSTOSCOPY, BIOPOLAR TRANSURETHRAL RESECTION PROSTATE - NEED TUBE FOR RELAXATION   Date: 7/2/2024   Time: 1300   Location: [facility] MAIN OR 01 CYSTO     Scheduled.  Please complete orders.  Post op appt's scheduled.

## 2024-06-25 ENCOUNTER — HOSPITAL ENCOUNTER (OUTPATIENT)
Dept: SURGERY | Age: 74
Discharge: HOME | End: 2024-06-28
Payer: OTHER GOVERNMENT

## 2024-06-25 VITALS
HEIGHT: 66 IN | OXYGEN SATURATION: 94 % | RESPIRATION RATE: 16 BRPM | SYSTOLIC BLOOD PRESSURE: 145 MMHG | TEMPERATURE: 97.1 F | BODY MASS INDEX: 29.7 KG/M2 | HEART RATE: 86 BPM | WEIGHT: 184.8 LBS | DIASTOLIC BLOOD PRESSURE: 72 MMHG

## 2024-06-25 DIAGNOSIS — R35.0 BENIGN PROSTATIC HYPERPLASIA WITH URINARY FREQUENCY: ICD-10-CM

## 2024-06-25 DIAGNOSIS — N40.1 BENIGN PROSTATIC HYPERPLASIA WITH URINARY FREQUENCY: ICD-10-CM

## 2024-06-25 LAB
ANION GAP SERPL CALC-SCNC: 10 MMOL/L (ref 9–18)
APPEARANCE UR: CLEAR
BILIRUB UR QL: NEGATIVE
BUN SERPL-MCNC: 13 MG/DL (ref 8–23)
CALCIUM SERPL-MCNC: 9 MG/DL (ref 8.8–10.2)
CHLORIDE SERPL-SCNC: 104 MMOL/L (ref 98–107)
CO2 SERPL-SCNC: 28 MMOL/L (ref 20–28)
COLOR UR: NORMAL
CREAT SERPL-MCNC: 0.93 MG/DL (ref 0.8–1.3)
ERYTHROCYTE [DISTWIDTH] IN BLOOD BY AUTOMATED COUNT: 13.2 % (ref 11.9–14.6)
GLUCOSE SERPL-MCNC: 122 MG/DL (ref 70–99)
GLUCOSE UR STRIP.AUTO-MCNC: NEGATIVE MG/DL
HCT VFR BLD AUTO: 42.9 % (ref 41.1–50.3)
HGB BLD-MCNC: 14.1 G/DL (ref 13.6–17.2)
HGB UR QL STRIP: NEGATIVE
KETONES UR QL STRIP.AUTO: NEGATIVE MG/DL
LEUKOCYTE ESTERASE UR QL STRIP.AUTO: NEGATIVE
MCH RBC QN AUTO: 32.8 PG (ref 26.1–32.9)
MCHC RBC AUTO-ENTMCNC: 32.9 G/DL (ref 31.4–35)
MCV RBC AUTO: 99.8 FL (ref 82–102)
NITRITE UR QL STRIP.AUTO: NEGATIVE
NRBC # BLD: 0 K/UL (ref 0–0.2)
PH UR STRIP: 5.5 (ref 5–9)
PLATELET # BLD AUTO: 244 K/UL (ref 150–450)
PMV BLD AUTO: 8.8 FL (ref 9.4–12.3)
POTASSIUM SERPL-SCNC: 4.3 MMOL/L (ref 3.5–5.1)
PROT UR STRIP-MCNC: NEGATIVE MG/DL
RBC # BLD AUTO: 4.3 M/UL (ref 4.23–5.6)
SODIUM SERPL-SCNC: 142 MMOL/L (ref 136–145)
SP GR UR REFRACTOMETRY: 1.02 (ref 1–1.02)
UROBILINOGEN UR QL STRIP.AUTO: 0.2 EU/DL (ref 0.2–1)
WBC # BLD AUTO: 4.8 K/UL (ref 4.3–11.1)

## 2024-06-25 PROCEDURE — 81003 URINALYSIS AUTO W/O SCOPE: CPT

## 2024-06-25 PROCEDURE — 85027 COMPLETE CBC AUTOMATED: CPT

## 2024-06-25 PROCEDURE — 80048 BASIC METABOLIC PNL TOTAL CA: CPT

## 2024-06-25 PROCEDURE — 87086 URINE CULTURE/COLONY COUNT: CPT

## 2024-06-25 RX ORDER — M-VIT,TX,IRON,MINS/CALC/FOLIC 27MG-0.4MG
1 TABLET ORAL DAILY
COMMUNITY

## 2024-06-25 NOTE — PERIOP NOTE
Labs within anesthesia guidelines, no follow-up required. Labs automatically routed to ordering provider via Epic documentation.   
have any questions, please call [facility] [phone].    A copy of this note was provided to the patient for reference.

## 2024-06-27 LAB
BACTERIA SPEC CULT: NORMAL
SERVICE CMNT-IMP: NORMAL

## 2024-07-02 ENCOUNTER — ANESTHESIA EVENT (OUTPATIENT)
Dept: SURGERY | Age: 74
End: 2024-07-02
Payer: OTHER GOVERNMENT

## 2024-07-02 ENCOUNTER — ANESTHESIA (OUTPATIENT)
Dept: SURGERY | Age: 74
End: 2024-07-02
Payer: OTHER GOVERNMENT

## 2024-07-02 ENCOUNTER — HOSPITAL ENCOUNTER (OUTPATIENT)
Age: 74
Setting detail: OBSERVATION
Discharge: HOME | End: 2024-07-04
Attending: UROLOGY | Admitting: UROLOGY
Payer: OTHER GOVERNMENT

## 2024-07-02 PROBLEM — N40.1 BPH WITH OBSTRUCTION/LOWER URINARY TRACT SYMPTOMS: Status: ACTIVE | Noted: 2024-07-02

## 2024-07-02 PROBLEM — N13.8 BPH WITH OBSTRUCTION/LOWER URINARY TRACT SYMPTOMS: Status: ACTIVE | Noted: 2024-07-02

## 2024-07-02 LAB
ABO + RH BLD: NORMAL
APPEARANCE UR: CLEAR
BILIRUB UR QL: NEGATIVE
BLOOD GROUP ANTIBODIES SERPL: NORMAL
COLOR UR: NORMAL
GLUCOSE UR STRIP.AUTO-MCNC: NEGATIVE MG/DL
HGB UR QL STRIP: NEGATIVE
KETONES UR QL STRIP.AUTO: NEGATIVE MG/DL
LEUKOCYTE ESTERASE UR QL STRIP.AUTO: NEGATIVE
NITRITE UR QL STRIP.AUTO: NEGATIVE
PH UR STRIP: 6 (ref 5–9)
PROT UR STRIP-MCNC: NEGATIVE MG/DL
SP GR UR REFRACTOMETRY: 1.01 (ref 1–1.02)
SPECIMEN EXP DATE BLD: NORMAL
UROBILINOGEN UR QL STRIP.AUTO: 0.2 EU/DL (ref 0.2–1)

## 2024-07-02 PROCEDURE — 6360000002 HC RX W HCPCS: Performed by: UROLOGY

## 2024-07-02 PROCEDURE — 3600000014 HC SURGERY LEVEL 4 ADDTL 15MIN: Performed by: UROLOGY

## 2024-07-02 PROCEDURE — 7100000001 HC PACU RECOVERY - ADDTL 15 MIN: Performed by: UROLOGY

## 2024-07-02 PROCEDURE — 2500000003 HC RX 250 WO HCPCS: Performed by: NURSE ANESTHETIST, CERTIFIED REGISTERED

## 2024-07-02 PROCEDURE — 6370000000 HC RX 637 (ALT 250 FOR IP): Performed by: UROLOGY

## 2024-07-02 PROCEDURE — 88305 TISSUE EXAM BY PATHOLOGIST: CPT

## 2024-07-02 PROCEDURE — 81003 URINALYSIS AUTO W/O SCOPE: CPT

## 2024-07-02 PROCEDURE — 52601 PROSTATECTOMY (TURP): CPT | Performed by: UROLOGY

## 2024-07-02 PROCEDURE — 3700000001 HC ADD 15 MINUTES (ANESTHESIA): Performed by: UROLOGY

## 2024-07-02 PROCEDURE — 6360000002 HC RX W HCPCS: Performed by: NURSE ANESTHETIST, CERTIFIED REGISTERED

## 2024-07-02 PROCEDURE — 3600000004 HC SURGERY LEVEL 4 BASE: Performed by: UROLOGY

## 2024-07-02 PROCEDURE — 7100000000 HC PACU RECOVERY - FIRST 15 MIN: Performed by: UROLOGY

## 2024-07-02 PROCEDURE — 2709999900 HC NON-CHARGEABLE SUPPLY: Performed by: UROLOGY

## 2024-07-02 PROCEDURE — G0378 HOSPITAL OBSERVATION PER HR: HCPCS

## 2024-07-02 PROCEDURE — 86901 BLOOD TYPING SEROLOGIC RH(D): CPT

## 2024-07-02 PROCEDURE — 2580000003 HC RX 258: Performed by: UROLOGY

## 2024-07-02 PROCEDURE — 86900 BLOOD TYPING SEROLOGIC ABO: CPT

## 2024-07-02 PROCEDURE — 2580000003 HC RX 258: Performed by: ANESTHESIOLOGY

## 2024-07-02 PROCEDURE — 3700000000 HC ANESTHESIA ATTENDED CARE: Performed by: UROLOGY

## 2024-07-02 PROCEDURE — 2720000010 HC SURG SUPPLY STERILE: Performed by: UROLOGY

## 2024-07-02 PROCEDURE — 6370000000 HC RX 637 (ALT 250 FOR IP): Performed by: ANESTHESIOLOGY

## 2024-07-02 PROCEDURE — 86850 RBC ANTIBODY SCREEN: CPT

## 2024-07-02 RX ORDER — ROCURONIUM BROMIDE 10 MG/ML
INJECTION, SOLUTION INTRAVENOUS PRN
Status: DISCONTINUED | OUTPATIENT
Start: 2024-07-02 | End: 2024-07-02 | Stop reason: SDUPTHER

## 2024-07-02 RX ORDER — MORPHINE SULFATE 4 MG/ML
2 INJECTION, SOLUTION INTRAMUSCULAR; INTRAVENOUS
Status: DISCONTINUED | OUTPATIENT
Start: 2024-07-02 | End: 2024-07-04 | Stop reason: HOSPADM

## 2024-07-02 RX ORDER — PROPOFOL 10 MG/ML
INJECTION, EMULSION INTRAVENOUS PRN
Status: DISCONTINUED | OUTPATIENT
Start: 2024-07-02 | End: 2024-07-02 | Stop reason: SDUPTHER

## 2024-07-02 RX ORDER — SODIUM CHLORIDE 0.9 % (FLUSH) 0.9 %
5-40 SYRINGE (ML) INJECTION PRN
Status: DISCONTINUED | OUTPATIENT
Start: 2024-07-02 | End: 2024-07-02 | Stop reason: HOSPADM

## 2024-07-02 RX ORDER — LOSARTAN POTASSIUM 50 MG/1
50 TABLET ORAL NIGHTLY
Status: DISCONTINUED | OUTPATIENT
Start: 2024-07-02 | End: 2024-07-04 | Stop reason: HOSPADM

## 2024-07-02 RX ORDER — DIPHENHYDRAMINE HYDROCHLORIDE 50 MG/ML
12.5 INJECTION INTRAMUSCULAR; INTRAVENOUS
Status: DISCONTINUED | OUTPATIENT
Start: 2024-07-02 | End: 2024-07-02 | Stop reason: HOSPADM

## 2024-07-02 RX ORDER — MIDAZOLAM HYDROCHLORIDE 2 MG/2ML
2 INJECTION, SOLUTION INTRAMUSCULAR; INTRAVENOUS
Status: DISCONTINUED | OUTPATIENT
Start: 2024-07-02 | End: 2024-07-02 | Stop reason: HOSPADM

## 2024-07-02 RX ORDER — SODIUM CHLORIDE 0.9 % (FLUSH) 0.9 %
5-40 SYRINGE (ML) INJECTION EVERY 12 HOURS SCHEDULED
Status: DISCONTINUED | OUTPATIENT
Start: 2024-07-02 | End: 2024-07-04 | Stop reason: HOSPADM

## 2024-07-02 RX ORDER — TAMSULOSIN HYDROCHLORIDE 0.4 MG/1
0.4 CAPSULE ORAL 2 TIMES DAILY
Status: DISCONTINUED | OUTPATIENT
Start: 2024-07-02 | End: 2024-07-04 | Stop reason: HOSPADM

## 2024-07-02 RX ORDER — DEXAMETHASONE SODIUM PHOSPHATE 4 MG/ML
INJECTION, SOLUTION INTRA-ARTICULAR; INTRALESIONAL; INTRAMUSCULAR; INTRAVENOUS; SOFT TISSUE PRN
Status: DISCONTINUED | OUTPATIENT
Start: 2024-07-02 | End: 2024-07-02 | Stop reason: SDUPTHER

## 2024-07-02 RX ORDER — EPHEDRINE SULFATE 5 MG/ML
INJECTION INTRAVENOUS PRN
Status: DISCONTINUED | OUTPATIENT
Start: 2024-07-02 | End: 2024-07-02 | Stop reason: SDUPTHER

## 2024-07-02 RX ORDER — SODIUM CHLORIDE, SODIUM LACTATE, POTASSIUM CHLORIDE, CALCIUM CHLORIDE 600; 310; 30; 20 MG/100ML; MG/100ML; MG/100ML; MG/100ML
INJECTION, SOLUTION INTRAVENOUS CONTINUOUS
Status: DISCONTINUED | OUTPATIENT
Start: 2024-07-02 | End: 2024-07-02 | Stop reason: HOSPADM

## 2024-07-02 RX ORDER — SODIUM CHLORIDE 9 MG/ML
INJECTION, SOLUTION INTRAVENOUS PRN
Status: DISCONTINUED | OUTPATIENT
Start: 2024-07-02 | End: 2024-07-04 | Stop reason: HOSPADM

## 2024-07-02 RX ORDER — AMLODIPINE BESYLATE 10 MG/1
10 TABLET ORAL DAILY
Status: DISCONTINUED | OUTPATIENT
Start: 2024-07-03 | End: 2024-07-04 | Stop reason: HOSPADM

## 2024-07-02 RX ORDER — SODIUM CHLORIDE 0.9 % (FLUSH) 0.9 %
5-40 SYRINGE (ML) INJECTION EVERY 12 HOURS SCHEDULED
Status: DISCONTINUED | OUTPATIENT
Start: 2024-07-02 | End: 2024-07-02 | Stop reason: HOSPADM

## 2024-07-02 RX ORDER — ACETAMINOPHEN 500 MG
1000 TABLET ORAL ONCE
Status: COMPLETED | OUTPATIENT
Start: 2024-07-02 | End: 2024-07-02

## 2024-07-02 RX ORDER — SENNA AND DOCUSATE SODIUM 50; 8.6 MG/1; MG/1
1 TABLET, FILM COATED ORAL 2 TIMES DAILY
Status: DISCONTINUED | OUTPATIENT
Start: 2024-07-02 | End: 2024-07-04 | Stop reason: HOSPADM

## 2024-07-02 RX ORDER — LIDOCAINE HYDROCHLORIDE 20 MG/ML
INJECTION, SOLUTION EPIDURAL; INFILTRATION; INTRACAUDAL; PERINEURAL PRN
Status: DISCONTINUED | OUTPATIENT
Start: 2024-07-02 | End: 2024-07-02 | Stop reason: SDUPTHER

## 2024-07-02 RX ORDER — ONDANSETRON 2 MG/ML
4 INJECTION INTRAMUSCULAR; INTRAVENOUS EVERY 6 HOURS PRN
Status: DISCONTINUED | OUTPATIENT
Start: 2024-07-02 | End: 2024-07-04 | Stop reason: HOSPADM

## 2024-07-02 RX ORDER — ONDANSETRON 2 MG/ML
4 INJECTION INTRAMUSCULAR; INTRAVENOUS
Status: DISCONTINUED | OUTPATIENT
Start: 2024-07-02 | End: 2024-07-02 | Stop reason: HOSPADM

## 2024-07-02 RX ORDER — MAGNESIUM SULFATE IN WATER 40 MG/ML
2000 INJECTION, SOLUTION INTRAVENOUS PRN
Status: DISCONTINUED | OUTPATIENT
Start: 2024-07-02 | End: 2024-07-04 | Stop reason: HOSPADM

## 2024-07-02 RX ORDER — SODIUM CHLORIDE 0.9 % (FLUSH) 0.9 %
5-40 SYRINGE (ML) INJECTION PRN
Status: DISCONTINUED | OUTPATIENT
Start: 2024-07-02 | End: 2024-07-04 | Stop reason: HOSPADM

## 2024-07-02 RX ORDER — SODIUM CHLORIDE 9 MG/ML
INJECTION, SOLUTION INTRAVENOUS PRN
Status: DISCONTINUED | OUTPATIENT
Start: 2024-07-02 | End: 2024-07-02 | Stop reason: HOSPADM

## 2024-07-02 RX ORDER — GLYCOPYRROLATE 0.2 MG/ML
INJECTION INTRAMUSCULAR; INTRAVENOUS PRN
Status: DISCONTINUED | OUTPATIENT
Start: 2024-07-02 | End: 2024-07-02 | Stop reason: SDUPTHER

## 2024-07-02 RX ORDER — SODIUM CHLORIDE 9 MG/ML
INJECTION, SOLUTION INTRAVENOUS CONTINUOUS
Status: DISCONTINUED | OUTPATIENT
Start: 2024-07-02 | End: 2024-07-04 | Stop reason: HOSPADM

## 2024-07-02 RX ORDER — POTASSIUM CHLORIDE 7.45 MG/ML
10 INJECTION INTRAVENOUS PRN
Status: DISCONTINUED | OUTPATIENT
Start: 2024-07-02 | End: 2024-07-04 | Stop reason: HOSPADM

## 2024-07-02 RX ORDER — FENTANYL CITRATE 50 UG/ML
INJECTION, SOLUTION INTRAMUSCULAR; INTRAVENOUS PRN
Status: DISCONTINUED | OUTPATIENT
Start: 2024-07-02 | End: 2024-07-02 | Stop reason: SDUPTHER

## 2024-07-02 RX ORDER — OXYCODONE HYDROCHLORIDE AND ACETAMINOPHEN 5; 325 MG/1; MG/1
1 TABLET ORAL EVERY 4 HOURS PRN
Status: DISCONTINUED | OUTPATIENT
Start: 2024-07-02 | End: 2024-07-04 | Stop reason: HOSPADM

## 2024-07-02 RX ORDER — OXYCODONE HYDROCHLORIDE 5 MG/1
5 TABLET ORAL
Status: DISCONTINUED | OUTPATIENT
Start: 2024-07-02 | End: 2024-07-02 | Stop reason: HOSPADM

## 2024-07-02 RX ORDER — FINASTERIDE 5 MG/1
5 TABLET, FILM COATED ORAL DAILY
Status: DISCONTINUED | OUTPATIENT
Start: 2024-07-03 | End: 2024-07-04 | Stop reason: HOSPADM

## 2024-07-02 RX ORDER — POTASSIUM CHLORIDE 20 MEQ/1
40 TABLET, EXTENDED RELEASE ORAL PRN
Status: DISCONTINUED | OUTPATIENT
Start: 2024-07-02 | End: 2024-07-04 | Stop reason: HOSPADM

## 2024-07-02 RX ORDER — OXCARBAZEPINE 300 MG/1
300 TABLET, FILM COATED ORAL 2 TIMES DAILY
Status: DISCONTINUED | OUTPATIENT
Start: 2024-07-02 | End: 2024-07-04 | Stop reason: HOSPADM

## 2024-07-02 RX ORDER — ONDANSETRON 2 MG/ML
INJECTION INTRAMUSCULAR; INTRAVENOUS PRN
Status: DISCONTINUED | OUTPATIENT
Start: 2024-07-02 | End: 2024-07-02 | Stop reason: SDUPTHER

## 2024-07-02 RX ORDER — VENLAFAXINE HYDROCHLORIDE 150 MG/1
150 CAPSULE, EXTENDED RELEASE ORAL DAILY
Status: DISCONTINUED | OUTPATIENT
Start: 2024-07-03 | End: 2024-07-04 | Stop reason: HOSPADM

## 2024-07-02 RX ORDER — DONEPEZIL HYDROCHLORIDE 5 MG/1
5 TABLET, FILM COATED ORAL EVERY MORNING
Status: DISCONTINUED | OUTPATIENT
Start: 2024-07-03 | End: 2024-07-04 | Stop reason: HOSPADM

## 2024-07-02 RX ORDER — HYDROMORPHONE HYDROCHLORIDE 2 MG/ML
0.5 INJECTION, SOLUTION INTRAMUSCULAR; INTRAVENOUS; SUBCUTANEOUS EVERY 5 MIN PRN
Status: DISCONTINUED | OUTPATIENT
Start: 2024-07-02 | End: 2024-07-02 | Stop reason: HOSPADM

## 2024-07-02 RX ORDER — NALOXONE HYDROCHLORIDE 0.4 MG/ML
INJECTION, SOLUTION INTRAMUSCULAR; INTRAVENOUS; SUBCUTANEOUS PRN
Status: DISCONTINUED | OUTPATIENT
Start: 2024-07-02 | End: 2024-07-02 | Stop reason: HOSPADM

## 2024-07-02 RX ORDER — FENTANYL CITRATE 50 UG/ML
100 INJECTION, SOLUTION INTRAMUSCULAR; INTRAVENOUS
Status: DISCONTINUED | OUTPATIENT
Start: 2024-07-02 | End: 2024-07-02 | Stop reason: HOSPADM

## 2024-07-02 RX ORDER — ONDANSETRON 4 MG/1
4 TABLET, ORALLY DISINTEGRATING ORAL EVERY 8 HOURS PRN
Status: DISCONTINUED | OUTPATIENT
Start: 2024-07-02 | End: 2024-07-04 | Stop reason: HOSPADM

## 2024-07-02 RX ORDER — NEOSTIGMINE METHYLSULFATE 1 MG/ML
INJECTION, SOLUTION INTRAVENOUS PRN
Status: DISCONTINUED | OUTPATIENT
Start: 2024-07-02 | End: 2024-07-02 | Stop reason: SDUPTHER

## 2024-07-02 RX ADMIN — SODIUM CHLORIDE: 9 INJECTION, SOLUTION INTRAVENOUS at 18:08

## 2024-07-02 RX ADMIN — FENTANYL CITRATE 50 MCG: 50 INJECTION, SOLUTION INTRAMUSCULAR; INTRAVENOUS at 14:34

## 2024-07-02 RX ADMIN — SODIUM CHLORIDE, SODIUM LACTATE, POTASSIUM CHLORIDE, AND CALCIUM CHLORIDE: 600; 310; 30; 20 INJECTION, SOLUTION INTRAVENOUS at 11:40

## 2024-07-02 RX ADMIN — ACETAMINOPHEN 1000 MG: 500 TABLET, FILM COATED ORAL at 11:39

## 2024-07-02 RX ADMIN — LOSARTAN POTASSIUM 50 MG: 50 TABLET, FILM COATED ORAL at 21:02

## 2024-07-02 RX ADMIN — LIDOCAINE HYDROCHLORIDE 70 MG: 20 INJECTION, SOLUTION EPIDURAL; INFILTRATION; INTRACAUDAL; PERINEURAL at 14:06

## 2024-07-02 RX ADMIN — ONDANSETRON 4 MG: 2 INJECTION INTRAMUSCULAR; INTRAVENOUS at 14:49

## 2024-07-02 RX ADMIN — EPHEDRINE SULFATE 10 MG: 5 INJECTION INTRAVENOUS at 14:57

## 2024-07-02 RX ADMIN — DEXAMETHASONE SODIUM PHOSPHATE 4 MG: 4 INJECTION, SOLUTION INTRAMUSCULAR; INTRAVENOUS at 14:49

## 2024-07-02 RX ADMIN — PROPOFOL 200 MG: 10 INJECTION, EMULSION INTRAVENOUS at 14:06

## 2024-07-02 RX ADMIN — DOCUSATE SODIUM 50 MG AND SENNOSIDES 8.6 MG 1 TABLET: 8.6; 5 TABLET, FILM COATED ORAL at 21:05

## 2024-07-02 RX ADMIN — OXYCODONE HYDROCHLORIDE AND ACETAMINOPHEN 1 TABLET: 5; 325 TABLET ORAL at 21:04

## 2024-07-02 RX ADMIN — EPHEDRINE SULFATE 5 MG: 5 INJECTION INTRAVENOUS at 14:20

## 2024-07-02 RX ADMIN — MORPHINE SULFATE 2 MG: 4 INJECTION INTRAVENOUS at 17:31

## 2024-07-02 RX ADMIN — ROCURONIUM BROMIDE 10 MG: 10 INJECTION, SOLUTION INTRAVENOUS at 14:23

## 2024-07-02 RX ADMIN — EPHEDRINE SULFATE 10 MG: 5 INJECTION INTRAVENOUS at 15:19

## 2024-07-02 RX ADMIN — EPHEDRINE SULFATE 5 MG: 5 INJECTION INTRAVENOUS at 15:00

## 2024-07-02 RX ADMIN — Medication 3 MG: at 15:24

## 2024-07-02 RX ADMIN — Medication 2000 MG: at 14:13

## 2024-07-02 RX ADMIN — TAMSULOSIN HYDROCHLORIDE 0.4 MG: 0.4 CAPSULE ORAL at 21:16

## 2024-07-02 RX ADMIN — SODIUM CHLORIDE, SODIUM LACTATE, POTASSIUM CHLORIDE, AND CALCIUM CHLORIDE: 600; 310; 30; 20 INJECTION, SOLUTION INTRAVENOUS at 14:01

## 2024-07-02 RX ADMIN — FENTANYL CITRATE 50 MCG: 50 INJECTION, SOLUTION INTRAMUSCULAR; INTRAVENOUS at 14:06

## 2024-07-02 RX ADMIN — EPHEDRINE SULFATE 10 MG: 5 INJECTION INTRAVENOUS at 15:21

## 2024-07-02 RX ADMIN — OXCARBAZEPINE 300 MG: 300 TABLET, FILM COATED ORAL at 21:02

## 2024-07-02 RX ADMIN — CEFAZOLIN 2000 MG: 10 INJECTION, POWDER, FOR SOLUTION INTRAVENOUS at 22:36

## 2024-07-02 RX ADMIN — GLYCOPYRROLATE 0.4 MG: 0.2 INJECTION INTRAMUSCULAR; INTRAVENOUS at 15:24

## 2024-07-02 RX ADMIN — ROCURONIUM BROMIDE 30 MG: 10 INJECTION, SOLUTION INTRAVENOUS at 14:06

## 2024-07-02 RX ADMIN — EPHEDRINE SULFATE 5 MG: 5 INJECTION INTRAVENOUS at 15:03

## 2024-07-02 ASSESSMENT — PAIN DESCRIPTION - DESCRIPTORS
DESCRIPTORS: ACHING;SPASM;BURNING
DESCRIPTORS: ACHING

## 2024-07-02 ASSESSMENT — PAIN DESCRIPTION - LOCATION: LOCATION: PENIS

## 2024-07-02 ASSESSMENT — PAIN - FUNCTIONAL ASSESSMENT
PAIN_FUNCTIONAL_ASSESSMENT: 0-10
PAIN_FUNCTIONAL_ASSESSMENT: PREVENTS OR INTERFERES SOME ACTIVE ACTIVITIES AND ADLS

## 2024-07-02 ASSESSMENT — PAIN SCALES - GENERAL
PAINLEVEL_OUTOF10: 4
PAINLEVEL_OUTOF10: 7
PAINLEVEL_OUTOF10: 3

## 2024-07-02 ASSESSMENT — PAIN DESCRIPTION - ORIENTATION: ORIENTATION: MID

## 2024-07-02 ASSESSMENT — ENCOUNTER SYMPTOMS: SHORTNESS OF BREATH: 1

## 2024-07-02 NOTE — BRIEF OP NOTE
Brief Postoperative Note      Patient: Nicholas Mcdonald  YOB: 1950  MRN: [id number]    Date of Procedure: 7/2/2024    Pre-Op Diagnosis Codes:     * Enlarged prostate with lower urinary tract symptoms (LUTS) [N40.1]    Post-Op Diagnosis: Same       Procedure(s):  CYSTOSCOPY, BIOPOLAR TRANSURETHRAL RESECTION PROSTATE    Surgeon(s):  Magan Molina MD    Assistant:  * No surgical staff found *    Anesthesia: General    Estimated Blood Loss (mL): Minimal    Complications: None    Specimens:   ID Type Source Tests Collected by Time Destination   A : prostate chips Tissue Prostate SURGICAL PATHOLOGY Magan Molina MD 7/2/2024 1512        Implants:  * No implants in log *      Drains:   Urinary Catheter 07/02/24 3 Way (Active)       Findings:  Infection Present At Time Of Surgery (PATOS) (choose all levels that have infection present):  No infection present  Other Findings: Trilobe BPH causing complete visual obstruction of bladder outlet.  Uos close to median lobe    Electronically signed by Magan Molina MD on 7/2/2024 at 3:33 PM

## 2024-07-02 NOTE — PROGRESS NOTES
TRANSFER - IN REPORT:    Verbal report received from AIYANA Stover on Nicholas [facility]  being received from PACU for routine post-op      Report consisted of patient's Situation, Background, Assessment and   Recommendations(SBAR).     Information from the following report(s) Nurse Handoff Report was reviewed with the receiving nurse.    Opportunity for questions and clarification was provided.      Assessment completed upon patient's arrival to unit and care assumed.

## 2024-07-02 NOTE — PERIOP NOTE
TRANSFER - OUT REPORT:    Verbal report given to Primary RN on Nicholas Mcdonald  being transferred to [facility] for routine post-op       Report consisted of patient’s Situation, Background, Assessment and   Recommendations(SBAR).     Information from the following report(s) Nurse Handoff Report, Adult Overview, Surgery Report, MAR, Cardiac Rhythm SR, and Neuro Assessment was reviewed with the receiving nurse.    Lines:   Peripheral IV 07/02/24 Distal;Right Cephalic (Active)   Site Assessment Clean, dry & intact 07/02/24 1609   Line Status Infusing 07/02/24 1609   Line Care Connections checked and tightened 07/02/24 1609   Phlebitis Assessment No symptoms 07/02/24 1609   Infiltration Assessment 0 07/02/24 1609   Alcohol Cap Used No 07/02/24 1135   Dressing Status Clean, dry & intact 07/02/24 1609   Dressing Type Transparent 07/02/24 1609   Dressing Intervention New 07/02/24 1135        Opportunity for questions and clarification was provided.      Patient transported with:   O2 @ 2 liters  Tech    VTE prophylaxis orders have been written for Nicholas Mcdonald.    Patient and family given floor number and nurses name.  Family updated re: pt status after security code verified.

## 2024-07-02 NOTE — ANESTHESIA POSTPROCEDURE EVALUATION
Department of Anesthesiology  Postprocedure Note    Patient: Nicholas Mcdonald  MRN: [id number]  YOB: 1950  Date of evaluation: 7/2/2024    Procedure Summary       Date: 07/02/24 Room / Location: [facility] MAIN OR 02 / [facility] MAIN OR    Anesthesia Start: 1357 Anesthesia Stop: 1543    Procedure: CYSTOSCOPY, BIOPOLAR TRANSURETHRAL RESECTION PROSTATE (Bladder) Diagnosis:       Enlarged prostate with lower urinary tract symptoms (LUTS)      (Enlarged prostate with lower urinary tract symptoms (LUTS) [N40.1])    Providers: Magan Molina MD Responsible Provider: Saida Klein MD    Anesthesia Type: general ASA Status: 4            Anesthesia Type: No value filed.    Kristina Phase I: Kristina Score: 10    Kristina Phase II:      Anesthesia Post Evaluation    Patient location during evaluation: PACU  Patient participation: complete - patient participated  Level of consciousness: awake and alert  Airway patency: patent  Nausea & Vomiting: no nausea and no vomiting  Cardiovascular status: hemodynamically stable  Respiratory status: acceptable, nonlabored ventilation and spontaneous ventilation  Hydration status: euvolemic  Comments: /64   Pulse 86   Temp 98 °F (36.7 °C) (Temporal)   Resp 14   Ht 1.676 m (5' 6\")   Wt 82.1 kg (181 lb)   SpO2 97%   BMI 29.21 kg/m²     Multimodal analgesia pain management approach  Pain management: adequate and satisfactory to patient    No notable events documented.

## 2024-07-02 NOTE — PROGRESS NOTES
Hourly rounds performed this shift. Bed lowered and locked. Call light within reach. All needs met at this time. CBI draining at a slow rate. Urine is clear and pink. Thus far, pt has 8000 output with 6000 input.  Pain report at the tip of the penis. PRN meds administered with relief noted. Pt ambulated one lap on the floor with staff and tolerated it well. BS are hypoactive. Pt is on a regular diet but informed to take input slow until bowels are more active. Wife is at the bedside. IV fluids infusing. Bedside shift report will be given to oncoming nurse.

## 2024-07-02 NOTE — H&P
[facility] H&P Note                                           07/02/24     Patient: Nicholas Mcdonald  MRN: [id number]    Admission Date:  7/2/2024, 0  Admission Diagnosis: Enlarged prostate with lower urinary tract symptoms (LUTS) [N40.1]  Reason for Consult: BPH/LUTS    ASSESSMENT: 73 y.o. male with symptomatic BPH/LUTS who presents for TURP today.  LUTS are refractory to flomax + finasteride.     PLAN:  -To OR for cystoscopy, bipolar transurethral resection of prostate today  -Consented  -Antibiotic on call to OR  -NPO for procedure.       __________________________________________________________________________________    HPI:     Nicholas Mcdonald is a 73 y.o. male with symptomatic BPH/LUTS who presents for TURP today.  LUTS are refractory to flomax + finasteride.     No changes in medical history since last seen by me.  NPO for procedure.     Past Medical History:  Past Medical History:   Diagnosis Date    Arthritis     BPH (benign prostatic hyperplasia)     Cancer (HCC)     skin cancer basal cell    Chronic fatigue     Conversion disorder with seizures or convulsions     Elevated PSA     History of PFTs 09/06/2022    History of transesophageal echocardiography (ZURDO) 09/02/2022    EF of 50 - 55%; Moderate regurgitation mitral & tricuspid; severe aortic stenosis    Hypertension     Implantable loop recorder present     Low back pain     Major depressive disorder     Neck injury 01/25/2022    fractured C2; reports neck brace and no surgery    Seizures (HCC) 1995    Sleep apnea     uses cpap hs    Umbilical hernia        Past Surgical History:  Past Surgical History:   Procedure Laterality Date    ABDOMEN SURGERY      repaired chain saw laceration    CARDIAC PROCEDURE N/A 09/02/2022    Left heart cath / coronary angiography performed by Godwin Orta MD at [facility] CARDIAC CATH LAB    CARDIAC PROCEDURE N/A 09/27/2022    Transcatheter aortic valve replacement

## 2024-07-02 NOTE — PROGRESS NOTES
4 Eyes Skin Assessment     NAME:  Nicholas Mcdonald  YOB: 1950  MEDICAL RECORD NUMBER:  [id number]    The patient is being assessed for  Admission    I agree that at least one RN has performed a thorough Head to Toe Skin Assessment on the patient. ALL assessment sites listed below have been assessed.      Areas assessed by both nurses:    Head, Face, Ears, Shoulders, Back, Chest, Arms, Elbows, Hands, Sacrum. Buttock, Coccyx, Ischium, Legs. Feet and Heels, and Under Medical Devices         Does the Patient have a Wound? No noted wound(s)       Vignesh Prevention initiated by RN: Yes  Wound Care Orders initiated by RN: No    Pressure Injury (Stage 3,4, Unstageable, DTI, NWPT, and Complex wounds) if present, place Wound referral order by RN under : No    New Ostomies, if present place, Ostomy referral order under : No     Nurse 1 eSignature: Electronically signed by Jenny Jorgensen RN on 7/2/24 at 6:31 PM EDT    **SHARE this note so that the co-signing nurse can place an eSignature**    Nurse 2 eSignature: Electronically signed by Kylah Estrada RN on 7/2/24 at 7:55 PM EDT

## 2024-07-02 NOTE — OP NOTE
[address]  [address]                            OPERATIVE REPORT      PATIENT NAME: MADINA KATZ        : 1950  MED REC NO: [id number]                       ROOM: [facility]  ACCOUNT NO: [id number]                       ADMIT DATE: 2024  PROVIDER: Magan Molina MD    DATE OF SERVICE:  2024    PREOPERATIVE DIAGNOSES:  BPH with lower urinary tract symptoms.    POSTOPERATIVE DIAGNOSES:  BPH with lower urinary tract symptoms.    PROCEDURES PERFORMED:  Cystoscopy, bipolar transurethral resection of prostate.    SURGEON:  Magan Molina MD    ASSISTANT:  None.    ANESTHESIA:  General.    ESTIMATED BLOOD LOSS:  Minimal.    SPECIMENS REMOVED:  Prostate chips for path.    INTRAOPERATIVE FINDINGS:  Known infection.  Trilobar BPH causing complete visual obstruction of the bladder outlet.  UOs close to the median lobe.     COMPLICATIONS:  None.    IMPLANTS:  None.    INDICATIONS:  The patient is a pleasant 73-year-old gentleman, who was referred to me by Dr. Steven for symptomatic BPH with LUTS.  He is on Flomax and finasteride, but still has urgency, frequency, weak stream, hesitancy, incomplete emptying despite the medications.  He is very frustrated with these symptoms and wanted to proceed with a bipolar TURP today after risk-benefit discussion was had.    DESCRIPTION OF PROCEDURE:  After informed consent was obtained, the correct patient was identified in the preoperative holding area.  He was taken back to the operating room suite and placed on the table in supine position.  Time-out was performed confirming correct patient and planned procedure.  He received 2 g IV Ancef prior to the smooth induction of general endotracheal anesthesia.  He was moved into the dorsal lithotomy position, prepped and draped in the usual sterile fashion.  I began the case by inserting a 26-Vietnamese bipolar resectoscope using the visual

## 2024-07-02 NOTE — ANESTHESIA PRE PROCEDURE
Department of Anesthesiology  Preprocedure Note       Name:  Nicholas Mcdonald   Age:  73 y.o.  :  1950                                          MRN:  [id number]         Date:  2024      Surgeon: Surgeon(s):  Magan Molina MD    Procedure: Procedure(s):  CYSTOSCOPY, BIOPOLAR TRANSURETHRAL RESECTION PROSTATE    Medications prior to admission:   Prior to Admission medications    Medication Sig Start Date End Date Taking? Authorizing Provider   Multiple Vitamins-Minerals (THERAPEUTIC MULTIVITAMIN-MINERALS) tablet Take 1 tablet by mouth daily    Jessi So MD   folic acid (FOLVITE) 1 MG tablet Take 1 tablet by mouth daily 12   Jessi So MD   Cholecalciferol (VITAMIN D3) 1.25 MG (17417 UT) CAPS Take by mouth    Jessi So MD   aspirin EC 81 MG EC tablet Take 1 tablet by mouth daily Daily in morning per patient 23   Fredy Zurita DO   amLODIPine (NORVASC) 10 MG tablet Take 1 tablet by mouth daily    Jessi So MD   losartan (COZAAR) 50 MG tablet Take 1 tablet by mouth nightly    Jessi So MD   finasteride (PROSCAR) 5 MG tablet Take 1 tablet by mouth daily    Jessi So MD   tamsulosin (FLOMAX) 0.4 MG capsule Take 1 capsule by mouth in the morning and at bedtime Indications: evening dose at 6 pm    Jessi So MD   venlafaxine (EFFEXOR XR) 150 MG extended release capsule Take 1 capsule by mouth daily    Jessi So MD   donepezil (ARICEPT) 10 MG tablet Take 0.5 tablets by mouth every morning    Jessi So MD   OXcarbazepine (TRILEPTAL) 300 MG tablet Take 1 tablet by mouth 2 times daily    Jessi So MD       Current medications:    Current Facility-Administered Medications   Medication Dose Route Frequency Provider Last Rate Last Admin   • acetaminophen (TYLENOL) tablet 1,000 mg  1,000 mg Oral Once Artur Johnson MD       • fentaNYL (SUBLIMAZE) injection 100 mcg  100 mcg

## 2024-07-03 LAB
ANION GAP SERPL CALC-SCNC: 7 MMOL/L (ref 9–18)
BASOPHILS # BLD: 0 K/UL (ref 0–0.2)
BASOPHILS NFR BLD: 0 % (ref 0–2)
BUN SERPL-MCNC: 9 MG/DL (ref 8–23)
CALCIUM SERPL-MCNC: 8.9 MG/DL (ref 8.8–10.2)
CHLORIDE SERPL-SCNC: 104 MMOL/L (ref 98–107)
CO2 SERPL-SCNC: 28 MMOL/L (ref 20–28)
CREAT SERPL-MCNC: 0.74 MG/DL (ref 0.8–1.3)
DIFFERENTIAL METHOD BLD: ABNORMAL
EOSINOPHIL # BLD: 0 K/UL (ref 0–0.8)
EOSINOPHIL NFR BLD: 0 % (ref 0.5–7.8)
ERYTHROCYTE [DISTWIDTH] IN BLOOD BY AUTOMATED COUNT: 13.1 % (ref 11.9–14.6)
GLUCOSE SERPL-MCNC: 131 MG/DL (ref 70–99)
HCT VFR BLD AUTO: 40.2 % (ref 41.1–50.3)
HGB BLD-MCNC: 13.2 G/DL (ref 13.6–17.2)
IMM GRANULOCYTES # BLD AUTO: 0 K/UL (ref 0–0.5)
IMM GRANULOCYTES NFR BLD AUTO: 0 % (ref 0–5)
LYMPHOCYTES # BLD: 0.9 K/UL (ref 0.5–4.6)
LYMPHOCYTES NFR BLD: 8 % (ref 13–44)
MCH RBC QN AUTO: 33 PG (ref 26.1–32.9)
MCHC RBC AUTO-ENTMCNC: 32.8 G/DL (ref 31.4–35)
MCV RBC AUTO: 100.5 FL (ref 82–102)
MONOCYTES # BLD: 1 K/UL (ref 0.1–1.3)
MONOCYTES NFR BLD: 9 % (ref 4–12)
NEUTS SEG # BLD: 8.8 K/UL (ref 1.7–8.2)
NEUTS SEG NFR BLD: 83 % (ref 43–78)
NRBC # BLD: 0 K/UL (ref 0–0.2)
PLATELET # BLD AUTO: 249 K/UL (ref 150–450)
PMV BLD AUTO: 8.7 FL (ref 9.4–12.3)
POTASSIUM SERPL-SCNC: 4.3 MMOL/L (ref 3.5–5.1)
RBC # BLD AUTO: 4 M/UL (ref 4.23–5.6)
SODIUM SERPL-SCNC: 139 MMOL/L (ref 136–145)
WBC # BLD AUTO: 10.7 K/UL (ref 4.3–11.1)

## 2024-07-03 PROCEDURE — 2580000003 HC RX 258: Performed by: UROLOGY

## 2024-07-03 PROCEDURE — 36415 COLL VENOUS BLD VENIPUNCTURE: CPT

## 2024-07-03 PROCEDURE — 6360000002 HC RX W HCPCS: Performed by: UROLOGY

## 2024-07-03 PROCEDURE — 51700 IRRIGATION OF BLADDER: CPT

## 2024-07-03 PROCEDURE — G0378 HOSPITAL OBSERVATION PER HR: HCPCS

## 2024-07-03 PROCEDURE — 80048 BASIC METABOLIC PNL TOTAL CA: CPT

## 2024-07-03 PROCEDURE — 6370000000 HC RX 637 (ALT 250 FOR IP): Performed by: UROLOGY

## 2024-07-03 PROCEDURE — 85025 COMPLETE CBC W/AUTO DIFF WBC: CPT

## 2024-07-03 PROCEDURE — 99024 POSTOP FOLLOW-UP VISIT: CPT | Performed by: PHYSICIAN ASSISTANT

## 2024-07-03 RX ADMIN — SODIUM CHLORIDE: 9 INJECTION, SOLUTION INTRAVENOUS at 07:40

## 2024-07-03 RX ADMIN — TAMSULOSIN HYDROCHLORIDE 0.4 MG: 0.4 CAPSULE ORAL at 08:37

## 2024-07-03 RX ADMIN — TAMSULOSIN HYDROCHLORIDE 0.4 MG: 0.4 CAPSULE ORAL at 21:49

## 2024-07-03 RX ADMIN — SODIUM CHLORIDE, PRESERVATIVE FREE 10 ML: 5 INJECTION INTRAVENOUS at 08:49

## 2024-07-03 RX ADMIN — FINASTERIDE 5 MG: 5 TABLET, FILM COATED ORAL at 08:39

## 2024-07-03 RX ADMIN — DOCUSATE SODIUM 50 MG AND SENNOSIDES 8.6 MG 1 TABLET: 8.6; 5 TABLET, FILM COATED ORAL at 21:49

## 2024-07-03 RX ADMIN — DOCUSATE SODIUM 50 MG AND SENNOSIDES 8.6 MG 1 TABLET: 8.6; 5 TABLET, FILM COATED ORAL at 08:37

## 2024-07-03 RX ADMIN — LOSARTAN POTASSIUM 50 MG: 50 TABLET, FILM COATED ORAL at 21:49

## 2024-07-03 RX ADMIN — CEFAZOLIN 2000 MG: 10 INJECTION, POWDER, FOR SOLUTION INTRAVENOUS at 21:50

## 2024-07-03 RX ADMIN — OXYCODONE HYDROCHLORIDE AND ACETAMINOPHEN 1 TABLET: 5; 325 TABLET ORAL at 21:53

## 2024-07-03 RX ADMIN — OXCARBAZEPINE 300 MG: 300 TABLET, FILM COATED ORAL at 21:49

## 2024-07-03 RX ADMIN — SODIUM CHLORIDE: 9 INJECTION, SOLUTION INTRAVENOUS at 21:50

## 2024-07-03 RX ADMIN — CEFAZOLIN 2000 MG: 10 INJECTION, POWDER, FOR SOLUTION INTRAVENOUS at 15:41

## 2024-07-03 RX ADMIN — OXYCODONE HYDROCHLORIDE AND ACETAMINOPHEN 1 TABLET: 5; 325 TABLET ORAL at 08:47

## 2024-07-03 RX ADMIN — CEFAZOLIN 2000 MG: 10 INJECTION, POWDER, FOR SOLUTION INTRAVENOUS at 06:34

## 2024-07-03 RX ADMIN — AMLODIPINE BESYLATE 10 MG: 10 TABLET ORAL at 08:37

## 2024-07-03 RX ADMIN — VENLAFAXINE HYDROCHLORIDE 150 MG: 150 CAPSULE, EXTENDED RELEASE ORAL at 08:39

## 2024-07-03 RX ADMIN — DONEPEZIL HYDROCHLORIDE 5 MG: 5 TABLET, FILM COATED ORAL at 08:37

## 2024-07-03 RX ADMIN — OXCARBAZEPINE 300 MG: 300 TABLET, FILM COATED ORAL at 08:39

## 2024-07-03 RX ADMIN — SODIUM CHLORIDE, PRESERVATIVE FREE 10 ML: 5 INJECTION INTRAVENOUS at 08:40

## 2024-07-03 ASSESSMENT — PAIN DESCRIPTION - DESCRIPTORS
DESCRIPTORS: BURNING
DESCRIPTORS: DISCOMFORT

## 2024-07-03 ASSESSMENT — PAIN SCALES - GENERAL
PAINLEVEL_OUTOF10: 0
PAINLEVEL_OUTOF10: 0
PAINLEVEL_OUTOF10: 6
PAINLEVEL_OUTOF10: 6

## 2024-07-03 ASSESSMENT — PAIN DESCRIPTION - LOCATION
LOCATION: ABDOMEN;GROIN
LOCATION: OTHER (COMMENT)

## 2024-07-03 NOTE — PROGRESS NOTES
Admit Date: 7/2/2024    Subjective:     Patient has no new complaints.     Objective:     Patient Vitals for the past 8 hrs:   BP   07/03/24 0837 139/73     07/03 0701 - 07/03 1900  In: -   Out: 2200 [Urine:2200]  07/01 1901 - 07/03 0700  In: 600 [I.V.:600]  Out: 3320 [Urine:3300]    Physical Exam:  GENERAL ASSESSMENT: alert, oriented to person, place and time, no acute distress and no anxiety, depression or agitation  Chest: Easy work of breathing  CVS exam: RRR  ABDOMEN: not done  Neurological exam reveals alert, oriented, normal speech, no focal findings or movement disorder noted.  FEMALE GENITOURINARY EXAM: not done  MALE GENITAL EXAM: Urine cherry colored with CBI paused        Data Review   Recent Results (from the past 24 hour(s))   Basic Metabolic Panel w/ Reflex to MG    Collection Time: 07/03/24  5:48 AM   Result Value Ref Range    Sodium 139 136 - 145 mmol/L    Potassium 4.3 3.5 - 5.1 mmol/L    Chloride 104 98 - 107 mmol/L    CO2 28 20 - 28 mmol/L    Anion Gap 7 (L) 9 - 18 mmol/L    Glucose 131 (H) 70 - 99 mg/dL    BUN 9 8 - 23 MG/DL    Creatinine 0.74 (L) 0.80 - 1.30 MG/DL    Est, Glom Filt Rate >90 >60 ml/min/1.73m2    Calcium 8.9 8.8 - 10.2 MG/DL   CBC with Auto Differential    Collection Time: 07/03/24  5:48 AM   Result Value Ref Range    WBC 10.7 4.3 - 11.1 K/uL    RBC 4.00 (L) 4.23 - 5.6 M/uL    Hemoglobin 13.2 (L) 13.6 - 17.2 g/dL    Hematocrit 40.2 (L) 41.1 - 50.3 %    .5 82 - 102 FL    MCH 33.0 (H) 26.1 - 32.9 PG    MCHC 32.8 31.4 - 35.0 g/dL    RDW 13.1 11.9 - 14.6 %    Platelets 249 150 - 450 K/uL    MPV 8.7 (L) 9.4 - 12.3 FL    nRBC 0.00 0.0 - 0.2 K/uL    Differential Type AUTOMATED      Neutrophils % 83 (H) 43 - 78 %    Lymphocytes % 8 (L) 13 - 44 %    Monocytes % 9 4.0 - 12.0 %    Eosinophils % 0 (L) 0.5 - 7.8 %    Basophils % 0 0.0 - 2.0 %    Immature Granulocytes % 0 0.0 - 5.0 %    Neutrophils Absolute 8.8 (H) 1.7 - 8.2 K/UL    Lymphocytes Absolute 0.9 0.5 - 4.6 K/UL    Monocytes  Absolute 1.0 0.1 - 1.3 K/UL    Eosinophils Absolute 0.0 0.0 - 0.8 K/UL    Basophils Absolute 0.0 0.0 - 0.2 K/UL    Immature Granulocytes Absolute 0.0 0.0 - 0.5 K/UL       No results found.      Assessment:     Principal Problem:    Enlarged prostate with lower urinary tract symptoms (LUTS)  Active Problems:    BPH with obstruction/lower urinary tract symptoms  Resolved Problems:    * No resolved hospital problems. *    S/P TURP POD1. Urine still hematuric  Plan:   Cont to wean CBI.  Ambulate.  IS.  Leal out for VT tomorrow AM.        CHEPE Valenzuela  [facility]    Phone: [phone]    I have reviewed the above note.  I agree with the HPI, exam, assessment and plan as outlined by the nurse practitioner.    Magan Molina M.D.    [facility] [place] [address]  Phone: [phone]  Fax: [phone]

## 2024-07-03 NOTE — PROGRESS NOTES
0749  Major concern with patient is allergy to Aspartame - causes seizures.  Urine is cherry red, but may be due to patient ambulating.  CBI is at moderate rate. Pt is having bladder spasms, but cannot take Levsin due to it containing Aspartame.

## 2024-07-03 NOTE — CARE COORDINATION
CM met with Mr. cMdonald and his wife in room 611 to discuss discharge planning.  He is POD #1 TURP for LUTS.      Prior to this procedure, he was independent with ADLs.  At discharge, plan is home, no additional discharge need voiced or identified.      07/03/24 0812   Service Assessment   Patient Orientation Alert and Oriented   Cognition Alert   History Provided By Patient   Primary Caregiver Self   Accompanied By/Relationship wife Jessica   Support Systems Spouse/Significant Other   PCP Verified by CM Yes   Prior Functional Level Independent in ADLs/IADLs   Current Functional Level Other (see comment)  (routine post-op activity limitations)   Can patient return to prior living arrangement Yes   Ability to make needs known: Good   Family able to assist with home care needs: No   Condition of Participation: Discharge Planning   The Plan for Transition of Care is related to the following treatment goals: home when stable

## 2024-07-03 NOTE — PROGRESS NOTES
Patient c/o bladder spasm like pain. Discussed Levsin as a possible intervention.  Though, d/t patient allergy to aspartame (and, after discussion c/ pharmacy) this was not given to patient.  Percocet was administered for pain and has relieved patient (somewhat) from the spasm discomfort.    Consideration to alternative Rx for spasms could be considered, as patient would likely benefit.    CBI was sped up to moderate rate, as patient's output was more cherry in color. Desirous of \"pink\", as before.  1st bag of this shift: 3000mL \"in\" and 3300mL \"out\"    EOS  2nd bag of overnight= 3000mL \"in\" and 3500mL \"out\". Cherry color continues. Rate sped up to b/t moderate and fast.    Patient is no longer c/o pain. Anxious to go home. Eating bfast and comfortable.    Pt is resting comfortably in bed without complaints. Hourly rounds completed and all needs are met. Bed is locked, low and call light is within reach and patient is encouraged to call for any need(s).

## 2024-07-03 NOTE — PROGRESS NOTES
Patient in bed, wife at bedside, alert and oriented x 4. CBI infusing, urine in bag bloody  to pink. CBI paused by urology, possible discontinue if urine remain clear, free of clots. Luz care completed. NS infusing @ 75 cc RLF. Patient reporting 5/10 pain insertion site, describes it as steady burning, current pain medication is working.   12:29 AM   Hourly rounding, CBI infusing at moderate rate, urine bloody to pink. Patient has no needs at this time.   1541  Hourly rounding, CBI infusing, urine red to pink, infusing @ fast rate.

## 2024-07-04 VITALS
RESPIRATION RATE: 16 BRPM | WEIGHT: 181 LBS | OXYGEN SATURATION: 90 % | HEART RATE: 79 BPM | TEMPERATURE: 97.5 F | HEIGHT: 66 IN | DIASTOLIC BLOOD PRESSURE: 84 MMHG | BODY MASS INDEX: 29.09 KG/M2 | SYSTOLIC BLOOD PRESSURE: 152 MMHG

## 2024-07-04 PROCEDURE — 99024 POSTOP FOLLOW-UP VISIT: CPT | Performed by: PHYSICIAN ASSISTANT

## 2024-07-04 PROCEDURE — 6360000002 HC RX W HCPCS: Performed by: UROLOGY

## 2024-07-04 PROCEDURE — G0378 HOSPITAL OBSERVATION PER HR: HCPCS

## 2024-07-04 PROCEDURE — 6370000000 HC RX 637 (ALT 250 FOR IP): Performed by: UROLOGY

## 2024-07-04 PROCEDURE — 51702 INSERT TEMP BLADDER CATH: CPT

## 2024-07-04 PROCEDURE — 2580000003 HC RX 258: Performed by: UROLOGY

## 2024-07-04 RX ADMIN — TAMSULOSIN HYDROCHLORIDE 0.4 MG: 0.4 CAPSULE ORAL at 09:07

## 2024-07-04 RX ADMIN — SODIUM CHLORIDE, PRESERVATIVE FREE 10 ML: 5 INJECTION INTRAVENOUS at 09:08

## 2024-07-04 RX ADMIN — VENLAFAXINE HYDROCHLORIDE 150 MG: 150 CAPSULE, EXTENDED RELEASE ORAL at 09:07

## 2024-07-04 RX ADMIN — DONEPEZIL HYDROCHLORIDE 5 MG: 5 TABLET, FILM COATED ORAL at 09:08

## 2024-07-04 RX ADMIN — FINASTERIDE 5 MG: 5 TABLET, FILM COATED ORAL at 09:08

## 2024-07-04 RX ADMIN — AMLODIPINE BESYLATE 10 MG: 10 TABLET ORAL at 09:07

## 2024-07-04 RX ADMIN — CEFAZOLIN 2000 MG: 10 INJECTION, POWDER, FOR SOLUTION INTRAVENOUS at 06:04

## 2024-07-04 RX ADMIN — OXCARBAZEPINE 300 MG: 300 TABLET, FILM COATED ORAL at 09:07

## 2024-07-04 RX ADMIN — DOCUSATE SODIUM 50 MG AND SENNOSIDES 8.6 MG 1 TABLET: 8.6; 5 TABLET, FILM COATED ORAL at 09:07

## 2024-07-04 ASSESSMENT — PAIN SCALES - GENERAL: PAINLEVEL_OUTOF10: 3

## 2024-07-04 NOTE — PLAN OF CARE
Problem: Pain  Goal: Verbalizes/displays adequate comfort level or baseline comfort level  7/4/2024 1436 by Hayley Wallace RN  Outcome: Adequate for Discharge  7/4/2024 1436 by Hayley Wallace RN  Outcome: Adequate for Discharge  7/4/2024 0406 by Ya Jaeger RN  Outcome: Progressing     Problem: Safety - Adult  Goal: Free from fall injury  7/4/2024 1436 by Hayley Wallace RN  Outcome: Adequate for Discharge  7/4/2024 0406 by Ya Jaeger RN  Outcome: Progressing     Problem: Discharge Planning  Goal: Discharge to home or other facility with appropriate resources  7/4/2024 1436 by Hayley Wallace RN  Outcome: Adequate for Discharge  7/4/2024 0406 by Ya Jaeger RN  Outcome: Progressing     Problem: ABCDS Injury Assessment  Goal: Absence of physical injury  7/4/2024 1436 by Hayley Wallace RN  Outcome: Adequate for Discharge  7/4/2024 0406 by Ya Jaeger RN  Outcome: Progressing

## 2024-07-04 NOTE — PROGRESS NOTES
Admit Date: 7/2/2024    Subjective:     Patient has no new complaints.     Objective:     Patient Vitals for the past 8 hrs:   BP Temp Temp src Pulse Resp SpO2   07/04/24 0728 (!) 152/84 97.5 °F (36.4 °C) Axillary 79 16 90 %   07/04/24 0307 (!) 127/59 98.2 °F (36.8 °C) Oral 81 18 92 %     No intake/output data recorded.  07/02 1901 - 07/04 0700  In: 2635.5 [I.V.:2635.5]  Out: 69278 [Urine:53255]    Physical Exam:  GENERAL ASSESSMENT: alert, oriented to person, place and time, no acute distress and no anxiety, depression or agitation  Chest: Easy work of breathing  CVS exam: RRR  ABDOMEN: not done  Neurological exam reveals alert, oriented, normal speech, no focal findings or movement disorder noted.  FEMALE GENITOURINARY EXAM: not done  MALE GENITAL EXAM: moffett draining pale pink urine with CBI at slow rate        Data Review No results found for this or any previous visit (from the past 24 hour(s)).    No results found.      Assessment:     Principal Problem:    Enlarged prostate with lower urinary tract symptoms (LUTS)  Active Problems:    BPH with obstruction/lower urinary tract symptoms  Resolved Problems:    * No resolved hospital problems. *    S/P TURP POD2  Plan:   Remove moffett for voiding trial.  Ambulate.  IS.   Push PO hydration.  Home today after VT.  Follow up in 2 weeks.      CHEPE Valenzuela  [facility]    Phone: [phone]    I have reviewed the above note.  I agree with the HPI, exam, assessment and plan as outlined by the nurse practitioner.    Magan Molina M.D.    [facility]  [address] [address]  Phone: [phone]  Fax: [phone]

## 2024-07-04 NOTE — PLAN OF CARE
Problem: Safety - Adult  Goal: Free from fall injury  7/4/2024 0406 by Ya Jaeger, RN  Outcome: Progressing     Problem: Discharge Planning  Goal: Discharge to home or other facility with appropriate resources  7/4/2024 0406 by Ya Jaeger, RN  Outcome: Progressing     Problem: ABCDS Injury Assessment  Goal: Absence of physical injury  7/4/2024 0406 by Ya Jaeger, RN  Outcome: Progressing

## 2024-07-04 NOTE — PLAN OF CARE
Problem: Pain  Goal: Verbalizes/displays adequate comfort level or baseline comfort level  7/4/2024 0406 by Ya Jaeger RN  Outcome: Progressing  7/3/2024 1839 by Hayley Wallace RN  Outcome: Progressing     Problem: Safety - Adult  Goal: Free from fall injury  7/4/2024 0406 by Ya Jaeger RN  Outcome: Progressing  7/3/2024 1839 by Hayley Wallace RN  Outcome: Progressing     Problem: Discharge Planning  Goal: Discharge to home or other facility with appropriate resources  7/4/2024 0406 by Ya Jaeger RN  Outcome: Progressing  7/3/2024 1839 by Hayley Wallace RN  Outcome: Progressing     Problem: ABCDS Injury Assessment  Goal: Absence of physical injury  7/4/2024 0406 by Ya Jaeger RN  Outcome: Progressing  7/3/2024 1839 by Hayley Wallace RN  Outcome: Progressing

## 2024-07-04 NOTE — PROGRESS NOTES
Hourly rounds performed this shift. Bed lowered and locked. Call light within reach. PRN pain medication given per MAR, pt satisifed. CBI running slow with light pink output noted. Pt did not have to be irrigated this shift. Bedside report will be given to oncoming nurse.

## 2024-07-04 NOTE — DISCHARGE SUMMARY
1.013       Ref range: 1.001 - 1.023      Status: Final  pH, Urine                                     Date: 07/02/2024  Value: 6.0         Ref range: 5.0 - 9.0          Status: Final  Protein, UA                                   Date: 07/02/2024  Value: Negative    Ref range: NEG mg/dL          Status: Final  Glucose, Ur                                   Date: 07/02/2024  Value: Negative    Ref range: mg/dL              Status: Final  Ketones, Urine                                Date: 07/02/2024  Value: Negative    Ref range: NEG mg/dL          Status: Final  Bilirubin, Urine                              Date: 07/02/2024  Value: Negative    Ref range: NEG                Status: Final  Blood, Urine                                  Date: 07/02/2024  Value: Negative    Ref range: NEG                Status: Final  Urobilinogen, Urine                           Date: 07/02/2024  Value: 0.2         Ref range: 0.2 - 1.0 EU/dL    Status: Final  Nitrite, Urine                                Date: 07/02/2024  Value: Negative    Ref range: NEG                Status: Final  Leukocyte Esterase, Urine                     Date: 07/02/2024  Value: Negative    Ref range: NEG                Status: Final  Crossmatch expiration date                    Date: 07/02/2024  Value: 07/05/2024,2359                       Status: Final  ABO/Rh                                        Date: 07/02/2024  Value: A POSITIVE    Status: Final  Antibody Screen                               Date: 07/02/2024  Value: NEG           Status: Final  Sodium                                        Date: 07/03/2024  Value: 139         Ref range: 136 - 145 mmol/L   Status: Final  Potassium                                     Date: 07/03/2024  Value: 4.3         Ref range: 3.5 - 5.1 mmol/L   Status: Final  Chloride                                      Date: 07/03/2024  Value: 104         Ref range: 98 - 107 mmol/L    Status: Final  CO2                               M/uL    Status: Final  Hemoglobin                                    Date: 07/03/2024  Value: 13.2 (L)    Ref range: 13.6 - 17.2 g/dL   Status: Final  Hematocrit                                    Date: 07/03/2024  Value: 40.2 (L)    Ref range: 41.1 - 50.3 %      Status: Final  MCV                                           Date: 07/03/2024  Value: 100.5       Ref range: 82 - 102 FL        Status: Final  MCH                                           Date: 07/03/2024  Value: 33.0 (H)    Ref range: 26.1 - 32.9 PG     Status: Final  MCHC                                          Date: 07/03/2024  Value: 32.8        Ref range: 31.4 - 35.0 g/dL   Status: Final  RDW                                           Date: 07/03/2024  Value: 13.1        Ref range: 11.9 - 14.6 %      Status: Final  Platelets                                     Date: 07/03/2024  Value: 249         Ref range: 150 - 450 K/uL     Status: Final  MPV                                           Date: 07/03/2024  Value: 8.7 (L)     Ref range: 9.4 - 12.3 FL      Status: Final  nRBC                                          Date: 07/03/2024  Value: 0.00        Ref range: 0.0 - 0.2 K/uL     Status: Final                Comment: **Note: Absolute NRBC parameter is now reported with Hemogram**  Differential Type                             Date: 07/03/2024  Value: AUTOMATED   Ref range:                    Status: Final  Neutrophils %                                 Date: 07/03/2024  Value: 83 (H)      Ref range: 43 - 78 %          Status: Final  Lymphocytes %                                 Date: 07/03/2024  Value: 8 (L)       Ref range: 13 - 44 %          Status: Final  Monocytes %                                   Date: 07/03/2024  Value: 9           Ref range: 4.0 - 12.0 %       Status: Final  Eosinophils %                                 Date: 07/03/2024  Value: 0 (L)       Ref range: 0.5 - 7.8 %        Status: Final  Basophils %

## 2024-07-04 NOTE — PROGRESS NOTES
Patient in bed AOx4, Moffett, patent, urine in bag pink in color. Patient reports 3/10 pain, at moffett insertion site. 22 Gauge left hand, infusing 75 ml/hr. Moffett D/C per orders, 400 cc of urine, pink in color. Night shift nurse reporting, new onset of cough overnight. Lungs sounds, lower bases B/L crackles, this is a change compared to yesterdays assessment. Incentive spirometry given to patient, initial start 2000 ml. All needs met at this time.

## 2024-07-05 ENCOUNTER — CARE COORDINATION (OUTPATIENT)
Dept: CARE COORDINATION | Facility: CLINIC | Age: 74
End: 2024-07-05

## 2024-07-05 NOTE — CARE COORDINATION
Attempted to reach patient for transitions of care follow up. Unable to reach patient.    Outreach Attempts:   HIPAA compliant voicemail left for patient.     Patient: Nicholas Mcdnoald    Patient : 1950   MRN: [id number]    Reason for Admission: Enlarged prostate with lower urinary tract symptoms (LUTS)  Discharge Date: 24  RURS: Readmission Risk Score: 9.8    Last Discharge Facility       Date Complaint Diagnosis Description Type Department Provider    24   Admission (Discharged) [id number] Magan Molina MD            Was this an external facility discharge? No    Follow Up Appointment:   Patient has hospital follow up appointment scheduled within 14 days of discharge.    Future Appointments         Provider Specialty Dept Phone    2024 2:15 PM Nadia Tolbert, APRN - [facility] [phone]    2024 11:30 AM Magan Molina MD Urology [phone]    2024 11:30 AM [facility] Cardiology [phone]    2024 11:30 AM Godwin Orta MD Cardiology [phone]            Plan for follow-up on next business day.      Amairani Pizano RN

## 2024-07-07 ENCOUNTER — APPOINTMENT (OUTPATIENT)
Dept: ULTRASOUND IMAGING | Age: 74
End: 2024-07-07
Payer: OTHER GOVERNMENT

## 2024-07-07 ENCOUNTER — HOSPITAL ENCOUNTER (EMERGENCY)
Age: 74
Discharge: HOME | End: 2024-07-07
Attending: EMERGENCY MEDICINE
Payer: OTHER GOVERNMENT

## 2024-07-07 VITALS
BODY MASS INDEX: 29.7 KG/M2 | WEIGHT: 184 LBS | DIASTOLIC BLOOD PRESSURE: 90 MMHG | OXYGEN SATURATION: 98 % | HEART RATE: 82 BPM | RESPIRATION RATE: 16 BRPM | SYSTOLIC BLOOD PRESSURE: 135 MMHG | TEMPERATURE: 98.4 F

## 2024-07-07 DIAGNOSIS — I82.811 EMBOLISM AND THROMBOSIS OF SUPERFICIAL VEIN OF RIGHT LOWER EXTREMITY: Primary | ICD-10-CM

## 2024-07-07 LAB
ALBUMIN SERPL-MCNC: 3.6 G/DL (ref 3.2–4.6)
ALBUMIN/GLOB SERPL: 1.3 (ref 1–1.9)
ALP SERPL-CCNC: 109 U/L (ref 40–129)
ALT SERPL-CCNC: 21 U/L (ref 12–65)
ANION GAP SERPL CALC-SCNC: 10 MMOL/L (ref 9–18)
AST SERPL-CCNC: 26 U/L (ref 15–37)
BASOPHILS # BLD: 0.1 K/UL (ref 0–0.2)
BASOPHILS NFR BLD: 1 % (ref 0–2)
BILIRUB SERPL-MCNC: <0.2 MG/DL (ref 0–1.2)
BUN SERPL-MCNC: 12 MG/DL (ref 8–23)
CALCIUM SERPL-MCNC: 9.3 MG/DL (ref 8.8–10.2)
CHLORIDE SERPL-SCNC: 100 MMOL/L (ref 98–107)
CO2 SERPL-SCNC: 27 MMOL/L (ref 20–28)
CREAT SERPL-MCNC: 0.81 MG/DL (ref 0.8–1.3)
DIFFERENTIAL METHOD BLD: ABNORMAL
EKG ATRIAL RATE: 77 BPM
EKG DIAGNOSIS: NORMAL
EKG P AXIS: 47 DEGREES
EKG P-R INTERVAL: 160 MS
EKG Q-T INTERVAL: 358 MS
EKG QRS DURATION: 120 MS
EKG QTC CALCULATION (BAZETT): 405 MS
EKG R AXIS: -24 DEGREES
EKG T AXIS: 48 DEGREES
EKG VENTRICULAR RATE: 77 BPM
EOSINOPHIL # BLD: 0.1 K/UL (ref 0–0.8)
EOSINOPHIL NFR BLD: 2 % (ref 0.5–7.8)
ERYTHROCYTE [DISTWIDTH] IN BLOOD BY AUTOMATED COUNT: 13.1 % (ref 11.9–14.6)
GLOBULIN SER CALC-MCNC: 2.8 G/DL (ref 2.3–3.5)
GLUCOSE SERPL-MCNC: 95 MG/DL (ref 70–99)
HCT VFR BLD AUTO: 42 % (ref 41.1–50.3)
HGB BLD-MCNC: 13.8 G/DL (ref 13.6–17.2)
IMM GRANULOCYTES # BLD AUTO: 0 K/UL (ref 0–0.5)
IMM GRANULOCYTES NFR BLD AUTO: 1 % (ref 0–5)
LYMPHOCYTES # BLD: 1.4 K/UL (ref 0.5–4.6)
LYMPHOCYTES NFR BLD: 21 % (ref 13–44)
MCH RBC QN AUTO: 32.5 PG (ref 26.1–32.9)
MCHC RBC AUTO-ENTMCNC: 32.9 G/DL (ref 31.4–35)
MCV RBC AUTO: 98.8 FL (ref 82–102)
MONOCYTES # BLD: 0.8 K/UL (ref 0.1–1.3)
MONOCYTES NFR BLD: 12 % (ref 4–12)
NEUTS SEG # BLD: 4.4 K/UL (ref 1.7–8.2)
NEUTS SEG NFR BLD: 63 % (ref 43–78)
NRBC # BLD: 0 K/UL (ref 0–0.2)
NT PRO BNP: 265 PG/ML (ref 0–450)
PLATELET # BLD AUTO: 284 K/UL (ref 150–450)
PMV BLD AUTO: 8.8 FL (ref 9.4–12.3)
POTASSIUM SERPL-SCNC: 4.8 MMOL/L (ref 3.5–5.1)
PROT SERPL-MCNC: 6.4 G/DL (ref 6.3–8.2)
RBC # BLD AUTO: 4.25 M/UL (ref 4.23–5.6)
SODIUM SERPL-SCNC: 137 MMOL/L (ref 136–145)
WBC # BLD AUTO: 6.9 K/UL (ref 4.3–11.1)

## 2024-07-07 PROCEDURE — 93971 EXTREMITY STUDY: CPT

## 2024-07-07 PROCEDURE — 99284 EMERGENCY DEPT VISIT MOD MDM: CPT

## 2024-07-07 PROCEDURE — 80053 COMPREHEN METABOLIC PANEL: CPT

## 2024-07-07 PROCEDURE — 85025 COMPLETE CBC W/AUTO DIFF WBC: CPT

## 2024-07-07 PROCEDURE — 83880 ASSAY OF NATRIURETIC PEPTIDE: CPT

## 2024-07-07 PROCEDURE — 93005 ELECTROCARDIOGRAM TRACING: CPT | Performed by: EMERGENCY MEDICINE

## 2024-07-07 ASSESSMENT — PAIN SCALES - GENERAL: PAINLEVEL_OUTOF10: 3

## 2024-07-07 NOTE — ED NOTES
Patient mobility status  with no difficulty. Provider aware     I have reviewed discharge instructions with the patient and spouse.  The patient verbalized understanding.    Patient left ED via Discharge Method: wheelchair to Home with Spouse.    Opportunity for questions and clarification provided.     Patient given 1 scripts.            Samantha Rodgers RN  07/07/24 3479

## 2024-07-07 NOTE — ED PROVIDER NOTES
appearance. He is well-developed. He is not ill-appearing.   HENT:      Head: Normocephalic and atraumatic.   Eyes:      Conjunctiva/sclera: Conjunctivae normal.   Cardiovascular:      Rate and Rhythm: Normal rate and regular rhythm.   Pulmonary:      Effort: Pulmonary effort is normal.      Breath sounds: Normal breath sounds.   Abdominal:      Palpations: Abdomen is soft.      Tenderness: There is no abdominal tenderness. There is no guarding or rebound.   Musculoskeletal:         General: Tenderness present. Normal range of motion.      Cervical back: Normal range of motion and neck supple.      Right lower leg: Edema (mild) present.      Left lower leg: No edema.        Legs:       Comments: Nl distal pulses, multiple varicose veins   Skin:     General: Skin is warm and dry.      Coloration: Skin is not jaundiced.   Neurological:      General: No focal deficit present.      Mental Status: He is alert and oriented to person, place, and time.      Cranial Nerves: No cranial nerve deficit.   Psychiatric:         Mood and Affect: Mood normal.         Behavior: Behavior normal.        Procedures     Procedures    Orders Placed This Encounter   Procedures    CBC with Auto Differential    Comprehensive Metabolic Panel    Brain Natriuretic Peptide    EKG 12 Lead    Saline lock IV  “IF” in treatment area.    Vascular duplex lower extremity venous right        Medications given during this emergency department visit:  Medications - No data to display    Discharge Medication List as of 7/7/2024  4:06 PM        START taking these medications    Details   apixaban starter pack (ELIQUIS DVT/PE STARTER PACK) 5 MG TBPK tablet Take 1 tablet by mouth See Admin Instructions, Disp-74 tablet, R-0Print              Past Medical History:   Diagnosis Date    Arthritis     BPH (benign prostatic hyperplasia)     Cancer (HCC)     skin cancer basal cell    Chronic fatigue     Conversion disorder with seizures or convulsions     Elevated

## 2024-07-07 NOTE — ED TRIAGE NOTES
Pt arrives via POV from home for complaint of RLE swelling. Pt is s/p TURP on 7/2. Pts leg is slightly swollen, painful to palpation, and warmer than his LLE. Denies SOB, CP. No acute distress.

## 2024-07-08 ENCOUNTER — CARE COORDINATION (OUTPATIENT)
Dept: CARE COORDINATION | Facility: CLINIC | Age: 74
End: 2024-07-08

## 2024-07-08 NOTE — CARE COORDINATION
Attempted to reach patient for transitions of care follow up. Unable to reach patient.    Outreach Attempts:   HIPAA compliant voicemail left for patient.     Patient: Nicholas Mcdonald    Patient : 1950   MRN: [id number]    Reason for Admission: ER visit on 2024-Embolism and thrombosis of superficial vein of right lower extremity; Admitted on 2024-Enlarged prostate with lower urinary tract symptoms (LUTS)   Discharge Date: 24  RURS: Readmission Risk Score: 9.8    Last Discharge Facility       Date Complaint Diagnosis Description Type Department Provider    24 Leg Swelling Embolism and thrombosis of superficial vein of right lower extremity ED (DISCHARGE) SFJade Marcano MD            Was this an external facility discharge? No    Follow Up Appointment:   Patient has hospital follow up appointment scheduled within 14 days of discharge.    Future Appointments         Provider Specialty Dept Phone    2024 2:15 PM Nadia Tolbert, APRN - CNP Urology [phone]    2024 11:30 AM Magan Molina MD Urology [phone]    2024 11:30 AM [facility] Cardiology [phone]    2024 11:30 AM Godwin Orta MD Cardiology [phone]            UTR x 2.      Amairani Pizano RN

## 2024-07-09 ENCOUNTER — TELEPHONE (OUTPATIENT)
Dept: UROLOGY | Age: 74
End: 2024-07-09

## 2024-07-09 NOTE — TELEPHONE ENCOUNTER
Pt's wife called stated TURP was done 07/02/24 and pt ended up going to ER due to swelling in leg. Pt was diagnosed with blood clot and was given a prescription for eliquis. Pt's wife wanted to know if it was ok to start eliquise. Called pt's wife back [facility] go to start eliquise. If pt was to have any trouble with voiding to call and let us know. Also advise they may see blood in pt's urine and could last 6-8 weeks to keep pushing fluids.

## 2024-07-10 ENCOUNTER — CARE COORDINATION (OUTPATIENT)
Dept: CARE COORDINATION | Facility: CLINIC | Age: 74
End: 2024-07-10

## 2024-07-11 ENCOUNTER — HOSPITAL ENCOUNTER (EMERGENCY)
Age: 74
Discharge: HOME | End: 2024-07-11
Attending: EMERGENCY MEDICINE
Payer: OTHER GOVERNMENT

## 2024-07-11 VITALS
SYSTOLIC BLOOD PRESSURE: 124 MMHG | TEMPERATURE: 97.7 F | RESPIRATION RATE: 16 BRPM | OXYGEN SATURATION: 94 % | BODY MASS INDEX: 29.57 KG/M2 | HEART RATE: 67 BPM | HEIGHT: 66 IN | WEIGHT: 184 LBS | DIASTOLIC BLOOD PRESSURE: 58 MMHG

## 2024-07-11 DIAGNOSIS — R31.0 GROSS HEMATURIA: Primary | ICD-10-CM

## 2024-07-11 LAB
ANION GAP SERPL CALC-SCNC: 8 MMOL/L (ref 9–18)
APPEARANCE UR: ABNORMAL
BACTERIA URNS QL MICRO: 0 /HPF
BASOPHILS # BLD: 0.1 K/UL (ref 0–0.2)
BASOPHILS NFR BLD: 1 % (ref 0–2)
BILIRUB UR QL: NEGATIVE
BUN SERPL-MCNC: 11 MG/DL (ref 8–23)
CALCIUM SERPL-MCNC: 9.3 MG/DL (ref 8.8–10.2)
CASTS URNS QL MICRO: 0 /LPF
CHLORIDE SERPL-SCNC: 102 MMOL/L (ref 98–107)
CO2 SERPL-SCNC: 30 MMOL/L (ref 20–28)
COLOR UR: ABNORMAL
CREAT SERPL-MCNC: 0.88 MG/DL (ref 0.8–1.3)
CRYSTALS URNS QL MICRO: 0 /LPF
DIFFERENTIAL METHOD BLD: ABNORMAL
EOSINOPHIL # BLD: 0.2 K/UL (ref 0–0.8)
EOSINOPHIL NFR BLD: 3 % (ref 0.5–7.8)
EPI CELLS #/AREA URNS HPF: 0 /HPF
ERYTHROCYTE [DISTWIDTH] IN BLOOD BY AUTOMATED COUNT: 12.9 % (ref 11.9–14.6)
GLUCOSE SERPL-MCNC: 102 MG/DL (ref 70–99)
GLUCOSE UR STRIP.AUTO-MCNC: NEGATIVE MG/DL
HCT VFR BLD AUTO: 45.2 % (ref 41.1–50.3)
HGB BLD-MCNC: 14.5 G/DL (ref 13.6–17.2)
HGB UR QL STRIP: ABNORMAL
IMM GRANULOCYTES # BLD AUTO: 0 K/UL (ref 0–0.5)
IMM GRANULOCYTES NFR BLD AUTO: 1 % (ref 0–5)
KETONES UR QL STRIP.AUTO: NEGATIVE MG/DL
LEUKOCYTE ESTERASE UR QL STRIP.AUTO: ABNORMAL
LYMPHOCYTES # BLD: 1.5 K/UL (ref 0.5–4.6)
LYMPHOCYTES NFR BLD: 24 % (ref 13–44)
MCH RBC QN AUTO: 32.4 PG (ref 26.1–32.9)
MCHC RBC AUTO-ENTMCNC: 32.1 G/DL (ref 31.4–35)
MCV RBC AUTO: 101.1 FL (ref 82–102)
MONOCYTES # BLD: 0.7 K/UL (ref 0.1–1.3)
MONOCYTES NFR BLD: 11 % (ref 4–12)
MUCOUS THREADS URNS QL MICRO: 0 /LPF
NEUTS SEG # BLD: 3.8 K/UL (ref 1.7–8.2)
NEUTS SEG NFR BLD: 60 % (ref 43–78)
NITRITE UR QL STRIP.AUTO: NEGATIVE
NRBC # BLD: 0 K/UL (ref 0–0.2)
OTHER OBSERVATIONS: NORMAL
PH UR STRIP: 6 (ref 5–9)
PLATELET # BLD AUTO: 307 K/UL (ref 150–450)
PMV BLD AUTO: 8.9 FL (ref 9.4–12.3)
POTASSIUM SERPL-SCNC: 4.6 MMOL/L (ref 3.5–5.1)
PROT UR STRIP-MCNC: 100 MG/DL
RBC # BLD AUTO: 4.47 M/UL (ref 4.23–5.6)
RBC #/AREA URNS HPF: >100 /HPF
SODIUM SERPL-SCNC: 140 MMOL/L (ref 136–145)
SP GR UR REFRACTOMETRY: 1.02 (ref 1–1.02)
UROBILINOGEN UR QL STRIP.AUTO: 0.2 EU/DL (ref 0.2–1)
WBC # BLD AUTO: 6.3 K/UL (ref 4.3–11.1)
WBC URNS QL MICRO: NORMAL /HPF

## 2024-07-11 PROCEDURE — 85025 COMPLETE CBC W/AUTO DIFF WBC: CPT

## 2024-07-11 PROCEDURE — 93298 REM INTERROG DEV EVAL SCRMS: CPT | Performed by: INTERNAL MEDICINE

## 2024-07-11 PROCEDURE — 81001 URINALYSIS AUTO W/SCOPE: CPT

## 2024-07-11 PROCEDURE — 87086 URINE CULTURE/COLONY COUNT: CPT

## 2024-07-11 PROCEDURE — 6370000000 HC RX 637 (ALT 250 FOR IP): Performed by: EMERGENCY MEDICINE

## 2024-07-11 PROCEDURE — 99283 EMERGENCY DEPT VISIT LOW MDM: CPT

## 2024-07-11 PROCEDURE — 80048 BASIC METABOLIC PNL TOTAL CA: CPT

## 2024-07-11 RX ORDER — LIDOCAINE HYDROCHLORIDE 20 MG/ML
JELLY TOPICAL PRN
Status: DISCONTINUED | OUTPATIENT
Start: 2024-07-11 | End: 2024-07-11 | Stop reason: HOSPADM

## 2024-07-11 RX ADMIN — LIDOCAINE HYDROCHLORIDE: 20 JELLY TOPICAL at 11:05

## 2024-07-11 ASSESSMENT — PAIN - FUNCTIONAL ASSESSMENT: PAIN_FUNCTIONAL_ASSESSMENT: NONE - DENIES PAIN

## 2024-07-11 ASSESSMENT — ENCOUNTER SYMPTOMS
SHORTNESS OF BREATH: 0
COUGH: 0

## 2024-07-11 NOTE — DISCHARGE INSTRUCTIONS
Please return with any fevers, lightheadedness, vomiting, difficulty breathing, worsening symptoms, or additional concerns.    Please follow-up with your urologist as planned.

## 2024-07-11 NOTE — ED NOTES
Patient described crampy pain in the right legs with some distended veins.  Ultrasound vein mapping showed severe reflux in right calf tributaries.  She has been using compression stockings and conservative measures with which her symptoms have gotten significantly better.  For now, we will continue conservative measures.   Patient mobility status  with no difficulty. Provider aware     I have reviewed discharge instructions with the patient.  The patient and spouse verbalized understanding.    Patient left ED via Discharge Method: ambulatory to Home with Spouse.    Opportunity for questions and clarification provided.     Patient given 0 scripts.            Samantha Rodgers, RN  07/11/24 1528

## 2024-07-11 NOTE — ED TRIAGE NOTES
Pt ambulatory to ED w/ cc of worsening hematuria since Sunday. Pt had TERP procedure on 7/2/24. Denies dysuria, fever. Pt A&O x 4

## 2024-07-11 NOTE — ED PROVIDER NOTES
Emergency Department Provider Note       PCP: Carmel Chen MD   Age: 73 y.o.   Sex: male     DISPOSITION       No diagnosis found.    Medical Decision Making     I will check his basic blood work.  We will place a three-way catheter and try to irrigate his bladder.  ED Course as of 07/11/24 1501   Thu Jul 11, 2024   1459 His irrigation is no longer pink or bloody.  I discussed with him indications to return to the hospital if he is have any problems urinating or feeling dizzy or lightheaded.  I will discharge him home. [AC]      ED Course User Index  [AC] Hunter Hernandez MD     1 acute complicated illness or injury.  Shared medical decision making was utilized in creating the patients health plan today.    I independently ordered and reviewed each unique test.                     History     73-year-old gentleman presents with concerns about blood in his urine.  He said that he had a TURP procedure about 10 days ago and then a few days after that unfortunate developed a blood clot in his right leg.  He has now been on Eliquis for a little over 3 days.  He says he noticed that last night and this morning he started urinating dark red blood.  He says his urine is still coming out well but it is grossly bloody.  He denies any significant discomfort with urinating.    No fevers or chills and no cough or shortness of breath.    Elements of this note were created using speech recognition software.  As such, errors of speech recognition may be present.        ROS     Review of Systems   Constitutional:  Negative for chills and fever.   Respiratory:  Negative for cough and shortness of breath.    Genitourinary:  Positive for hematuria. Negative for difficulty urinating and dysuria.        Physical Exam     Vitals signs and nursing note reviewed:  Vitals:    07/11/24 0936 07/11/24 0937   BP: 136/66    Pulse: 67    Resp:  16   Temp: 97.7 °F (36.5 °C)    TempSrc: Oral    SpO2: 95%    Weight:  83.5 kg (184 lb)

## 2024-07-12 NOTE — CARE COORDINATION
Unable to reach for DAREN calls.    Attempted on 7/5/2024, returned to ER on 7/7/20224; unable to reach on 7/8/2024. Noted in [facility], [facility] from 7/9/2024; ER visit on 7/11/2024.    Program to be closed with unable to reach reason.

## 2024-07-13 LAB
BACTERIA SPEC CULT: NORMAL
SERVICE CMNT-IMP: NORMAL

## 2024-07-16 ENCOUNTER — TELEPHONE (OUTPATIENT)
Dept: UROLOGY | Age: 74
End: 2024-07-16

## 2024-07-16 ENCOUNTER — OFFICE VISIT (OUTPATIENT)
Dept: UROLOGY | Age: 74
End: 2024-07-16
Payer: OTHER GOVERNMENT

## 2024-07-16 DIAGNOSIS — N13.8 BPH WITH OBSTRUCTION/LOWER URINARY TRACT SYMPTOMS: Primary | ICD-10-CM

## 2024-07-16 DIAGNOSIS — R31.0 GROSS HEMATURIA: ICD-10-CM

## 2024-07-16 DIAGNOSIS — I82.4Y2 ACUTE DEEP VEIN THROMBOSIS (DVT) OF PROXIMAL VEIN OF LEFT LOWER EXTREMITY (HCC): ICD-10-CM

## 2024-07-16 DIAGNOSIS — N40.1 BPH WITH OBSTRUCTION/LOWER URINARY TRACT SYMPTOMS: Primary | ICD-10-CM

## 2024-07-16 LAB
BILIRUBIN, URINE, POC: NORMAL
BLOOD URINE, POC: NORMAL
GLUCOSE URINE, POC: NEGATIVE
KETONES, URINE, POC: NORMAL
LEUKOCYTE ESTERASE, URINE, POC: NORMAL
NITRITE, URINE, POC: NEGATIVE
PH, URINE, POC: 5.5 (ref 4.6–8)
PROTEIN,URINE, POC: 300
SPECIFIC GRAVITY, URINE, POC: 1.02 (ref 1–1.03)
URINALYSIS CLARITY, POC: NORMAL
URINALYSIS COLOR, POC: NORMAL
UROBILINOGEN, POC: NORMAL

## 2024-07-16 PROCEDURE — 81003 URINALYSIS AUTO W/O SCOPE: CPT | Performed by: NURSE PRACTITIONER

## 2024-07-16 PROCEDURE — 99213 OFFICE O/P EST LOW 20 MIN: CPT | Performed by: NURSE PRACTITIONER

## 2024-07-16 PROCEDURE — 1123F ACP DISCUSS/DSCN MKR DOCD: CPT | Performed by: NURSE PRACTITIONER

## 2024-07-16 ASSESSMENT — ENCOUNTER SYMPTOMS
NAUSEA: 0
BACK PAIN: 0

## 2024-07-16 NOTE — PROGRESS NOTES
- 0  Epith - 0      PHYSICAL EXAM    General appearance - well appearing and in no distress  Mental status - alert, oriented to person, place, and time  Neck - supple, no significant adenopathy  Chest/Lung-  Quiet, even and easy respiratory effort without use of accessory muscles  Skin - normal coloration and turgor, no rashes      Assessment and Plan    ICD-10-CM    1. BPH with obstruction/lower urinary tract symptoms  N40.1 AMB POC URINALYSIS DIP STICK AUTO W/O MICRO    N13.8       2. Gross hematuria  R31.0       3. Acute deep vein thrombosis (DVT) of proximal vein of left lower extremity (HCC)  I82.4Y2           BPH/LUTS- cont Flomax 0.4 mg and Proscar 5 mg PO daily. Will work to wean at future visit. Voiding well after TURP.     Gross hematuria- H/H stable. Urine w sign hematuria but no infection. Discussed we need to monitor closely. Likely r/t anticoagulant therapy after TURP. Will contact cardiology to see if he can HOLD the ASA. If hematuria does not improve, advised we need to complete hematuria work up including CT a/p hematuria and cystoscopy in office.    DVT- will contact cardiology to arrange follow up for patient. There is no one managing his Eliquis and DVT at this time. We will contact pt once this is confirmed.    Elevated PSA- recent PSA improved. Path benign and reviewed. Cont to follow.    RTO in 2 weeks for close follow up w Dr Molina. Advised to call sooner if needed.    CHEN Trejo - CNP  Dr. Fraga is supervising physician today and he approves plan of care.      Today's visit was to discuss gross hematuria, DVT, and elevated PSA. This is separate from TURP f/u.

## 2024-07-16 NOTE — TELEPHONE ENCOUNTER
Please call [facility]. Patient sees Dr Tamayo. On ASA 81 daily. Recently dx w DVT. Now on Eliquis. Started by  Micah . Patient having significant gross hematuria. Please see if patient can HOLD ASA while on Eliquis. Also, please see if he can arrange follow up for DVT at that office.    Thanks!    Nadia Tolbert, APRN - CNP

## 2024-07-17 ENCOUNTER — TELEPHONE (OUTPATIENT)
Age: 74
End: 2024-07-17

## 2024-07-17 NOTE — TELEPHONE ENCOUNTER
Seen in ER 7/11/24 for hematuria.Also recently diagnosed w/DVT post TURP and placed on Eliquis starter pack.[facility] is calling asking if pt.may stop [id number] QDAY and to fu here for DVT.    Note forwarded to  for advice.

## 2024-07-17 NOTE — TELEPHONE ENCOUNTER
Callled pt.kendram w/MD response.Will try reaching tomorrow.Also sent Earbitshart message can called Urology lvm.

## 2024-07-17 NOTE — TELEPHONE ENCOUNTER
Patient may stop aspirin from cardiovascular standpoint.  We do not manage DVT or anticoagulation for DVT.  Patient should seek management from his primary care physician.

## 2024-07-17 NOTE — TELEPHONE ENCOUNTER
Went to ER with blood in Urine and was put on Eliquis because of DVT  NP there Nadia Tolbert asks that we call him for an appointment and also Can he come off of ASA since bleeding taking both Please call

## 2024-07-18 NOTE — TELEPHONE ENCOUNTER
Please contact patient and let him know what cardiology said. See how the bleeding is going today.    Nadia Tolbert, APRN - CNP

## 2024-07-19 NOTE — TELEPHONE ENCOUNTER
I spoke w/Meme at [facility] bernadette ROSE response They have been trying to reach pt.w/MD response as well and has sent a mychart message.They have also sent a mychart message and will continue to try and reach pt.w/MD response to stop ASA.

## 2024-08-06 NOTE — PROGRESS NOTES
[facility]  [address]  [place] [address]  [phone]          Nicholas Mcdonald  : 1950    Chief Complaint   Patient presents with    Follow-up          HPI     Nicholas Mcdonald is a 73 y.o. male with a PMH of elevated PSA and negative prostate biopsy 4 years ago s/p TURP 2024 for BPH/LUTS who returns for follow up.     Today, he reports that LUTS are better after TURP.  Remains on flomax / finasteride.  Did have post-op DVT and is on eliquis for that.  Path negative.     PVR: 13 cc    IPSS: 8     Lab Results   Component Value Date    PSA 3.0 2024           Past Medical History:   Diagnosis Date    Arthritis     BPH (benign prostatic hyperplasia)     Cancer (HCC)     skin cancer basal cell    Chronic fatigue     Conversion disorder with seizures or convulsions     Elevated PSA     History of PFTs 2022    History of transesophageal echocardiography ([facility]URDO) 2022    EF of 50 - 55%; Moderate regurgitation mitral & tricuspid; severe aortic stenosis    Hypertension     Implantable loop recorder present     Low back pain     Major depressive disorder     Neck injury 2022    fractured C2; reports neck brace and no surgery    Seizures (HCC)     Sleep apnea     uses cpap hs    Umbilical hernia      Past Surgical History:   Procedure Laterality Date    ABDOMEN SURGERY      repaired chain saw laceration    CARDIAC PROCEDURE N/A 2022    Left heart cath / coronary angiography performed by Godwin Orta MD at [facility] CARDIAC CATH LAB    CARDIAC PROCEDURE N/A 2022    Transcatheter aortic valve replacement performed by Artur Vasquez MD at [facility] MAIN OR    CARDIAC VALVE REPLACEMENT      CYSTOSCOPY      EP DEVICE PROCEDURE N/A 2023    Loop recorder insert performed by David Wasserman MD at [facility] CARDIAC CATH LAB    HERNIA REPAIR      INVASIVE VASCULAR N/A 2022    Angiography lower ext bilat performed by Godwin TADEO

## 2024-08-07 ENCOUNTER — OFFICE VISIT (OUTPATIENT)
Dept: UROLOGY | Age: 74
End: 2024-08-07

## 2024-08-07 DIAGNOSIS — N40.1 BPH WITH OBSTRUCTION/LOWER URINARY TRACT SYMPTOMS: Primary | ICD-10-CM

## 2024-08-07 DIAGNOSIS — I82.461 ACUTE DEEP VEIN THROMBOSIS (DVT) OF CALF MUSCLE VEIN OF RIGHT LOWER EXTREMITY (HCC): ICD-10-CM

## 2024-08-07 DIAGNOSIS — N13.8 BPH WITH OBSTRUCTION/LOWER URINARY TRACT SYMPTOMS: Primary | ICD-10-CM

## 2024-08-07 LAB
BILIRUBIN, URINE, POC: NORMAL
BLOOD URINE, POC: NORMAL
GLUCOSE URINE, POC: NEGATIVE
KETONES, URINE, POC: 15
LEUKOCYTE ESTERASE, URINE, POC: NORMAL
NITRITE, URINE, POC: NEGATIVE
PH, URINE, POC: 6 (ref 4.6–8)
PROTEIN,URINE, POC: 300
PVR, POC: 13 CC
SPECIFIC GRAVITY, URINE, POC: 1.03 (ref 1–1.03)
URINALYSIS CLARITY, POC: NORMAL
URINALYSIS COLOR, POC: NORMAL
UROBILINOGEN, POC: NORMAL

## 2024-08-07 ASSESSMENT — ENCOUNTER SYMPTOMS: NAUSEA: 0

## 2024-08-20 PROCEDURE — 93298 REM INTERROG DEV EVAL SCRMS: CPT | Performed by: INTERNAL MEDICINE

## 2024-10-08 PROCEDURE — 93298 REM INTERROG DEV EVAL SCRMS: CPT | Performed by: INTERNAL MEDICINE

## 2024-11-04 ENCOUNTER — OFFICE VISIT (OUTPATIENT)
Dept: UROLOGY | Age: 74
End: 2024-11-04
Payer: MEDICARE

## 2024-11-04 DIAGNOSIS — N13.8 BPH WITH OBSTRUCTION/LOWER URINARY TRACT SYMPTOMS: Primary | ICD-10-CM

## 2024-11-04 DIAGNOSIS — N40.1 BPH WITH OBSTRUCTION/LOWER URINARY TRACT SYMPTOMS: Primary | ICD-10-CM

## 2024-11-04 LAB
BILIRUBIN, URINE, POC: NEGATIVE
BLOOD URINE, POC: NEGATIVE
GLUCOSE URINE, POC: NEGATIVE MG/DL
KETONES, URINE, POC: NEGATIVE MG/DL
LEUKOCYTE ESTERASE, URINE, POC: NEGATIVE
NITRITE, URINE, POC: NEGATIVE
PH, URINE, POC: 5.5 (ref 4.6–8)
PROTEIN,URINE, POC: NORMAL MG/DL
PVR, POC: 17 CC
SPECIFIC GRAVITY, URINE, POC: 1.02 (ref 1–1.03)
URINALYSIS CLARITY, POC: NORMAL
URINALYSIS COLOR, POC: NORMAL
UROBILINOGEN, POC: NORMAL MG/DL

## 2024-11-04 PROCEDURE — 81003 URINALYSIS AUTO W/O SCOPE: CPT | Performed by: UROLOGY

## 2024-11-04 PROCEDURE — 1123F ACP DISCUSS/DSCN MKR DOCD: CPT | Performed by: UROLOGY

## 2024-11-04 PROCEDURE — 51798 US URINE CAPACITY MEASURE: CPT | Performed by: UROLOGY

## 2024-11-04 PROCEDURE — 1159F MED LIST DOCD IN RCRD: CPT | Performed by: UROLOGY

## 2024-11-04 PROCEDURE — 99213 OFFICE O/P EST LOW 20 MIN: CPT | Performed by: UROLOGY

## 2024-11-04 RX ORDER — FINASTERIDE 5 MG/1
5 TABLET, FILM COATED ORAL DAILY
Qty: 90 TABLET | Refills: 3 | Status: SHIPPED | OUTPATIENT
Start: 2024-11-04

## 2024-11-04 RX ORDER — TAMSULOSIN HYDROCHLORIDE 0.4 MG/1
0.4 CAPSULE ORAL 2 TIMES DAILY
Qty: 180 CAPSULE | Refills: 3 | Status: SHIPPED | OUTPATIENT
Start: 2024-11-04

## 2024-11-06 ASSESSMENT — ENCOUNTER SYMPTOMS
ABDOMINAL PAIN: 0
NAUSEA: 0
INDIGESTION: 0
HEARTBURN: 0
CONSTIPATION: 0
BACK PAIN: 0
SHORTNESS OF BREATH: 0
DIARRHEA: 0
BLOOD IN STOOL: 0
VOMITING: 0
COUGH: 0
WHEEZING: 0
EYE PAIN: 0
SKIN LESIONS: 0
EYE DISCHARGE: 0

## 2024-11-07 NOTE — PROGRESS NOTES
[facility]  [address]   [address]  [place] [address]  [phone]    Nicholas Mcdonald  : 1950    Chief Complaint   Patient presents with    Follow-up          HPI     Nicholas Mcdonald is a 73 y.o. male    History of Present Illness  The patient presents for follow-up on prostate symptoms after TURP surgery 2024.  Pathology negative.     He reports that his condition remains unchanged post-surgery, with no significant discomfort. He is currently on Flomax and finasteride, which he believes are necessary to maintain his urinary function. He had previously discontinued these medications, but noticed a significant decrease in his urinary flow and an increase in prostate size, prompting him to resume the medication.  Does fine as long as he is on the medication.     PVR: 17 cc    IPSS: 8     Past Medical History:   Diagnosis Date    Arthritis     BPH (benign prostatic hyperplasia)     Cancer (HCC)     skin cancer basal cell    Chronic fatigue     Conversion disorder with seizures or convulsions     Elevated PSA     History of PFTs 2022    History of transesophageal echocardiography (ZURDO) 2022    EF of 50 - 55%; Moderate regurgitation mitral & tricuspid; severe aortic stenosis    Hypertension     Implantable loop recorder present     Low back pain     Major depressive disorder     Neck injury 2022    fractured C2; reports neck brace and no surgery    Seizures (HCC)     Sleep apnea     uses cpap hs    Umbilical hernia        Past Surgical History:   Procedure Laterality Date    ABDOMEN SURGERY      repaired chain saw laceration    CARDIAC PROCEDURE N/A 2022    Left heart cath / coronary angiography performed by Godwin Orta MD at [facility] CARDIAC CATH LAB    CARDIAC PROCEDURE N/A 2022    Transcatheter aortic valve replacement performed by Artur Vasquez MD at [facility] MAIN OR    CARDIAC VALVE REPLACEMENT      CYSTOSCOPY      EP DEVICE

## 2024-11-08 PROCEDURE — 93298 REM INTERROG DEV EVAL SCRMS: CPT | Performed by: INTERNAL MEDICINE

## 2024-11-25 ENCOUNTER — TELEPHONE (OUTPATIENT)
Age: 74
End: 2024-11-25

## 2024-11-25 NOTE — TELEPHONE ENCOUNTER
Pt came by [facility] office due to they did have an appt,but was told it was rescheduled would like to get the results of his echo

## 2024-12-04 NOTE — TELEPHONE ENCOUNTER
Echocardiogram looks good.  He has normal left ventricular systolic function and normal TAVR function.

## 2025-01-24 ENCOUNTER — OFFICE VISIT (OUTPATIENT)
Age: 75
End: 2025-01-24

## 2025-01-24 VITALS
DIASTOLIC BLOOD PRESSURE: 68 MMHG | HEIGHT: 66 IN | SYSTOLIC BLOOD PRESSURE: 134 MMHG | BODY MASS INDEX: 30.53 KG/M2 | HEART RATE: 68 BPM | WEIGHT: 190 LBS

## 2025-01-24 DIAGNOSIS — R53.82 CHRONIC FATIGUE: Chronic | ICD-10-CM

## 2025-01-24 DIAGNOSIS — I10 PRIMARY HYPERTENSION: Chronic | ICD-10-CM

## 2025-01-24 DIAGNOSIS — Z95.2 S/P TAVR (TRANSCATHETER AORTIC VALVE REPLACEMENT): Primary | ICD-10-CM

## 2025-01-24 DIAGNOSIS — I44.7 LBBB (LEFT BUNDLE BRANCH BLOCK): Chronic | ICD-10-CM

## 2025-01-24 RX ORDER — PREDNISOLONE ACETATE 10 MG/ML
SUSPENSION/ DROPS OPHTHALMIC
COMMUNITY
Start: 2025-01-09

## 2025-01-24 ASSESSMENT — ENCOUNTER SYMPTOMS
SHORTNESS OF BREATH: 1
ABDOMINAL PAIN: 0
BACK PAIN: 0
COUGH: 0

## 2025-01-24 NOTE — PROGRESS NOTES
No       Review Of Symptoms    Review of Systems   Constitutional: Negative for fever and malaise/fatigue.   HENT:  Negative for nosebleeds.    Cardiovascular:  Negative for chest pain, dyspnea on exertion and palpitations.   Respiratory:  Positive for shortness of breath. Negative for cough.    Musculoskeletal:  Negative for back pain, muscle cramps, muscle weakness and myalgias.   Gastrointestinal:  Negative for abdominal pain.   Neurological:  Negative for dizziness.        Physical Exam  Blood pressure 134/68, pulse 68, height 1.676 m (5' 6\"), weight 86.2 kg (190 lb).  Physical Exam  HENT:      Head: Normocephalic and atraumatic.   Eyes:      Extraocular Movements: Extraocular movements intact.   Cardiovascular:      Rate and Rhythm: Normal rate and regular rhythm.      Heart sounds: No murmur heard.  Pulmonary:      Effort: Pulmonary effort is normal.      Breath sounds: Normal breath sounds.   Abdominal:      Palpations: Abdomen is soft.   Musculoskeletal:         General: Normal range of motion.   Skin:     General: Skin is warm and dry.   Neurological:      General: No focal deficit present.      Mental Status: He is oriented to person, place, and time.           Medical problems, medical history and test results were reviewed with the patient today.      Lab Results   Component Value Date    CHOL 171 09/21/2023     Lab Results   Component Value Date    TRIG 23 (L) 09/21/2023     Lab Results   Component Value Date    HDL 97 (H) 09/21/2023     No components found for: \"LDLCHOLESTEROL\", \"LDLCALC\"    Lab Results   Component Value Date    VLDL 4.6 (L) 09/21/2023     Lab Results   Component Value Date    CHOLHDLRATIO 1.8 09/21/2023        Lab Results   Component Value Date    LABA1C 5.7 (H) 09/21/2023     Lab Results   Component Value Date     09/21/2023        Lab Results   Component Value Date     07/11/2024    K 4.6 07/11/2024     07/11/2024    CO2 30 (H) 07/11/2024    BUN 11 07/11/2024

## 2025-02-16 ENCOUNTER — HOSPITAL ENCOUNTER (EMERGENCY)
Age: 75
Discharge: HOME | End: 2025-02-16
Attending: STUDENT IN AN ORGANIZED HEALTH CARE EDUCATION/TRAINING PROGRAM | Admitting: INTERNAL MEDICINE
Payer: OTHER GOVERNMENT

## 2025-02-16 VITALS
SYSTOLIC BLOOD PRESSURE: 149 MMHG | TEMPERATURE: 98.8 F | OXYGEN SATURATION: 96 % | RESPIRATION RATE: 18 BRPM | HEART RATE: 97 BPM | DIASTOLIC BLOOD PRESSURE: 75 MMHG

## 2025-02-16 DIAGNOSIS — J11.1 INFLUENZA WITH RESPIRATORY MANIFESTATION OTHER THAN PNEUMONIA: Primary | ICD-10-CM

## 2025-02-16 LAB
FLUAV RNA SPEC QL NAA+PROBE: DETECTED
FLUBV RNA SPEC QL NAA+PROBE: NOT DETECTED
SARS-COV-2 RDRP RESP QL NAA+PROBE: NOT DETECTED
SOURCE: NORMAL

## 2025-02-16 PROCEDURE — 87635 SARS-COV-2 COVID-19 AMP PRB: CPT

## 2025-02-16 PROCEDURE — 87502 INFLUENZA DNA AMP PROBE: CPT

## 2025-02-16 PROCEDURE — 99283 EMERGENCY DEPT VISIT LOW MDM: CPT

## 2025-02-16 RX ORDER — OSELTAMIVIR PHOSPHATE 75 MG/1
75 CAPSULE ORAL 2 TIMES DAILY
Qty: 20 CAPSULE | Refills: 0 | Status: SHIPPED | OUTPATIENT
Start: 2025-02-16 | End: 2025-02-26

## 2025-02-17 NOTE — DISCHARGE INSTRUCTIONS
Take Tamiflu as prescribed.  Continue taking over-the-counter cough and cold medication for symptom management.  Follow-up with primary care physician within 1 week.  Return to the ER for worsening or worrisome symptoms.

## 2025-02-17 NOTE — ED PROVIDER NOTES
Emergency Department Provider Note       PCP: Carmel Chen MD   Age: 74 y.o.   Sex: male     DISPOSITION Decision To Discharge 02/16/2025 08:44:29 PM    ICD-10-CM    1. Influenza with respiratory manifestation other than pneumonia  J11.1           Medical Decision Making     74-year-old male presents emerged part with URI symptoms with bodyaches and cough that began yesterday.  Wife and son have been sick with similar symptoms.  Did not receive his influenza vaccine this year.  Denies nausea or vomiting.  COVID and flu swabs obtained, influenza is positive.  Requesting Tamiflu which will be prescribed.  Also encouraged over-the-counter medication for symptom management.  Outpatient follow-up and return precautions given.  Patient and family voiced understanding agreement.     1 acute, uncomplicated illness or injury.  Prescription drug management performed.  Shared medical decision making was utilized in creating the patients health plan today.  I independently ordered and reviewed each unique test.    I reviewed external records: provider visit note from outside specialist.   The patients assessment required an independent historian: wife.  The reason they were needed is important historical information not provided by the patient.                  History     74-year-old male presents emerged part with URI symptoms with bodyaches and cough that began yesterday.  Wife and son have been sick with similar symptoms.  Did not receive his influenza vaccine this year.  Denies nausea or vomiting.         ROS     Review of Systems     Physical Exam     Vitals signs and nursing note reviewed:  Vitals:    02/16/25 1830   BP: (!) 149/75   Pulse: 97   Resp: 18   Temp: 98.8 °F (37.1 °C)   SpO2: 96%      Physical Exam  Vitals and nursing note reviewed.   Constitutional:       General: He is not in acute distress.     Appearance: Normal appearance.   HENT:      Head: Normocephalic.      Nose: Nose normal.

## 2025-02-24 ENCOUNTER — HOSPITAL ENCOUNTER (EMERGENCY)
Age: 75
Discharge: HOME | End: 2025-02-24
Payer: OTHER GOVERNMENT

## 2025-02-24 ENCOUNTER — APPOINTMENT (OUTPATIENT)
Dept: GENERAL RADIOLOGY | Age: 75
End: 2025-02-24
Payer: OTHER GOVERNMENT

## 2025-02-24 VITALS
DIASTOLIC BLOOD PRESSURE: 92 MMHG | SYSTOLIC BLOOD PRESSURE: 138 MMHG | HEIGHT: 67 IN | RESPIRATION RATE: 16 BRPM | OXYGEN SATURATION: 97 % | BODY MASS INDEX: 22.76 KG/M2 | TEMPERATURE: 98.1 F | HEART RATE: 83 BPM | WEIGHT: 145 LBS

## 2025-02-24 DIAGNOSIS — R07.9 CHEST PAIN, UNSPECIFIED TYPE: Primary | ICD-10-CM

## 2025-02-24 DIAGNOSIS — K92.1 BLOOD IN STOOL: ICD-10-CM

## 2025-02-24 LAB
ABO + RH BLD: NORMAL
ALBUMIN SERPL-MCNC: 3.7 G/DL (ref 3.2–4.6)
ALBUMIN/GLOB SERPL: 1.2 (ref 1–1.9)
ALP SERPL-CCNC: 119 U/L (ref 40–129)
ALT SERPL-CCNC: 21 U/L (ref 8–55)
ANION GAP SERPL CALC-SCNC: 11 MMOL/L (ref 7–16)
AST SERPL-CCNC: 19 U/L (ref 15–37)
BASOPHILS # BLD: 0.03 K/UL (ref 0–0.2)
BASOPHILS NFR BLD: 0.5 % (ref 0–2)
BILIRUB SERPL-MCNC: 0.4 MG/DL (ref 0–1.2)
BLOOD GROUP ANTIBODIES SERPL: NORMAL
BUN SERPL-MCNC: 15 MG/DL (ref 8–23)
CALCIUM SERPL-MCNC: 9.2 MG/DL (ref 8.8–10.2)
CHLORIDE SERPL-SCNC: 104 MMOL/L (ref 98–107)
CO2 SERPL-SCNC: 27 MMOL/L (ref 20–29)
CREAT SERPL-MCNC: 0.98 MG/DL (ref 0.8–1.3)
DIFFERENTIAL METHOD BLD: NORMAL
EOSINOPHIL # BLD: 0.07 K/UL (ref 0–0.8)
EOSINOPHIL NFR BLD: 1.2 % (ref 0.5–7.8)
ERYTHROCYTE [DISTWIDTH] IN BLOOD BY AUTOMATED COUNT: 13 % (ref 11.9–14.6)
GLOBULIN SER CALC-MCNC: 3 G/DL (ref 2.3–3.5)
GLUCOSE SERPL-MCNC: 96 MG/DL (ref 70–99)
HCT VFR BLD AUTO: 41.9 % (ref 41.1–50.3)
HGB BLD-MCNC: 14.2 G/DL (ref 13.6–17.2)
IMM GRANULOCYTES # BLD AUTO: 0.02 K/UL (ref 0–0.5)
IMM GRANULOCYTES NFR BLD AUTO: 0.3 % (ref 0–5)
LYMPHOCYTES # BLD: 1.54 K/UL (ref 0.5–4.6)
LYMPHOCYTES NFR BLD: 26.8 % (ref 13–44)
MCH RBC QN AUTO: 31.5 PG (ref 26.1–32.9)
MCHC RBC AUTO-ENTMCNC: 33.9 G/DL (ref 31.4–35)
MCV RBC AUTO: 92.9 FL (ref 82–102)
MONOCYTES # BLD: 0.59 K/UL (ref 0.1–1.3)
MONOCYTES NFR BLD: 10.3 % (ref 4–12)
NEUTS SEG # BLD: 3.5 K/UL (ref 1.7–8.2)
NEUTS SEG NFR BLD: 60.9 % (ref 43–78)
NRBC # BLD: 0 K/UL (ref 0–0.2)
PLATELET # BLD AUTO: 247 K/UL (ref 150–450)
PMV BLD AUTO: 9.4 FL (ref 9.4–12.3)
POTASSIUM SERPL-SCNC: 4.6 MMOL/L (ref 3.5–5.1)
PROT SERPL-MCNC: 6.7 G/DL (ref 6.3–8.2)
RBC # BLD AUTO: 4.51 M/UL (ref 4.23–5.6)
SODIUM SERPL-SCNC: 142 MMOL/L (ref 136–145)
SPECIMEN EXP DATE BLD: NORMAL
TROPONIN T SERPL HS-MCNC: 65 NG/L (ref 0–22)
TROPONIN T SERPL HS-MCNC: 67 NG/L (ref 0–22)
WBC # BLD AUTO: 5.8 K/UL (ref 4.3–11.1)

## 2025-02-24 PROCEDURE — 86850 RBC ANTIBODY SCREEN: CPT

## 2025-02-24 PROCEDURE — 71046 X-RAY EXAM CHEST 2 VIEWS: CPT

## 2025-02-24 PROCEDURE — 80053 COMPREHEN METABOLIC PANEL: CPT

## 2025-02-24 PROCEDURE — 85025 COMPLETE CBC W/AUTO DIFF WBC: CPT

## 2025-02-24 PROCEDURE — 84484 ASSAY OF TROPONIN QUANT: CPT

## 2025-02-24 PROCEDURE — 99285 EMERGENCY DEPT VISIT HI MDM: CPT

## 2025-02-24 PROCEDURE — 86900 BLOOD TYPING SEROLOGIC ABO: CPT

## 2025-02-24 PROCEDURE — 86901 BLOOD TYPING SEROLOGIC RH(D): CPT

## 2025-02-24 ASSESSMENT — ENCOUNTER SYMPTOMS
BLOOD IN STOOL: 1
RESPIRATORY NEGATIVE: 1

## 2025-02-24 ASSESSMENT — PAIN SCALES - GENERAL
PAINLEVEL_OUTOF10: 0
PAINLEVEL_OUTOF10: 0

## 2025-02-24 ASSESSMENT — PAIN - FUNCTIONAL ASSESSMENT: PAIN_FUNCTIONAL_ASSESSMENT: 0-10

## 2025-02-24 NOTE — ED PROVIDER NOTES
On his anus he does have what appears to be small varicose veins.  None actively bleeding.  On rectal exam there is no stool in the vault, but when smeared on the card there is trace positive blood.  Musculoskeletal:         General: No swelling or tenderness. Normal range of motion.      Cervical back: Normal range of motion and neck supple.   Skin:     General: Skin is warm and dry.      Findings: No rash.   Neurological:      General: No focal deficit present.      Mental Status: He is alert and oriented to person, place, and time. Mental status is at baseline.   Psychiatric:         Mood and Affect: Mood normal.         Behavior: Behavior normal.         Thought Content: Thought content normal.        Procedures     Procedures    Orders placed during this emergency department visit:     Orders Placed This Encounter   Procedures    XR CHEST (2 VW)    CBC with Auto Differential    Troponin    Comprehensive Metabolic Panel    [facility], [facility] [place]    Cardiac Monitor - ED Only    EKG 12 Lead    TYPE AND SCREEN    Saline lock IV        Medications given during this emergency department visit:   Medications - No data to display    New prescriptions:     New Prescriptions    No medications on file        Past History and Complexity:     Past Medical History:   Diagnosis Date    Arthritis     BPH (benign prostatic hyperplasia)     Cancer (HCC)     skin cancer basal cell    Chronic fatigue     Conversion disorder with seizures or convulsions     Elevated PSA     History of PFTs 09/06/2022    History of transesophageal echocardiography ([facility]DO) 09/02/2022    EF of 50 - 55%; Moderate regurgitation mitral & tricuspid; severe aortic stenosis    Hypertension     Implantable loop recorder present     Low back pain     Major depressive disorder     Neck injury 01/25/2022    fractured C2; reports neck brace and no surgery    Seizures (HCC) 1995    Sleep

## 2025-02-24 NOTE — ED TRIAGE NOTES
Patient arrives to ED pov from home. Patient reports chest pain and rectal bleeding for 3 days. Denies blood thinners. Denies SOB. Patient recently had the flu. Patient reports cardiac history.

## 2025-02-24 NOTE — ED NOTES
I have reviewed discharge instructions with the patient.  The patient verbalized understanding.    Patient left ED via Discharge Method: ambulatory to Home with self.  Opportunity for questions and clarification provided.       Patient given 0 scripts.         To continue your aftercare when you leave the hospital, you may receive an automated call from our care team to check in on how you are doing.  This is a free service and part of our promise to provide the best care and service to meet your aftercare needs.” If you have questions, or wish to unsubscribe from this service please call [phone].  Thank you for Choosing our [facility] Emergency Department.         Carol Pedro, RN  02/24/25 [id number]

## 2025-02-24 NOTE — DISCHARGE INSTRUCTIONS
Call cardiology and gastroenterology for close follow-up.  If you to redevelop chest pain or blood in stool you need to return to the emergency department immediately.

## 2025-02-26 ENCOUNTER — PATIENT MESSAGE (OUTPATIENT)
Age: 75
End: 2025-02-26

## 2025-02-26 NOTE — TELEPHONE ENCOUNTER
Patient Active Problem List     Diagnosis Date Noted    Dark stools 02/21/2023       Priority: Medium    S/P TAVR (transcatheter aortic valve replacement) 10/03/2022       Priority: Medium       09/2022:  29mm Graff earle ultra valve       Chronic dyspnea 08/26/2022       Priority: Medium    Hypertension 08/26/2022       Priority: Medium    BPH with obstruction/lower urinary tract symptoms 07/02/2024       Priority: Low    Enlarged prostate with lower urinary tract symptoms (LUTS) 05/15/2024       Priority: Low    Acute metabolic encephalopathy 09/22/2023       Priority: Low    Transient global amnesia 09/22/2023       Priority: Low    Altered mental status, unspecified 09/21/2023       Priority: Low    Syncope 08/14/2023       Priority: Low    Episode of unresponsiveness 08/12/2023       Priority: Low    LBBB (left bundle branch block) 08/12/2023       Priority: Low    Seizure disorder (HCC) 08/12/2023       Priority: Low    Arthritis of right knee 05/09/2023       Priority: Low    Osteoarthritis of right knee 03/09/2023       Priority: Low       Added automatically from request for surgery [id number]       Left lower quadrant abdominal pain 10/07/2021       Priority: Low    Wound, open, abdominal wall, anterior 12/14/2011       Priority: Low    Chronic fatigue 05/17/2024

## 2025-02-26 NOTE — TELEPHONE ENCOUNTER
I am willing to comment on any cardiovascular conditions he has.  But would not be able to comment beyond that.

## 2025-04-24 PROCEDURE — 93298 REM INTERROG DEV EVAL SCRMS: CPT | Performed by: INTERNAL MEDICINE

## 2025-06-06 PROCEDURE — 93298 REM INTERROG DEV EVAL SCRMS: CPT | Performed by: INTERNAL MEDICINE

## 2025-07-16 PROCEDURE — 93298 REM INTERROG DEV EVAL SCRMS: CPT | Performed by: INTERNAL MEDICINE

## 2025-07-24 ENCOUNTER — OFFICE VISIT (OUTPATIENT)
Age: 75
End: 2025-07-24
Payer: MEDICARE

## 2025-07-24 VITALS
HEIGHT: 67 IN | WEIGHT: 187 LBS | HEART RATE: 64 BPM | BODY MASS INDEX: 29.35 KG/M2 | SYSTOLIC BLOOD PRESSURE: 144 MMHG | DIASTOLIC BLOOD PRESSURE: 60 MMHG

## 2025-07-24 DIAGNOSIS — Z86.718 HISTORY OF DVT (DEEP VEIN THROMBOSIS): Chronic | ICD-10-CM

## 2025-07-24 DIAGNOSIS — R53.82 CHRONIC FATIGUE: Chronic | ICD-10-CM

## 2025-07-24 DIAGNOSIS — I10 PRIMARY HYPERTENSION: Chronic | ICD-10-CM

## 2025-07-24 DIAGNOSIS — Z95.2 S/P TAVR (TRANSCATHETER AORTIC VALVE REPLACEMENT): Primary | ICD-10-CM

## 2025-07-24 PROCEDURE — 99214 OFFICE O/P EST MOD 30 MIN: CPT | Performed by: INTERNAL MEDICINE

## 2025-07-24 PROCEDURE — 1123F ACP DISCUSS/DSCN MKR DOCD: CPT | Performed by: INTERNAL MEDICINE

## 2025-07-24 PROCEDURE — 3078F DIAST BP <80 MM HG: CPT | Performed by: INTERNAL MEDICINE

## 2025-07-24 PROCEDURE — 3077F SYST BP >= 140 MM HG: CPT | Performed by: INTERNAL MEDICINE

## 2025-07-24 PROCEDURE — 1126F AMNT PAIN NOTED NONE PRSNT: CPT | Performed by: INTERNAL MEDICINE

## 2025-07-24 RX ORDER — MOXIFLOXACIN 5 MG/ML
SOLUTION/ DROPS OPHTHALMIC
COMMUNITY
Start: 2025-07-07

## 2025-07-24 ASSESSMENT — ENCOUNTER SYMPTOMS
SHORTNESS OF BREATH: 1
ABDOMINAL PAIN: 0
COUGH: 0
BACK PAIN: 0

## 2025-07-24 NOTE — PROGRESS NOTES
[facility]  [address] [place] [address], [address]  [place] [address]      25      NAME:  Nicholas Mcdonald  : 1950  MRN: [id number]      SUBJECTIVE:   Nicholas Mcdonald is a 74 y.o. male seen for a follow up visit regarding the following:     Chief Complaint   Patient presents with    S/P TAVR (transcatheter aortic valve replacement)       HPI:   74 y.o. male with history of critical aortic stenosis.  He had type I bicuspid valve.  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  Echo 2024 with normal LVEF and TAVR function.   No arrhythmias on ILR.  He had a DVT that was diagnosed 2024.  He did not take any OAC.  Never reassessed.          Past Medical History, Past Surgical History, Family history, Social History, and Medications were all reviewed with the patient today and updated as necessary.     Current Outpatient Medications   Medication Sig Dispense Refill    moxifloxacin (VIGAMOX) 0.5 % ophthalmic solution INSTILL 1 DROP INTO THE SURGERY EYE ONCE EVERY HR WHILE AWAKE STARTING AT 5PM 1 DAY PRIOR TO SURGERY      prednisoLONE acetate (PRED FORTE) 1 % ophthalmic suspension Apply to eye      tamsulosin (FLOMAX) 0.4 MG capsule Take 1 capsule by mouth in the morning and at bedtime Indications: evening dose at 6 pm 180 capsule 3    finasteride (PROSCAR) 5 MG tablet Take 1 tablet by mouth daily 90 tablet 3    apixaban (ELIQUIS) 5 MG TABS tablet Take 1 tablet by mouth 2 times daily 60 tablet 0    apixaban starter pack (ELIQUIS DVT/PE STARTER PACK) 5 MG TBPK tablet Take 1 tablet by mouth See Admin Instructions 74 tablet 0    Multiple Vitamins-Minerals (THERAPEUTIC MULTIVITAMIN-MINERALS) tablet Take 1 tablet by mouth daily      folic acid (FOLVITE) 1 MG tablet Take 1 tablet by mouth daily      Cholecalciferol (VITAMIN D3) 1.25 MG (68449 UT) CAPS Take by mouth      aspirin EC 81 MG EC tablet Take 1 tablet by mouth daily Daily in morning per patient 30 tablet 1

## 2025-07-28 ENCOUNTER — TELEPHONE (OUTPATIENT)
Age: 75
End: 2025-07-28

## 2025-07-28 NOTE — TELEPHONE ENCOUNTER
Called patient and informed him that we received paperwork from [facility] regarding disability.    Per Dr. MIGUEL, we can not fill out paperwork, we will only be able to fax records to the [facility].    Patient was instructed to come to office to fill out a records release giving us permission to fax records to the [facility].    Patient verbalized understanding

## 2025-08-08 ENCOUNTER — TELEPHONE (OUTPATIENT)
Age: 75
End: 2025-08-08

## 2025-08-16 PROCEDURE — 93298 REM INTERROG DEV EVAL SCRMS: CPT | Performed by: INTERNAL MEDICINE

## (undated) DEVICE — SOLUTION IRRIG 1000ML 0.9% SOD CHL USP POUR PLAS BTL

## (undated) DEVICE — DRAPE EQUIP SATELLITE STN STRL VELYS

## (undated) DEVICE — CATHETER DIAG AD 6FR L100CM 0.038IN COR POLYUR INT MAMM

## (undated) DEVICE — CUTTING LOOP, BIPOLAR, 24/26 FR.: Brand: N.A.

## (undated) DEVICE — SUTURE VCRL SZ 2-0 L27IN ABSRB UD L36MM CP-1 1/2 CIR REV J266H

## (undated) DEVICE — SYRINGE, LUER LOCK, 60ML: Brand: MEDLINE

## (undated) DEVICE — ELECTRODE DEFIB CARD W/ LVP GEL MULTFUNC PRO-PADZ

## (undated) DEVICE — GUIDEWIRE WITH ICE™ HYDROPHILIC COATING: Brand: CHOICE™

## (undated) DEVICE — STERILE PRESSURE PROTECTOR PAD® FOR DE MAYO UNIVERSAL DISTRACTOR® (10/CASE): Brand: DE MAYO UNIVERSAL DISTRACTOR®

## (undated) DEVICE — SOLUTION IRRIG 1000ML 09% SOD CHL USP PIC PLAS CONTAINER

## (undated) DEVICE — PIN DRL 4X125 MM ROBOTIC-ASSISTED SOLUTION ARRY VELYS

## (undated) DEVICE — CATHETER VASC 6 FR DIAG PGTL W/ SIDE H COR 110 CM LEN W/OUT

## (undated) DEVICE — BAND COMPR L24CM REG CLR PLAS HEMSTAT EXT HK AND LOOP RETEN

## (undated) DEVICE — SYRINGE,TOOMEY,IRRIGATION,70CC,STERILE: Brand: MEDLINE

## (undated) DEVICE — YANKAUER,FLEXIBLE HANDLE,REGLR CAPACITY: Brand: MEDLINE INDUSTRIES, INC.

## (undated) DEVICE — LIQUIBAND RAPID ADHESIVE 36/CS 0.8ML: Brand: MEDLINE

## (undated) DEVICE — SYRINGE MED 50ML LUERLOCK TIP

## (undated) DEVICE — CATHETER DIAG AD 5FR L125CM COR NYL MP AMPLATZ 2 W/ 2 SIDE

## (undated) DEVICE — RADIFOCUS OPTITORQUE ANGIOGRAPHIC CATHETER: Brand: OPTITORQUE

## (undated) DEVICE — PINNACLE INTRODUCER SHEATH: Brand: PINNACLE

## (undated) DEVICE — GUIDE NDL ST W/ 14 X 147CM TELESCOPICALLY FLD CIV FLX CVR

## (undated) DEVICE — GUIDEWIRE VASC L260CM DIA0.035IN TAPR L11CM FLPY TIP L4CM

## (undated) DEVICE — STERILE PVP: Brand: MEDLINE INDUSTRIES, INC.

## (undated) DEVICE — SUTURE NONABSORBABLE MONOFILAMENT 5-0 C-1 1X24 IN PROLENE 8725H

## (undated) DEVICE — SOLUTION IRRIG 1000ML STRL H2O USP PLAS POUR BTL

## (undated) DEVICE — BIPOLAR SEALER 23-112-1 AQM 6.0: Brand: AQUAMANTYS ®

## (undated) DEVICE — CLOSURE SKIN FACILITATES COMPATIBILITY W/ CERTAIN IS DSG

## (undated) DEVICE — SUTURE VCRL + SZ 4-0 L27IN ABSRB UD PS-2 3/8 CIR REV CUT VCP426H

## (undated) DEVICE — CATHETER DIAG AD 5FR L110CM 145DEG COR GRY HYDRPHLC NYL

## (undated) DEVICE — Device

## (undated) DEVICE — STOPCOCK ANGIO 1050PSI DK BLU L ROT M LUER 3 W OFF HNDL

## (undated) DEVICE — 3M™ TEGADERM™ TRANSPARENT FILM DRESSING FRAME STYLE, 1626W, 4 IN X 4-3/4 IN (10 CM X 12 CM), 50/CT 4CT/CASE: Brand: 3M™ TEGADERM™

## (undated) DEVICE — DRSG POSTOP PRMSL AG 3.5X4IN

## (undated) DEVICE — DRAPE,TOP,102X53,STERILE: Brand: MEDLINE

## (undated) DEVICE — DRESSING HYDROFIBER AQUACEL AG ADVANTAGE 3.5X14 IN

## (undated) DEVICE — SOLUTION IRRIG 3000ML 0.9% SOD CHL USP UROMATIC PLAS CONT

## (undated) DEVICE — 12 FOOT DISPOSABLE EXTENSION CABLE WITH SAFE CONNECT / SCREW-DOWN

## (undated) DEVICE — BAG,DRAINAGE,4L,A/R TOWER,LL,SLIDE TAP: Brand: MEDLINE

## (undated) DEVICE — GUIDEWIRE 035IN 210CM PTFE COAT FIX COR J TIP 15MM FIRM BODY

## (undated) DEVICE — DUAL CUT SAGITTAL BLADE

## (undated) DEVICE — SUTURE STRATAFIX SPRL SZ 1 L14IN ABSRB VLT L48CM CTX 1/2 SXPD2B405

## (undated) DEVICE — CATHETER ANGIO INFIN 038IN AMPLATZ 534545T

## (undated) DEVICE — COVER,TABLE,HEAVY DUTY,79"X110",STRL: Brand: MEDLINE

## (undated) DEVICE — DRAPE EQUIP ROBOT STRL VELYS 20/PK ORDER IN MULTIIPLES OF 20 EACH

## (undated) DEVICE — LARGE (BLUE) FOR GRAFTS UP TO 10 MM, 20 1/2" / 52 CM (1/PKG): Brand: SCANLAN® VASCULAR TUNNELER SHEATHS AND BULLET TIPS

## (undated) DEVICE — PIN BNE FIX TEMP L110MM DIA4MM MAKO

## (undated) DEVICE — DEVICE STBL AD TRICOT ANCHR PD FOR 3 W F CATH STATLOK

## (undated) DEVICE — PINNACLE TIF INTRODUCER SHEATH: Brand: PINNACLE

## (undated) DEVICE — TUBING, SUCTION, 1/4" X 10', STRAIGHT: Brand: MEDLINE

## (undated) DEVICE — MICROPUNCTURE INTRODUCER SET SILHOUETTE TRANSITIONLESS WITH NITINOL WIRE GUIDE: Brand: MICROPUNCTURE

## (undated) DEVICE — TURP TURB: Brand: MEDLINE INDUSTRIES, INC.

## (undated) DEVICE — GUIDEWIRE VASC L260CM DIA0.035IN BRECKER FOR COREVLV

## (undated) DEVICE — CLOSURE SKIN FLX NONINVASIVE PRELOC TECHNOLOGY FOR 24IN

## (undated) DEVICE — NO DESCRIPTION: Brand: SENTINEL

## (undated) DEVICE — SUTURE PERMAHAND SZ 0 L30IN NONABSORBABLE BLK L30MM PSL 3/8 590H

## (undated) DEVICE — SOLUTION IV 250ML 0.9% SOD CHL PH 5 INJ USP VIAFLX PLAS

## (undated) DEVICE — DRAPE SURG SPEC PROC 4 PC

## (undated) DEVICE — FAN SPRAY KIT: Brand: PULSAVAC®

## (undated) DEVICE — SYRINGE MED 30ML STD CLR PLAS LUERLOCK TIP N CTRL DISP

## (undated) DEVICE — PERCLOSE™ PROSTYLE™ SUTURE-MEDIATED CLOSURE AND REPAIR SYSTEM: Brand: PERCLOSE™ PROSTYLE™

## (undated) DEVICE — GUIDEWIRE VASC L150CM DIA0.035IN FLX TIP L7CM PTFE STR FIX

## (undated) DEVICE — KIT TRK KNEE PROC VIZADISC

## (undated) DEVICE — CATHETER URETH 24FR BLLN 5CC LTX 3 W F SPEC SHT RND TIP TWO

## (undated) DEVICE — PIN BNE FIX TEMP L140MM DIA4MM MAKO

## (undated) DEVICE — CATHETER URETH 24FR BLLN 30CC STD LTX 3 W TWO OPP DRNGE EYE

## (undated) DEVICE — GUIDEWIRE .035X260 3MMJ TCFC: Brand: MEDLINE INDUSTRIES, INC.

## (undated) DEVICE — CABG DR WILLIAMS: Brand: MEDLINE INDUSTRIES, INC.

## (undated) DEVICE — TOTAL KNEE DR RIDGEWAY: Brand: MEDLINE INDUSTRIES, INC.

## (undated) DEVICE — GLIDESHEATH SLENDER STAINLESS STEEL KIT: Brand: GLIDESHEATH SLENDER

## (undated) DEVICE — KIT DRP FOR RIO ROBOTIC ARM ASST SYS

## (undated) DEVICE — SUTURE VCRL SZ 1 L27IN ABSRB UD L36MM CP-1 1/2 CIR REV CUT J268H

## (undated) DEVICE — INTRODUCER SHTH 6FR L11CM 0.038IN STD SIDEPRT EXTN 3 W